# Patient Record
Sex: FEMALE | Race: BLACK OR AFRICAN AMERICAN | NOT HISPANIC OR LATINO | Employment: PART TIME | URBAN - METROPOLITAN AREA
[De-identification: names, ages, dates, MRNs, and addresses within clinical notes are randomized per-mention and may not be internally consistent; named-entity substitution may affect disease eponyms.]

---
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
## 2024-02-29 ENCOUNTER — OFFICE VISIT (OUTPATIENT)
Dept: OBSTETRICS AND GYNECOLOGY | Facility: CLINIC | Age: 19
End: 2024-02-29

## 2024-02-29 VITALS
DIASTOLIC BLOOD PRESSURE: 79 MMHG | SYSTOLIC BLOOD PRESSURE: 120 MMHG | WEIGHT: 137 LBS | BODY MASS INDEX: 22.82 KG/M2 | HEIGHT: 65 IN

## 2024-02-29 DIAGNOSIS — Z30.09 BIRTH CONTROL COUNSELING: ICD-10-CM

## 2024-02-29 DIAGNOSIS — R10.30 LOWER ABDOMINAL PAIN: ICD-10-CM

## 2024-02-29 DIAGNOSIS — R10.2 PELVIC PAIN: Primary | ICD-10-CM

## 2024-02-29 RX ORDER — NORELGESTROMIN AND ETHINYL ESTRADIOL 35; 150 UG/MG; UG/MG
1 PATCH TRANSDERMAL WEEKLY
Qty: 12 PATCH | Refills: 3 | Status: SHIPPED | OUTPATIENT
Start: 2024-02-29 | End: 2024-05-29

## 2024-02-29 NOTE — PROGRESS NOTES
"Chief Complaint   Patient presents with    Gynecologic Exam     Patient states she wants to change birth control, states she is having abdominal pain        SUBJECTIVE:     Dariel Schilling is a 18 y.o.  female who presents to follow up pelvic pain and discuss birth control change. She was last seen on 23 for management of pelvic pain and lower abdominal pain. At her last visit, she was started on Orilissa samples and she was taking for 2 months and did note some improvement in her pelvic pain. She is no longer taking the medication because she ran out of samples and she is currently changing insurances. She describes the pain in her pelvis and lower abdomen most frequently occurring on her left side but will sometimes occur on her right side. She denies aggravating factors. She states it usually comes out of no where and a lot of the time wakes her up in the morning. She states it is mostly pressure sensation and burning sensation. She would like to consider resuming birth control patches as she did not experience the pain while taking it, just struggled with it adhering to her skin in the summer. She states that with use of the Nuva ring and pills, she continued to have the pain. She has been using condoms currently while she has been sexually active. Her last period was on 24.     Past Medical History:   Diagnosis Date    Asthma     Chlamydia       History reviewed. No pertinent surgical history.   Social History     Tobacco Use    Smoking status: Never   Vaping Use    Vaping Use: Never used   Substance Use Topics    Alcohol use: Never    Drug use: Not Currently     Types: Marijuana     OB History   No obstetric history on file.        Review of Systems   Gastrointestinal:  Positive for abdominal pain.   Genitourinary:  Positive for pelvic pain.       OBJECTIVE:   Vitals:    24 0956   BP: 120/79   Weight: 62.1 kg (137 lb)   Height: 165.1 cm (65\")        Physical Exam  Vitals reviewed. "   Constitutional:       General: She is not in acute distress.  HENT:      Head: Normocephalic and atraumatic.      Right Ear: External ear normal.      Left Ear: External ear normal.   Eyes:      Extraocular Movements: Extraocular movements intact.      Pupils: Pupils are equal, round, and reactive to light.   Pulmonary:      Effort: Pulmonary effort is normal. No respiratory distress.   Abdominal:      General: There is no distension.      Palpations: Abdomen is soft.      Tenderness: There is no abdominal tenderness. There is no guarding or rebound.   Musculoskeletal:         General: No deformity. Normal range of motion.      Cervical back: Normal range of motion and neck supple.   Skin:     General: Skin is warm and dry.   Neurological:      General: No focal deficit present.      Mental Status: She is alert and oriented to person, place, and time.   Psychiatric:         Mood and Affect: Mood normal.         Behavior: Behavior normal.         ASSESSMENT:     ICD-10-CM ICD-9-CM   1. Pelvic pain  R10.2 [id number]   2. Lower abdominal pain  R10.30 789.09   3. Birth control counseling  Z30.09 V25.09       PLAN:   We discussed resumption of birth control patch for both management of contraception and pelvic/abdominal pain. The etiology of her pelvic and lower abdominal pain is still difficulty to pinpoint and may be due to suspected endometriosis. Both the patient and I agree that surgical evaluation with diagnostic laparoscopy would be more of a last resort if her pain were to worsen despite medications or she struggled with fertility. In the meantime, we would like to continue more conservative management and have prescribed Ortho-Evra patches. She understands that she needs to use back up birth control with first 7 days of use of patch and should start with her next period. She is to call if she has worsening pain or concerning side effects with medication. All questions answered.   Lastly, prescribed Silver  Sulfadiazine 1% cream for burns on the back of the patient's legs.   Return in about 1 year (around 2/28/2025) for Annual physical.    Angela Rush MD

## 2024-02-29 NOTE — PROGRESS NOTES
2 months of taking the medication.     Frequent pain left side. Deeper pain comes out of no where.   Always wake up in pain. Usually every day. Burning sensation. Pressure      The Orilissa helped the most.     Patch - no problems on that- weight gain.   Nuva ring and pills -caused the pain.     Patch- cramps weren't outrageous     Currently sexually active.    LMP- 2/18/24    Condoms

## 2024-04-27 ENCOUNTER — APPOINTMENT (OUTPATIENT)
Dept: GENERAL RADIOLOGY | Facility: HOSPITAL | Age: 19
End: 2024-04-27
Payer: MEDICAID

## 2024-04-27 ENCOUNTER — APPOINTMENT (OUTPATIENT)
Dept: CT IMAGING | Facility: HOSPITAL | Age: 19
End: 2024-04-27
Payer: MEDICAID

## 2024-04-27 ENCOUNTER — HOSPITAL ENCOUNTER (EMERGENCY)
Facility: HOSPITAL | Age: 19
Discharge: HOME | End: 2024-04-27
Attending: EMERGENCY MEDICINE
Payer: MEDICAID

## 2024-04-27 ENCOUNTER — HOSPITAL ENCOUNTER (EMERGENCY)
Facility: HOSPITAL | Age: 19
Discharge: HOME | End: 2024-04-28
Attending: EMERGENCY MEDICINE | Admitting: EMERGENCY MEDICINE
Payer: MEDICAID

## 2024-04-27 VITALS
RESPIRATION RATE: 20 BRPM | OXYGEN SATURATION: 100 % | SYSTOLIC BLOOD PRESSURE: 119 MMHG | TEMPERATURE: 97 F | DIASTOLIC BLOOD PRESSURE: 75 MMHG | HEART RATE: 85 BPM

## 2024-04-27 DIAGNOSIS — B34.8 PARAINFLUENZA: Primary | ICD-10-CM

## 2024-04-27 DIAGNOSIS — R11.2 NAUSEA AND VOMITING, UNSPECIFIED VOMITING TYPE: ICD-10-CM

## 2024-04-27 DIAGNOSIS — B34.8 PARAINFLUENZA INFECTION: Primary | ICD-10-CM

## 2024-04-27 DIAGNOSIS — R10.30 LOWER ABDOMINAL PAIN: ICD-10-CM

## 2024-04-27 LAB
ALBUMIN SERPL-MCNC: 4.2 G/DL (ref 3.5–5.2)
ALBUMIN/GLOB SERPL: 1.4 G/DL
ALP SERPL-CCNC: 65 U/L (ref 39–117)
ALT SERPL W P-5'-P-CCNC: 5 U/L (ref 1–33)
ANION GAP SERPL CALCULATED.3IONS-SCNC: 12.4 MMOL/L (ref 5–15)
AST SERPL-CCNC: 10 U/L (ref 1–32)
B PARAPERT DNA SPEC QL NAA+PROBE: NOT DETECTED
B PERT DNA SPEC QL NAA+PROBE: NOT DETECTED
BACTERIA UR QL AUTO: ABNORMAL /HPF
BASOPHILS # BLD AUTO: 0.03 10*3/MM3 (ref 0–0.2)
BASOPHILS NFR BLD AUTO: 0.4 % (ref 0–1.5)
BILIRUB SERPL-MCNC: 0.4 MG/DL (ref 0–1.2)
BILIRUB UR QL STRIP: NEGATIVE
BUN SERPL-MCNC: 4 MG/DL (ref 6–20)
BUN/CREAT SERPL: 5.5 (ref 7–25)
C PNEUM DNA NPH QL NAA+NON-PROBE: NOT DETECTED
CALCIUM SPEC-SCNC: 9.5 MG/DL (ref 8.6–10.5)
CHLORIDE SERPL-SCNC: 104 MMOL/L (ref 98–107)
CLARITY UR: ABNORMAL
CO2 SERPL-SCNC: 21.6 MMOL/L (ref 22–29)
COLOR UR: YELLOW
CREAT SERPL-MCNC: 0.73 MG/DL (ref 0.57–1)
D DIMER PPP FEU-MCNC: 0.29 MCGFEU/ML (ref 0–0.5)
DEPRECATED RDW RBC AUTO: 40.4 FL (ref 37–54)
EGFRCR SERPLBLD CKD-EPI 2021: 121.7 ML/MIN/1.73
EOSINOPHIL # BLD AUTO: 0.27 10*3/MM3 (ref 0–0.4)
EOSINOPHIL NFR BLD AUTO: 3.2 % (ref 0.3–6.2)
ERYTHROCYTE [DISTWIDTH] IN BLOOD BY AUTOMATED COUNT: 13.4 % (ref 12.3–15.4)
FLUAV SUBTYP SPEC NAA+PROBE: NOT DETECTED
FLUBV RNA ISLT QL NAA+PROBE: NOT DETECTED
GLOBULIN UR ELPH-MCNC: 2.9 GM/DL
GLUCOSE SERPL-MCNC: 90 MG/DL (ref 65–99)
GLUCOSE UR STRIP-MCNC: NEGATIVE MG/DL
HADV DNA SPEC NAA+PROBE: NOT DETECTED
HCG SERPL QL: NEGATIVE
HCOV 229E RNA SPEC QL NAA+PROBE: NOT DETECTED
HCOV HKU1 RNA SPEC QL NAA+PROBE: NOT DETECTED
HCOV NL63 RNA SPEC QL NAA+PROBE: NOT DETECTED
HCOV OC43 RNA SPEC QL NAA+PROBE: NOT DETECTED
HCT VFR BLD AUTO: 40.8 % (ref 34–46.6)
HGB BLD-MCNC: 13.1 G/DL (ref 12–15.9)
HGB UR QL STRIP.AUTO: NEGATIVE
HMPV RNA NPH QL NAA+NON-PROBE: NOT DETECTED
HPIV1 RNA ISLT QL NAA+PROBE: NOT DETECTED
HPIV2 RNA SPEC QL NAA+PROBE: NOT DETECTED
HPIV3 RNA NPH QL NAA+PROBE: DETECTED
HPIV4 P GENE NPH QL NAA+PROBE: NOT DETECTED
HYALINE CASTS UR QL AUTO: ABNORMAL /LPF
KETONES UR QL STRIP: ABNORMAL
LEUKOCYTE ESTERASE UR QL STRIP.AUTO: ABNORMAL
LYMPHOCYTES # BLD AUTO: 1.54 10*3/MM3 (ref 0.7–3.1)
LYMPHOCYTES NFR BLD AUTO: 18.2 % (ref 19.6–45.3)
M PNEUMO IGG SER IA-ACNC: NOT DETECTED
MCH RBC QN AUTO: 26.7 PG (ref 26.6–33)
MCHC RBC AUTO-ENTMCNC: 32.1 G/DL (ref 31.5–35.7)
MCV RBC AUTO: 83.3 FL (ref 79–97)
MONOCYTES # BLD AUTO: 0.62 10*3/MM3 (ref 0.1–0.9)
MONOCYTES NFR BLD AUTO: 7.3 % (ref 5–12)
NEUTROPHILS NFR BLD AUTO: 5.95 10*3/MM3 (ref 1.7–7)
NEUTROPHILS NFR BLD AUTO: 70.5 % (ref 42.7–76)
NITRITE UR QL STRIP: NEGATIVE
PH UR STRIP.AUTO: 8 [PH] (ref 5–8)
PLATELET # BLD AUTO: 359 10*3/MM3 (ref 140–450)
PMV BLD AUTO: 10.1 FL (ref 6–12)
POTASSIUM SERPL-SCNC: 3.4 MMOL/L (ref 3.5–5.2)
PROT SERPL-MCNC: 7.1 G/DL (ref 6–8.5)
PROT UR QL STRIP: NEGATIVE
QT INTERVAL: 340 MS
QTC INTERVAL: 414 MS
RBC # BLD AUTO: 4.9 10*6/MM3 (ref 3.77–5.28)
RBC # UR STRIP: ABNORMAL /HPF
REF LAB TEST METHOD: ABNORMAL
RHINOVIRUS RNA SPEC NAA+PROBE: NOT DETECTED
RSV RNA NPH QL NAA+NON-PROBE: NOT DETECTED
SARS-COV-2 RNA NPH QL NAA+NON-PROBE: NOT DETECTED
SODIUM SERPL-SCNC: 138 MMOL/L (ref 136–145)
SP GR UR STRIP: 1.01 (ref 1–1.03)
SQUAMOUS #/AREA URNS HPF: ABNORMAL /HPF
UROBILINOGEN UR QL STRIP: ABNORMAL
WBC # UR STRIP: ABNORMAL /HPF
WBC NRBC COR # BLD AUTO: 8.44 10*3/MM3 (ref 3.4–10.8)

## 2024-04-27 PROCEDURE — 80053 COMPREHEN METABOLIC PANEL: CPT | Performed by: EMERGENCY MEDICINE

## 2024-04-27 PROCEDURE — 83690 ASSAY OF LIPASE: CPT | Performed by: PHYSICIAN ASSISTANT

## 2024-04-27 PROCEDURE — 85025 COMPLETE CBC W/AUTO DIFF WBC: CPT | Performed by: PHYSICIAN ASSISTANT

## 2024-04-27 PROCEDURE — 25010000002 ONDANSETRON PER 1 MG: Performed by: PHYSICIAN ASSISTANT

## 2024-04-27 PROCEDURE — 96374 THER/PROPH/DIAG INJ IV PUSH: CPT

## 2024-04-27 PROCEDURE — 36415 COLL VENOUS BLD VENIPUNCTURE: CPT

## 2024-04-27 PROCEDURE — 93005 ELECTROCARDIOGRAM TRACING: CPT | Performed by: PHYSICIAN ASSISTANT

## 2024-04-27 PROCEDURE — 25810000003 SODIUM CHLORIDE 0.9 % SOLUTION: Performed by: PHYSICIAN ASSISTANT

## 2024-04-27 PROCEDURE — 96361 HYDRATE IV INFUSION ADD-ON: CPT

## 2024-04-27 PROCEDURE — 93010 ELECTROCARDIOGRAM REPORT: CPT | Performed by: INTERNAL MEDICINE

## 2024-04-27 PROCEDURE — 85379 FIBRIN DEGRADATION QUANT: CPT | Performed by: PHYSICIAN ASSISTANT

## 2024-04-27 PROCEDURE — 25010000002 KETOROLAC TROMETHAMINE PER 15 MG: Performed by: PHYSICIAN ASSISTANT

## 2024-04-27 PROCEDURE — 99285 EMERGENCY DEPT VISIT HI MDM: CPT

## 2024-04-27 PROCEDURE — 96375 TX/PRO/DX INJ NEW DRUG ADDON: CPT

## 2024-04-27 PROCEDURE — 71045 X-RAY EXAM CHEST 1 VIEW: CPT

## 2024-04-27 PROCEDURE — 25010000002 DROPERIDOL PER 5 MG: Performed by: PHYSICIAN ASSISTANT

## 2024-04-27 PROCEDURE — 81001 URINALYSIS AUTO W/SCOPE: CPT | Performed by: PHYSICIAN ASSISTANT

## 2024-04-27 PROCEDURE — 99284 EMERGENCY DEPT VISIT MOD MDM: CPT

## 2024-04-27 PROCEDURE — 84703 CHORIONIC GONADOTROPIN ASSAY: CPT | Performed by: PHYSICIAN ASSISTANT

## 2024-04-27 PROCEDURE — 80053 COMPREHEN METABOLIC PANEL: CPT | Performed by: PHYSICIAN ASSISTANT

## 2024-04-27 PROCEDURE — 25810000003 LACTATED RINGERS SOLUTION: Performed by: EMERGENCY MEDICINE

## 2024-04-27 PROCEDURE — 85025 COMPLETE CBC W/AUTO DIFF WBC: CPT | Performed by: EMERGENCY MEDICINE

## 2024-04-27 PROCEDURE — 0202U NFCT DS 22 TRGT SARS-COV-2: CPT | Performed by: PHYSICIAN ASSISTANT

## 2024-04-27 RX ORDER — KETOROLAC TROMETHAMINE 30 MG/ML
30 INJECTION, SOLUTION INTRAMUSCULAR; INTRAVENOUS ONCE
Status: COMPLETED | OUTPATIENT
Start: 2024-04-27 | End: 2024-04-27

## 2024-04-27 RX ORDER — ONDANSETRON 4 MG/1
4 TABLET, ORALLY DISINTEGRATING ORAL EVERY 8 HOURS PRN
Qty: 12 TABLET | Refills: 0 | Status: SHIPPED | OUTPATIENT
Start: 2024-04-27

## 2024-04-27 RX ORDER — ONDANSETRON 2 MG/ML
4 INJECTION INTRAMUSCULAR; INTRAVENOUS ONCE
Status: COMPLETED | OUTPATIENT
Start: 2024-04-27 | End: 2024-04-27

## 2024-04-27 RX ORDER — DROPERIDOL 2.5 MG/ML
1.25 INJECTION, SOLUTION INTRAMUSCULAR; INTRAVENOUS ONCE
Status: COMPLETED | OUTPATIENT
Start: 2024-04-27 | End: 2024-04-27

## 2024-04-27 RX ORDER — ONDANSETRON 2 MG/ML
4 INJECTION INTRAMUSCULAR; INTRAVENOUS ONCE
Status: DISCONTINUED | OUTPATIENT
Start: 2024-04-27 | End: 2024-04-27

## 2024-04-27 RX ADMIN — ONDANSETRON 4 MG: 2 INJECTION INTRAMUSCULAR; INTRAVENOUS at 18:44

## 2024-04-27 RX ADMIN — DROPERIDOL 1.25 MG: 2.5 INJECTION, SOLUTION INTRAMUSCULAR; INTRAVENOUS at 23:50

## 2024-04-27 RX ADMIN — SODIUM CHLORIDE 1000 ML: 9 INJECTION, SOLUTION INTRAVENOUS at 18:44

## 2024-04-27 RX ADMIN — KETOROLAC TROMETHAMINE 30 MG: 30 INJECTION, SOLUTION INTRAMUSCULAR at 20:22

## 2024-04-27 RX ADMIN — SODIUM CHLORIDE, POTASSIUM CHLORIDE, SODIUM LACTATE AND CALCIUM CHLORIDE 1000 ML: 600; 310; 30; 20 INJECTION, SOLUTION INTRAVENOUS at 23:49

## 2024-04-27 NOTE — ED PROVIDER NOTES
EMERGENCY DEPARTMENT ENCOUNTER      PCP: Provider, No Known  Patient Care Team:  Provider, No Known as PCP - General   Independent Historians: Patient    HPI:  Chief Complaint: Headache, nausea, vomiting, cough, fever  A complete HPI/ROS/PMH/PSH/SH/FH are unobtainable due to: None    Chronic or social conditions impacting patient care (social determinants of health): None    Context: Dariel Schilling is a 18 y.o. female who presents to the ED c/o acute headache, shortness of breath, body aches, cough.  Symptoms have been going on for the past week but worsened over the past few days.  She reports fever this morning at home.  No leg swelling.  No recent surgeries or recent travel.  Not taking oral contraceptives.  No history of DVT or PE.  Denies other significant medical history.    Review of prior external notes and/or external test results outside of this encounter: ***      PAST MEDICAL HISTORY  Active Ambulatory Problems     Diagnosis Date Noted    Left-sided weakness 10/04/2023     Resolved Ambulatory Problems     Diagnosis Date Noted    No Resolved Ambulatory Problems     Past Medical History:   Diagnosis Date    Asthma     Chlamydia        The patient has started, but not completed, their COVID-19 vaccination series.    PAST SURGICAL HISTORY  No past surgical history on file.      FAMILY HISTORY  Family History   Problem Relation Age of Onset    Breast cancer Maternal Great-Grandmother          SOCIAL HISTORY  Social History     Socioeconomic History    Marital status: Single   Tobacco Use    Smoking status: Never   Vaping Use    Vaping status: Never Used   Substance and Sexual Activity    Alcohol use: Never    Drug use: Not Currently     Types: Marijuana    Sexual activity: Yes     Birth control/protection: None         ALLERGIES  Pomegranate [punica]        REVIEW OF SYSTEMS  Review of Systems   ***  All systems reviewed and negative except for those discussed in HPI.       PHYSICAL EXAM    I have  reviewed the triage vital signs and nursing notes.    ED Triage Vitals   Temp Heart Rate Resp BP SpO2   04/27/24 1743 04/27/24 1743 04/27/24 1743 04/27/24 1822 04/27/24 1743   97 °F (36.1 °C) 114 20 106/71 99 %      Temp src Heart Rate Source Patient Position BP Location FiO2 (%)   -- -- -- -- --              Physical Exam  GENERAL: ***alert, no acute distress  SKIN: Warm, dry  HENT: Normocephalic, atraumatic  EYES: no scleral icterus  CV: regular rhythm, regular rate  RESPIRATORY: normal effort, lungs clear  ABDOMEN: soft, nontender, nondistended  MUSCULOSKELETAL: no deformity  NEURO: alert, moves all extremities, follows commands          LAB RESULTS  Recent Results (from the past 24 hour(s))   Comprehensive Metabolic Panel    Collection Time: 04/27/24  6:22 PM    Specimen: Blood   Result Value Ref Range    Glucose 90 65 - 99 mg/dL    BUN 4 (L) 6 - 20 mg/dL    Creatinine 0.73 0.57 - 1.00 mg/dL    Sodium 138 136 - 145 mmol/L    Potassium 3.4 (L) 3.5 - 5.2 mmol/L    Chloride 104 98 - 107 mmol/L    CO2 21.6 (L) 22.0 - 29.0 mmol/L    Calcium 9.5 8.6 - 10.5 mg/dL    Total Protein 7.1 6.0 - 8.5 g/dL    Albumin 4.2 3.5 - 5.2 g/dL    ALT (SGPT) 5 1 - 33 U/L    AST (SGOT) 10 1 - 32 U/L    Alkaline Phosphatase 65 39 - 117 U/L    Total Bilirubin 0.4 0.0 - 1.2 mg/dL    Globulin 2.9 gm/dL    A/G Ratio 1.4 g/dL    BUN/Creatinine Ratio 5.5 (L) 7.0 - 25.0    Anion Gap 12.4 5.0 - 15.0 mmol/L    eGFR 121.7 >60.0 mL/min/1.73   hCG, Serum, Qualitative    Collection Time: 04/27/24  6:22 PM    Specimen: Blood   Result Value Ref Range    HCG Qualitative Negative Negative   D-dimer, Quantitative    Collection Time: 04/27/24  6:22 PM    Specimen: Blood   Result Value Ref Range    D-Dimer, Quantitative 0.29 0.00 - 0.50 MCGFEU/mL   CBC Auto Differential    Collection Time: 04/27/24  6:22 PM    Specimen: Blood   Result Value Ref Range    WBC 8.44 3.40 - 10.80 10*3/mm3    RBC 4.90 3.77 - 5.28 10*6/mm3    Hemoglobin 13.1 12.0 - 15.9 g/dL     Hematocrit 40.8 34.0 - 46.6 %    MCV 83.3 79.0 - 97.0 fL    MCH 26.7 26.6 - 33.0 pg    MCHC 32.1 31.5 - 35.7 g/dL    RDW 13.4 12.3 - 15.4 %    RDW-SD 40.4 37.0 - 54.0 fl    MPV 10.1 6.0 - 12.0 fL    Platelets 359 140 - 450 10*3/mm3    Neutrophil % 70.5 42.7 - 76.0 %    Lymphocyte % 18.2 (L) 19.6 - 45.3 %    Monocyte % 7.3 5.0 - 12.0 %    Eosinophil % 3.2 0.3 - 6.2 %    Basophil % 0.4 0.0 - 1.5 %    Neutrophils, Absolute 5.95 1.70 - 7.00 10*3/mm3    Lymphocytes, Absolute 1.54 0.70 - 3.10 10*3/mm3    Monocytes, Absolute 0.62 0.10 - 0.90 10*3/mm3    Eosinophils, Absolute 0.27 0.00 - 0.40 10*3/mm3    Basophils, Absolute 0.03 0.00 - 0.20 10*3/mm3   Respiratory Panel PCR w/COVID-19(SARS-CoV-2) [facility] In-House, NP Swab in [facility], 2 HR TAT - Swab, Nasopharynx    Collection Time: 04/27/24  6:23 PM    Specimen: Nasopharynx; Swab   Result Value Ref Range    ADENOVIRUS, PCR Not Detected Not Detected    Coronavirus 229E Not Detected Not Detected    Coronavirus HKU1 Not Detected Not Detected    Coronavirus NL63 Not Detected Not Detected    Coronavirus OC43 Not Detected Not Detected    COVID19 Not Detected Not Detected - Ref. Range    Human Metapneumovirus Not Detected Not Detected    Human Rhinovirus/Enterovirus Not Detected Not Detected    Influenza A PCR Not Detected Not Detected    Influenza B PCR Not Detected Not Detected    Parainfluenza Virus 1 Not Detected Not Detected    Parainfluenza Virus 2 Not Detected Not Detected    Parainfluenza Virus 3 Detected (A) Not Detected    Parainfluenza Virus 4 Not Detected Not Detected    RSV, PCR Not Detected Not Detected    Bordetella pertussis pcr Not Detected Not Detected    Bordetella parapertussis PCR Not Detected Not Detected    Chlamydophila pneumoniae PCR Not Detected Not Detected    Mycoplasma pneumo by PCR Not Detected Not Detected   ECG 12 Lead Dyspnea    Collection Time: 04/27/24  6:31 PM   Result Value Ref Range    QT Interval 340 ms    QTC Interval 414 ms    Urinalysis With Microscopic If Indicated (No Culture) - Urine, Clean Catch    Collection Time: 04/27/24  7:20 PM    Specimen: Urine, Clean Catch   Result Value Ref Range    Color, UA Yellow Yellow, Straw    Appearance, UA Cloudy (A) Clear    pH, UA 8.0 5.0 - 8.0    Specific Gravity, UA 1.013 1.005 - 1.030    Glucose, UA Negative Negative    Ketones, UA 40 mg/dL (2+) (A) Negative    Bilirubin, UA Negative Negative    Blood, UA Negative Negative    Protein, UA Negative Negative    Leuk Esterase, UA Trace (A) Negative    Nitrite, UA Negative Negative    Urobilinogen, UA 1.0 E.U./dL 0.2 - 1.0 E.U./dL   Urinalysis, Microscopic Only - Urine, Clean Catch    Collection Time: 04/27/24  7:20 PM    Specimen: Urine, Clean Catch   Result Value Ref Range    RBC, UA 0-2 None Seen, 0-2 /HPF    WBC, UA 11-20 (A) None Seen, 0-2 /HPF    Bacteria, UA Trace (A) None Seen /HPF    Squamous Epithelial Cells, UA 7-12 (A) None Seen, 0-2 /HPF    Hyaline Casts, UA None Seen None Seen /LPF    Methodology Automated Microscopy        Ordered the above labs and independently reviewed and interpreted the results.        RADIOLOGY  XR Chest 1 View    Result Date: 4/27/2024  XR CHEST 1 VW-  Clinical: Shortness of breath and chest pain, fever  COMPARISON examination 12/1/2023  FINDINGS: The cardiomediastinal silhouette is normal and the lungs are clear.  CONCLUSION: Normal chest  This report was finalized on 4/27/2024 6:33 PM by Dr. Yonas Raphael M.D on Workstation: [id number]       I ordered the above noted radiological studies. Independently reviewed and interpreted by me.  See dictation for official radiology interpretation.      PROCEDURES    Procedures      MEDICATIONS GIVEN IN ER    Medications   sodium chloride 0.9 % bolus 1,000 mL (1,000 mL Intravenous New Bag 4/27/24 1844)   ondansetron (ZOFRAN) injection 4 mg (4 mg Intravenous Given 4/27/24 1844)         PROGRESS, DATA ANALYSIS, CONSULTS, AND MEDICAL DECISION MAKING    All labs have been  independently reviewed and interpreted by me.  All radiology studies have been independently reviewed and interpreted by me and discussed with radiologist dictating the report.   EKG's independently reviewed and interpreted by me.  Discussion below represents my analysis of pertinent findings related to patient's condition, differential diagnosis, treatment plan and final disposition.    Differential diagnosis***    ED Course as of 04/27/24 1957   Sat Apr 27, 2024 1904 WBC: 8.44 [DC]   1904 Hemoglobin: 13.1 [DC]   1904 Creatinine: 0.73 [DC]   1904 Potassium(!): 3.4 [DC]   1904 CO2(!): 21.6 [DC]   1904 D-Dimer, Quant: 0.29 [DC]   1904 HCG Qualitative: Negative [DC]   1904 XR Chest 1 View  Radiology study independently interpreted by me and my findings are no pneumothorax.   [DC]   1949 Parainfluenza Virus 3(!): Detected [DC]      ED Course User Index  [DC] Marcia Ho PA             AS OF 19:57 EDT VITALS:    BP - 119/75  HR - 86  TEMP - 97 °F (36.1 °C)  O2 SATS - 100%        DIAGNOSIS  Final diagnoses:   None         DISPOSITION  ED Disposition       None               Note Disclaimer: At [facility], we believe that sharing information builds trust and better relationships. You are receiving this note because you recently visited [facility]. It is possible you will see health information before a provider has talked with you about it. This kind of information can be easy to misunderstand. To help you fully understand what it means for your health, we urge you to discuss this note with your provider.

## 2024-04-28 ENCOUNTER — APPOINTMENT (OUTPATIENT)
Dept: CT IMAGING | Facility: HOSPITAL | Age: 19
End: 2024-04-28
Payer: MEDICAID

## 2024-04-28 VITALS
SYSTOLIC BLOOD PRESSURE: 116 MMHG | RESPIRATION RATE: 16 BRPM | OXYGEN SATURATION: 99 % | DIASTOLIC BLOOD PRESSURE: 84 MMHG | HEART RATE: 86 BPM | HEIGHT: 65 IN | BODY MASS INDEX: 23.32 KG/M2 | WEIGHT: 140 LBS | TEMPERATURE: 97 F

## 2024-04-28 LAB
ALBUMIN SERPL-MCNC: 4.2 G/DL (ref 3.5–5.2)
ALBUMIN/GLOB SERPL: 1.5 G/DL
ALP SERPL-CCNC: 58 U/L (ref 39–117)
ALT SERPL W P-5'-P-CCNC: 10 U/L (ref 1–33)
ANION GAP SERPL CALCULATED.3IONS-SCNC: 13 MMOL/L (ref 5–15)
AST SERPL-CCNC: 9 U/L (ref 1–32)
BASOPHILS # BLD AUTO: 0.03 10*3/MM3 (ref 0–0.2)
BASOPHILS NFR BLD AUTO: 0.4 % (ref 0–1.5)
BILIRUB SERPL-MCNC: 0.3 MG/DL (ref 0–1.2)
BUN SERPL-MCNC: 4 MG/DL (ref 6–20)
BUN/CREAT SERPL: 6.2 (ref 7–25)
CALCIUM SPEC-SCNC: 9 MG/DL (ref 8.6–10.5)
CHLORIDE SERPL-SCNC: 105 MMOL/L (ref 98–107)
CO2 SERPL-SCNC: 20 MMOL/L (ref 22–29)
CREAT SERPL-MCNC: 0.65 MG/DL (ref 0.57–1)
DEPRECATED RDW RBC AUTO: 42.1 FL (ref 37–54)
EGFRCR SERPLBLD CKD-EPI 2021: 130.3 ML/MIN/1.73
EOSINOPHIL # BLD AUTO: 0.01 10*3/MM3 (ref 0–0.4)
EOSINOPHIL NFR BLD AUTO: 0.1 % (ref 0.3–6.2)
ERYTHROCYTE [DISTWIDTH] IN BLOOD BY AUTOMATED COUNT: 13.6 % (ref 12.3–15.4)
GLOBULIN UR ELPH-MCNC: 2.8 GM/DL
GLUCOSE SERPL-MCNC: 110 MG/DL (ref 65–99)
HCT VFR BLD AUTO: 39 % (ref 34–46.6)
HGB BLD-MCNC: 12.5 G/DL (ref 12–15.9)
IMM GRANULOCYTES # BLD AUTO: 0.01 10*3/MM3 (ref 0–0.05)
IMM GRANULOCYTES NFR BLD AUTO: 0.1 % (ref 0–0.5)
LIPASE SERPL-CCNC: 12 U/L (ref 13–60)
LYMPHOCYTES # BLD AUTO: 0.5 10*3/MM3 (ref 0.7–3.1)
LYMPHOCYTES NFR BLD AUTO: 6.5 % (ref 19.6–45.3)
MCH RBC QN AUTO: 27 PG (ref 26.6–33)
MCHC RBC AUTO-ENTMCNC: 32.1 G/DL (ref 31.5–35.7)
MCV RBC AUTO: 84.2 FL (ref 79–97)
MONOCYTES # BLD AUTO: 0.28 10*3/MM3 (ref 0.1–0.9)
MONOCYTES NFR BLD AUTO: 3.6 % (ref 5–12)
NEUTROPHILS NFR BLD AUTO: 6.92 10*3/MM3 (ref 1.7–7)
NEUTROPHILS NFR BLD AUTO: 89.3 % (ref 42.7–76)
NRBC BLD AUTO-RTO: 0 /100 WBC (ref 0–0.2)
PLATELET # BLD AUTO: 310 10*3/MM3 (ref 140–450)
PMV BLD AUTO: 10 FL (ref 6–12)
POTASSIUM SERPL-SCNC: 3.3 MMOL/L (ref 3.5–5.2)
PROT SERPL-MCNC: 7 G/DL (ref 6–8.5)
RBC # BLD AUTO: 4.63 10*6/MM3 (ref 3.77–5.28)
SODIUM SERPL-SCNC: 138 MMOL/L (ref 136–145)
WBC NRBC COR # BLD AUTO: 7.75 10*3/MM3 (ref 3.4–10.8)

## 2024-04-28 PROCEDURE — 25510000001 IOPAMIDOL 61 % SOLUTION: Performed by: EMERGENCY MEDICINE

## 2024-04-28 PROCEDURE — 96376 TX/PRO/DX INJ SAME DRUG ADON: CPT

## 2024-04-28 PROCEDURE — 25010000002 ONDANSETRON PER 1 MG: Performed by: EMERGENCY MEDICINE

## 2024-04-28 PROCEDURE — 25010000002 ONDANSETRON PER 1 MG: Performed by: PHYSICIAN ASSISTANT

## 2024-04-28 PROCEDURE — 74177 CT ABD & PELVIS W/CONTRAST: CPT

## 2024-04-28 PROCEDURE — 96375 TX/PRO/DX INJ NEW DRUG ADDON: CPT

## 2024-04-28 RX ORDER — ONDANSETRON 2 MG/ML
4 INJECTION INTRAMUSCULAR; INTRAVENOUS ONCE
Status: COMPLETED | OUTPATIENT
Start: 2024-04-28 | End: 2024-04-28

## 2024-04-28 RX ORDER — ONDANSETRON 4 MG/1
4 TABLET, ORALLY DISINTEGRATING ORAL EVERY 8 HOURS PRN
Qty: 20 TABLET | Refills: 0 | Status: SHIPPED | OUTPATIENT
Start: 2024-04-28

## 2024-04-28 RX ADMIN — ONDANSETRON 4 MG: 2 INJECTION INTRAMUSCULAR; INTRAVENOUS at 00:46

## 2024-04-28 RX ADMIN — IOPAMIDOL 85 ML: 612 INJECTION, SOLUTION INTRAVENOUS at 00:19

## 2024-04-28 RX ADMIN — ONDANSETRON 4 MG: 2 INJECTION INTRAMUSCULAR; INTRAVENOUS at 02:03

## 2024-04-28 NOTE — DISCHARGE INSTRUCTIONS
Please follow-up with your PCP.    Rest.  Increase fluid intake.      Although you are being discharged in the ED today, I encouraged her to return for worsening symptoms.  Things can, and do, change such a treatment at home with medication may not be adequate.  Specifically I recommend returning for chest pain or discomfort, difficulty breathing, persistent vomiting or difficulty holding down liquids or medications, fever > 102.0 F,  or any other worsening or alarming symptoms.     Take meds as prescribed.     You have been evaluated in the emergency department for your presenting symptoms and deemed appropriate for discharge home.  Understand that your health care does not end here today.  It is important that your primary care physician be made aware of your visit.  I recommend calling your primary care provider in the next 48 hours to arrange follow-up care.  Your primary care provider needs to review your treatment and recovery to ensure that no further treatment is necessary.  Additional testing or procedures may be necessary as an outpatient at the discretion of your primary care provider.    I also recommend following up with your PCP for recheck of your blood pressure and treatment as needed.

## 2024-04-28 NOTE — ED PROVIDER NOTES
EMERGENCY DEPARTMENT ENCOUNTER  Room Number:  03/03  PCP: Provider, No Known  Independent Historians: Patient      HPI:  Chief Complaint: had concerns including Vomiting and Chills.     A complete HPI/ROS/PMH/PSH/SH/FH are unobtainable due to: None    Chronic or social conditions impacting patient care (Social Determinants of Health): None      Context: Dariel Schilling is a 18 y.o. female who presents emergency department with generalized abdominal pain, chills, nausea and vomiting that began after leaving the emergency department earlier today.  Patient was seen here earlier for similar complaints and was found to have parainfluenza infection.  She had nausea when she was here but was not vomiting or having significant abdominal pain.  She was having more chest pain and cough.  She was evaluated and ultimately discharged home but as she got home she started vomiting every 10 minutes and developed lower abdominal pain which is what prompted him to return to the ED.  Patient has chills but denies vomiting.  She denies vaginal bleeding or discharge.  She denies urinary symptoms.  She has a history of intra-abdominal surgeries.      Review of prior external notes (non-ED) -and- Review of prior external test results outside of this encounter:   Reviewed labs from patient's earlier ED visit, patient's D-dimer and hCG were both negative white blood cell count 8.4, positive for parainfluenza on respiratory panel    Patient was seen in the OB/GYN office on 2/29/2024 for pelvic and lower abdominal pain which she reported mostly occurs on the left side but sometimes on the right.  She was started on birth control for management of pelvic and abdominal pain as well as contraception.  It is suspected she may have endometriosis.    PAST MEDICAL HISTORY  Active Ambulatory Problems     Diagnosis Date Noted    Left-sided weakness 10/04/2023     Resolved Ambulatory Problems     Diagnosis Date Noted    No Resolved Ambulatory  Problems     Past Medical History:   Diagnosis Date    Asthma     Chlamydia          PAST SURGICAL HISTORY  No past surgical history on file.      FAMILY HISTORY  Family History   Problem Relation Age of Onset    Breast cancer Maternal Great-Grandmother          SOCIAL HISTORY  Social History     Socioeconomic History    Marital status: Single   Tobacco Use    Smoking status: Never   Vaping Use    Vaping status: Never Used   Substance and Sexual Activity    Alcohol use: Never    Drug use: Not Currently     Types: Marijuana    Sexual activity: Yes     Birth control/protection: None         ALLERGIES  Pomegranate [punica]      REVIEW OF SYSTEMS  Included in HPI  All systems reviewed and negative except for those discussed in HPI.      PHYSICAL EXAM    I have reviewed the triage vital signs and nursing notes.    ED Triage Vitals   Temp Heart Rate Resp BP SpO2   04/27/24 2325 04/27/24 2325 04/27/24 2325 04/27/24 2327 04/27/24 2326   97 °F (36.1 °C) 98 20 106/68 100 %      Temp src Heart Rate Source Patient Position BP Location FiO2 (%)   -- -- -- -- --              Physical Exam  Constitutional:       Appearance: She is ill-appearing. She is not toxic-appearing.   HENT:      Head: Normocephalic and atraumatic.      Nose: Nose normal.      Mouth/Throat:      Mouth: Mucous membranes are moist.   Eyes:      Extraocular Movements: Extraocular movements intact.      Pupils: Pupils are equal, round, and reactive to light.   Cardiovascular:      Rate and Rhythm: Normal rate and regular rhythm.      Pulses: Normal pulses.      Heart sounds: Normal heart sounds.   Pulmonary:      Effort: Pulmonary effort is normal. No respiratory distress.      Breath sounds: Normal breath sounds.   Abdominal:      General: Abdomen is flat. There is no distension.      Palpations: Abdomen is soft.      Tenderness: There is abdominal tenderness (diffuse tenderness across lower abdominal). There is no guarding or rebound.   Musculoskeletal:          General: Normal range of motion.      Cervical back: Normal range of motion and neck supple.   Skin:     General: Skin is warm and dry.      Capillary Refill: Capillary refill takes less than 2 seconds.   Neurological:      General: No focal deficit present.      Mental Status: She is alert and oriented to person, place, and time.   Psychiatric:         Mood and Affect: Mood normal.         Behavior: Behavior normal.           LAB RESULTS  Recent Results (from the past 24 hour(s))   Comprehensive Metabolic Panel    Collection Time: 04/27/24  6:22 PM    Specimen: Blood   Result Value Ref Range    Glucose 90 65 - 99 mg/dL    BUN 4 (L) 6 - 20 mg/dL    Creatinine 0.73 0.57 - 1.00 mg/dL    Sodium 138 136 - 145 mmol/L    Potassium 3.4 (L) 3.5 - 5.2 mmol/L    Chloride 104 98 - 107 mmol/L    CO2 21.6 (L) 22.0 - 29.0 mmol/L    Calcium 9.5 8.6 - 10.5 mg/dL    Total Protein 7.1 6.0 - 8.5 g/dL    Albumin 4.2 3.5 - 5.2 g/dL    ALT (SGPT) 5 1 - 33 U/L    AST (SGOT) 10 1 - 32 U/L    Alkaline Phosphatase 65 39 - 117 U/L    Total Bilirubin 0.4 0.0 - 1.2 mg/dL    Globulin 2.9 gm/dL    A/G Ratio 1.4 g/dL    BUN/Creatinine Ratio 5.5 (L) 7.0 - 25.0    Anion Gap 12.4 5.0 - 15.0 mmol/L    eGFR 121.7 >60.0 mL/min/1.73   hCG, Serum, Qualitative    Collection Time: 04/27/24  6:22 PM    Specimen: Blood   Result Value Ref Range    HCG Qualitative Negative Negative   D-dimer, Quantitative    Collection Time: 04/27/24  6:22 PM    Specimen: Blood   Result Value Ref Range    D-Dimer, Quantitative 0.29 0.00 - 0.50 MCGFEU/mL   CBC Auto Differential    Collection Time: 04/27/24  6:22 PM    Specimen: Blood   Result Value Ref Range    WBC 8.44 3.40 - 10.80 10*3/mm3    RBC 4.90 3.77 - 5.28 10*6/mm3    Hemoglobin 13.1 12.0 - 15.9 g/dL    Hematocrit 40.8 34.0 - 46.6 %    MCV 83.3 79.0 - 97.0 fL    MCH 26.7 26.6 - 33.0 pg    MCHC 32.1 31.5 - 35.7 g/dL    RDW 13.4 12.3 - 15.4 %    RDW-SD 40.4 37.0 - 54.0 fl    MPV 10.1 6.0 - 12.0 fL    Platelets 359 140 -  450 10*3/mm3    Neutrophil % 70.5 42.7 - 76.0 %    Lymphocyte % 18.2 (L) 19.6 - 45.3 %    Monocyte % 7.3 5.0 - 12.0 %    Eosinophil % 3.2 0.3 - 6.2 %    Basophil % 0.4 0.0 - 1.5 %    Neutrophils, Absolute 5.95 1.70 - 7.00 10*3/mm3    Lymphocytes, Absolute 1.54 0.70 - 3.10 10*3/mm3    Monocytes, Absolute 0.62 0.10 - 0.90 10*3/mm3    Eosinophils, Absolute 0.27 0.00 - 0.40 10*3/mm3    Basophils, Absolute 0.03 0.00 - 0.20 10*3/mm3   Respiratory Panel PCR w/COVID-19(SARS-CoV-2) [facility] In-House, NP Swab in UTM/VTM, 2 HR TAT - Swab, Nasopharynx    Collection Time: 04/27/24  6:23 PM    Specimen: Nasopharynx; Swab   Result Value Ref Range    ADENOVIRUS, PCR Not Detected Not Detected    Coronavirus 229E Not Detected Not Detected    Coronavirus HKU1 Not Detected Not Detected    Coronavirus NL63 Not Detected Not Detected    Coronavirus OC43 Not Detected Not Detected    COVID19 Not Detected Not Detected - Ref. Range    Human Metapneumovirus Not Detected Not Detected    Human Rhinovirus/Enterovirus Not Detected Not Detected    Influenza A PCR Not Detected Not Detected    Influenza B PCR Not Detected Not Detected    Parainfluenza Virus 1 Not Detected Not Detected    Parainfluenza Virus 2 Not Detected Not Detected    Parainfluenza Virus 3 Detected (A) Not Detected    Parainfluenza Virus 4 Not Detected Not Detected    RSV, PCR Not Detected Not Detected    Bordetella pertussis pcr Not Detected Not Detected    Bordetella parapertussis PCR Not Detected Not Detected    Chlamydophila pneumoniae PCR Not Detected Not Detected    Mycoplasma pneumo by PCR Not Detected Not Detected   ECG 12 Lead Dyspnea    Collection Time: 04/27/24  6:31 PM   Result Value Ref Range    QT Interval 340 ms    QTC Interval 414 ms   Urinalysis With Microscopic If Indicated (No Culture) - Urine, Clean Catch    Collection Time: 04/27/24  7:20 PM    Specimen: Urine, Clean Catch   Result Value Ref Range    Color, UA Yellow Yellow, Straw    Appearance,  UA Cloudy (A) Clear    pH, UA 8.0 5.0 - 8.0    Specific Gravity, UA 1.013 1.005 - 1.030    Glucose, UA Negative Negative    Ketones, UA 40 mg/dL (2+) (A) Negative    Bilirubin, UA Negative Negative    Blood, UA Negative Negative    Protein, UA Negative Negative    Leuk Esterase, UA Trace (A) Negative    Nitrite, UA Negative Negative    Urobilinogen, UA 1.0 E.U./dL 0.2 - 1.0 E.U./dL   Urinalysis, Microscopic Only - Urine, Clean Catch    Collection Time: 04/27/24  7:20 PM    Specimen: Urine, Clean Catch   Result Value Ref Range    RBC, UA 0-2 None Seen, 0-2 /HPF    WBC, UA 11-20 (A) None Seen, 0-2 /HPF    Bacteria, UA Trace (A) None Seen /HPF    Squamous Epithelial Cells, UA 7-12 (A) None Seen, 0-2 /HPF    Hyaline Casts, UA None Seen None Seen /LPF    Methodology Automated Microscopy    Comprehensive Metabolic Panel    Collection Time: 04/27/24 11:48 PM    Specimen: Blood   Result Value Ref Range    Glucose 110 (H) 65 - 99 mg/dL    BUN 4 (L) 6 - 20 mg/dL    Creatinine 0.65 0.57 - 1.00 mg/dL    Sodium 138 136 - 145 mmol/L    Potassium 3.3 (L) 3.5 - 5.2 mmol/L    Chloride 105 98 - 107 mmol/L    CO2 20.0 (L) 22.0 - 29.0 mmol/L    Calcium 9.0 8.6 - 10.5 mg/dL    Total Protein 7.0 6.0 - 8.5 g/dL    Albumin 4.2 3.5 - 5.2 g/dL    ALT (SGPT) 10 1 - 33 U/L    AST (SGOT) 9 1 - 32 U/L    Alkaline Phosphatase 58 39 - 117 U/L    Total Bilirubin 0.3 0.0 - 1.2 mg/dL    Globulin 2.8 gm/dL    A/G Ratio 1.5 g/dL    BUN/Creatinine Ratio 6.2 (L) 7.0 - 25.0    Anion Gap 13.0 5.0 - 15.0 mmol/L    eGFR 130.3 >60.0 mL/min/1.73   CBC Auto Differential    Collection Time: 04/27/24 11:48 PM    Specimen: Blood   Result Value Ref Range    WBC 7.75 3.40 - 10.80 10*3/mm3    RBC 4.63 3.77 - 5.28 10*6/mm3    Hemoglobin 12.5 12.0 - 15.9 g/dL    Hematocrit 39.0 34.0 - 46.6 %    MCV 84.2 79.0 - 97.0 fL    MCH 27.0 26.6 - 33.0 pg    MCHC 32.1 31.5 - 35.7 g/dL    RDW 13.6 12.3 - 15.4 %    RDW-SD 42.1 37.0 - 54.0 fl    MPV 10.0 6.0 - 12.0 fL    Platelets  310 140 - 450 10*3/mm3    Neutrophil % 89.3 (H) 42.7 - 76.0 %    Lymphocyte % 6.5 (L) 19.6 - 45.3 %    Monocyte % 3.6 (L) 5.0 - 12.0 %    Eosinophil % 0.1 (L) 0.3 - 6.2 %    Basophil % 0.4 0.0 - 1.5 %    Immature Grans % 0.1 0.0 - 0.5 %    Neutrophils, Absolute 6.92 1.70 - 7.00 10*3/mm3    Lymphocytes, Absolute 0.50 (L) 0.70 - 3.10 10*3/mm3    Monocytes, Absolute 0.28 0.10 - 0.90 10*3/mm3    Eosinophils, Absolute 0.01 0.00 - 0.40 10*3/mm3    Basophils, Absolute 0.03 0.00 - 0.20 10*3/mm3    Immature Grans, Absolute 0.01 0.00 - 0.05 10*3/mm3    nRBC 0.0 0.0 - 0.2 /100 WBC   Lipase    Collection Time: 04/27/24 11:48 PM    Specimen: Blood   Result Value Ref Range    Lipase 12 (L) 13 - 60 U/L         RADIOLOGY  CT Abdomen Pelvis With Contrast    Result Date: 4/28/2024  CT OF THE ABDOMEN AND PELVIS WITH CONTRAST  HISTORY: Abdominal pain  COMPARISON: August 21, 2023  TECHNIQUE: Axial CT imaging was obtained through the abdomen and pelvis. IV contrast was administered.  FINDINGS: Images through the lung bases are clear. There is a tiny cyst within the right lobe of the liver. Gallbladder is normal. The stomach, duodenum, adrenal glands, spleen, and pancreas all appear normal. The kidneys enhance symmetrically. There is no hydronephrosis. No distal ureteral or bladder stones are seen. Uterus is retroflexed. No suspicious adnexal masses are seen. There is a small amount of free fluid within the pelvis, likely physiologic. No bowel obstruction is seen. There is no evidence of appendicitis. No acute osseous abnormalities are seen.      No acute intra-abdominal or intrapelvic process is seen.  Radiation dose reduction techniques were utilized, including automated exposure control and exposure modulation based on body size.   This report was finalized on 4/28/2024 12:39 AM by Dr. Leidy Yañez M.D on Workstation: [id number]      XR Chest 1 View    Result Date: 4/27/2024  XR CHEST 1 VW-  Clinical: Shortness of breath and  chest pain, fever  COMPARISON examination 12/1/2023  FINDINGS: The cardiomediastinal silhouette is normal and the lungs are clear.  CONCLUSION: Normal chest  This report was finalized on 4/27/2024 6:33 PM by Dr. Yonas Raphael M.D on Workstation: [id number]         MEDICATIONS GIVEN IN ER  Medications   ondansetron (ZOFRAN) injection 4 mg (has no administration in time range)   lactated ringers bolus 1,000 mL (0 mL Intravenous Stopped 4/28/24 0049)   droperidol (INAPSINE) injection 1.25 mg (1.25 mg Intravenous Given 4/27/24 2350)   iopamidol (ISOVUE-300) 61 % injection 85 mL (85 mL Intravenous Given 4/28/24 0019)   ondansetron (ZOFRAN) injection 4 mg (4 mg Intravenous Given 4/28/24 0046)           OUTPATIENT MEDICATION MANAGEMENT:  Current Facility-Administered Medications Ordered in Epic   Medication Dose Route Frequency Provider Last Rate Last Admin    ondansetron (ZOFRAN) injection 4 mg  4 mg Intravenous Once Adrian Sanchez MD         Current Outpatient Medications Ordered in Epic   Medication Sig Dispense Refill    albuterol sulfate  (90 Base) MCG/ACT inhaler 1 to 2 puffs every 4 to 6 hours for cough, shortness of breath, and wheezing      Banophen 25 MG capsule Take 1 capsule by mouth 2 (Two) Times a Day.      cetirizine (zyrTEC) 5 MG tablet Take 1 tablet by mouth Every Morning.      dicyclomine (BENTYL) 10 MG capsule Take 1 capsule by mouth 2 (Two) Times a Day.      fluconazole (DIFLUCAN) 150 MG tablet Take 1 tablet by mouth 1 (One) Time Per Week. 2 tablet 0    methocarbamol (ROBAXIN) 750 MG tablet Take 1 tablet by mouth 4 (Four) Times a Day As Needed for Muscle Spasms. 30 tablet 0    methylPREDNISolone (MEDROL) 4 MG dose pack Take as directed on package instructions. 21 tablet 0    norelgestromin-ethinyl estradiol (ORTHO EVRA) 150-35 MCG/24HR Place 1 patch on the skin as directed by provider 1 (One) Time Per Week for 90 days. 12 patch 3    ondansetron (ZOFRAN) 4 MG tablet Take one every 8 hours prn  nausea and vomiting      ondansetron ODT (ZOFRAN-ODT) 4 MG disintegrating tablet Place 1 tablet on the tongue Every 8 (Eight) Hours As Needed for Nausea or Vomiting. 12 tablet 0    ondansetron ODT (ZOFRAN-ODT) 4 MG disintegrating tablet Place 1 tablet on the tongue Every 8 (Eight) Hours As Needed for Nausea or Vomiting. 20 tablet 0    pantoprazole (PROTONIX) 40 MG EC tablet Take 1 tablet by mouth Every Morning. (Patient not taking: Reported on 2/29/2024)               PROGRESS, DATA ANALYSIS, CONSULTS, AND MEDICAL DECISION MAKING  ORDERS PLACED DURING THIS VISIT:  Orders Placed This Encounter   Procedures    CT Abdomen Pelvis With Contrast    Comprehensive Metabolic Panel    CBC Auto Differential    Lipase    CBC & Differential       All labs have been independently interpreted by me.  All radiology studies have been reviewed by me. All EKG's have been independently viewed and interpreted by me.  Discussion below represents my analysis of pertinent findings related to patient's condition, differential diagnosis, treatment plan and final disposition.    Differential diagnosis includes but is not limited to:   My differential diagnosis for abdominal pain includes but is not limited to:  Gastritis, gastroenteritis, peptic ulcer disease, GERD, esophageal perforation, acute appendicitis, mesenteric adenitis, Meckel’s diverticulum, epiploic appendagitis, diverticulitis, colon cancer, ulcerative colitis, Crohn’s disease, intussusception, small bowel obstruction, adhesions, ischemic bowel, perforated viscus, ileus, obstipation, biliary colic, cholecystitis, cholelithiasis, Spencer-Aaron Alonso, hepatitis, pancreatitis, common bile duct obstruction, cholangitis, bile leak, splenic trauma, splenic rupture, splenic infarction, splenic abscess, abdominal abscess, ascites, spontaneous bacterial peritonitis, hernia, UTI, cystitis, prostatitis, ureterolithiasis, urinary obstruction, ovarian cyst, torsion, pregnancy, ectopic pregnancy,  PID, pelvic abscess, mittelschmerz, endometriosis, AAA, myocardial infarction, pneumonia, cancer, porphyria, DKA, medications, sickle cell, viral syndrome, zoster      ED Course:  ED Course as of 04/28/24 0201   Sat Apr 27, 2024   2341 I discussed the case with Dr. Sanchez and they agree to evaluate the patient at the bedside.    [CC]   Sun Apr 28, 2024   0024 WBC: 7.75 [CC]   0024 Lipase(!): 12 [CC]   0025 CT Abdomen Pelvis With Contrast  My independent interpretation of the CT is no intracranial hemorrhage [CC]   0139 Rechecked on patient: She is resting comfortably bed.  She says her abdominal pain has lessened.  Will p.o. challenge. [CC]   0158 I rechecked the patient.  Patient tolerated p.o.  I discussed the patient's labs, radiology findings (including all incidental findings), diagnosis, and plan for discharge.  A repeat exam reveals no new worrisome changes from my initial exam findings.  The patient understands that the fact that they are being discharged does not denote that nothing is abnormal, it indicates that no clinical emergency is present and that they must follow-up as directed in order to properly maintain their health.  Follow-up instructions (specifically listed below) and return to ER precautions were given at this time.  I specifically instructed the patient to follow-up with their PCP.  The patient understands and agrees with the plan, and is ready for discharge.  All questions answered.   [CC]      ED Course User Index  [CC] Ketty Cheema PA-C           AS OF 02:01 EDT VITALS:    BP - 116/84  HR - 86  TEMP - 97 °F (36.1 °C)  O2 SATS - 99%      MDM:  Patient is a 18-year-old female with parainfluenza virus who presents back to the emergency department for the second time today with nausea and vomiting that started upon being discharged from the hospital earlier today.  On reevaluation vitals are reassuring, she is afebrile.  On my exam she appears to feel unwell but nontoxic.  Her  abdomen is diffusely tender to palpation and she is actively dry heaving.  Patient was evaluated with labs which are overall reassuring.  Lipase is negative.  She was subsequent evaluated with a CT of the abdomen pelvis which shows no intra-abdominal findings.  On reevaluation patient is feeling much better and is tolerating p.o.  Will treat with Zofran on an outpatient basis.  Patient is appropriate for discharge with outpatient follow-up.      COMPLEXITY OF CARE  Admission was considered but after careful review of the patient's presentation, physical examination, diagnostic results, and response to treatment the patient may be safely discharged with outpatient follow-up.        DIAGNOSIS  Final diagnoses:   Parainfluenza   Nausea and vomiting, unspecified vomiting type   Lower abdominal pain         DISPOSITION  ED Disposition       ED Disposition   Discharge    Condition   Stable    Comment   --                      Please note that portions of this document were completed with a voice recognition program.    Note Disclaimer: At [facility], we believe that sharing information builds trust and better relationships. You are receiving this note because you recently visited [facility]. It is possible you will see health information before a provider has talked with you about it. This kind of information can be easy to misunderstand. To help you fully understand what it means for your health, we urge you to discuss this note with your provider.     Ketty Cheema PA-C  04/28/24 0201

## 2024-04-28 NOTE — ED PROVIDER NOTES
MD ATTESTATION NOTE    The VIN[facility] and I have discussed this patient's history, physical exam, and treatment plan.  I have reviewed the documentation and personally had a face to face interaction with the patient. I affirm the documentation and agree with the treatment and plan.  The attached note describes my personal findings.      I provided a substantive portion of the care of the patient.  I personally performed the physical exam in its entirety, and below are my findings.      Brief HPI: This patient is a 18-year-old female presenting to the emergency room with multiple complaints.  She describes chills, generalized bodyaches, cough, shortness of breath, headache, as well as nausea that has been present now for the last several days.  She denies overt abdominal pain, dysuria, or known fever.      PHYSICAL EXAM  ED Triage Vitals   Temp Heart Rate Resp BP SpO2   04/27/24 1743 04/27/24 1743 04/27/24 1743 04/27/24 1822 04/27/24 1743   97 °F (36.1 °C) 114 20 106/71 99 %      Temp src Heart Rate Source Patient Position BP Location FiO2 (%)   -- -- -- -- --                GENERAL: Resting comfortably and in no acute distress, nontoxic in appearance  HENT: nares patent  EYES: no scleral icterus  CV: regular rhythm, normal rate, no M/R/G  RESPIRATORY: normal effort, lungs clear bilaterally  ABDOMEN: soft, nontender, no rebound or guarding  MUSCULOSKELETAL: no deformity, no edema  NEURO: alert, moves all extremities, follows commands  PSYCH:  calm, cooperative  SKIN: warm, dry    Vital signs and nursing notes reviewed.      Differential diagnosis includes but is not limited to urinary tract infection, pneumonia, acute bronchitis, COVID-19, viral upper respiratory infection, or severe electrolyte disturbance.      Plan: We will obtain labs, urinalysis, chest x-ray, as well as RVP with COVID-19 testing in the ED today.  Will monitor and reassess following.      Chest x-ray was independently interpreted by myself with my  interpretation showing no cardiomegaly nor area of edema, infiltrate, or pneumothorax.       Adrian Olvera MD  04/27/24 2056

## 2024-04-28 NOTE — ED PROVIDER NOTES
MD ATTESTATION NOTE    The VINCE and I have discussed this patient's history, physical exam, and treatment plan.  I have reviewed the documentation and personally had a face to face interaction with the patient. I affirm the documentation and agree with the treatment and plan.  The attached note describes my personal findings.        SHARED VISIT: This visit was performed by BOTH a physician and an APC. The substantive portion of the medical decision making was performed by this attesting physician who made or approved the management plan and takes responsibility for patient management. All studies in the APC note (if performed) were independently interpreted by me.      Brief HPI: Patient presents for evaluation of abdominal pain and vomiting.  Patient was here earlier for cough congestion and was positive for parainfluenza.  Now has vomiting.  No diarrhea.  No fevers or chills.  Lower abdominal pain.    PHYSICAL EXAM  ED Triage Vitals   Temp Heart Rate Resp BP SpO2   04/27/24 2325 04/27/24 2325 04/27/24 2325 04/27/24 2327 04/27/24 2326   97 °F (36.1 °C) 98 20 106/68 100 %      Temp src Heart Rate Source Patient Position BP Location FiO2 (%)   -- -- -- -- --                GENERAL: no acute distress  HENT: nares patent  EYES: no scleral icterus  CV: regular rhythm, normal rate  RESPIRATORY: normal effort  ABDOMEN: soft.  Mild diffuse tenderness  MUSCULOSKELETAL: no deformity  NEURO: alert, moves all extremities, follows commands  PSYCH:  calm, cooperative  SKIN: warm, dry    Vital signs and nursing notes reviewed.    CT abdomen independently interpreted by me and shows no obstruction    ED Course as of 04/28/24 0851   Sat Apr 27, 2024   2341 I discussed the case with Dr. Sanchez and they agree to evaluate the patient at the bedside.    [CC]   Sun Apr 28, 2024   0024 WBC: 7.75 [CC]   0024 Lipase(!): 12 [CC]   0025 CT Abdomen Pelvis With Contrast  My independent interpretation of the CT is no intracranial hemorrhage  [CC]   0139 Rechecked on patient: She is resting comfortably bed.  She says her abdominal pain has lessened.  Will p.o. challenge. [CC]   0158 I rechecked the patient.  Patient tolerated p.o.  I discussed the patient's labs, radiology findings (including all incidental findings), diagnosis, and plan for discharge.  A repeat exam reveals no new worrisome changes from my initial exam findings.  The patient understands that the fact that they are being discharged does not denote that nothing is abnormal, it indicates that no clinical emergency is present and that they must follow-up as directed in order to properly maintain their health.  Follow-up instructions (specifically listed below) and return to ER precautions were given at this time.  I specifically instructed the patient to follow-up with their PCP.  The patient understands and agrees with the plan, and is ready for discharge.  All questions answered.   [CC]      ED Course User Index  [CC] Ketty Cheema, PA-C           Plan: discharge       Adrian Sanchez MD  04/28/24 0851

## 2024-04-28 NOTE — ED TRIAGE NOTES
Pt to ED via PV w/ family. Pt and family member states pt was seen here earlier today and was diagnosed with parainfluenza. Pt states she has been vomiting q 20 minutes, has new onset of chills, and now has a cough and abdominal pain.

## 2024-05-08 ENCOUNTER — PATIENT ROUNDING (BHMG ONLY) (OUTPATIENT)
Dept: FAMILY MEDICINE CLINIC | Facility: CLINIC | Age: 19
End: 2024-05-08
Payer: MEDICAID

## 2024-05-08 ENCOUNTER — OFFICE VISIT (OUTPATIENT)
Dept: FAMILY MEDICINE CLINIC | Facility: CLINIC | Age: 19
End: 2024-05-08
Payer: MEDICAID

## 2024-05-08 VITALS
DIASTOLIC BLOOD PRESSURE: 76 MMHG | HEART RATE: 91 BPM | HEIGHT: 65 IN | WEIGHT: 127.8 LBS | BODY MASS INDEX: 21.29 KG/M2 | OXYGEN SATURATION: 97 % | SYSTOLIC BLOOD PRESSURE: 112 MMHG

## 2024-05-08 DIAGNOSIS — G89.29 CHRONIC CHEST PAIN: ICD-10-CM

## 2024-05-08 DIAGNOSIS — R07.9 CHRONIC CHEST PAIN: ICD-10-CM

## 2024-05-08 DIAGNOSIS — Z11.3 SCREENING EXAMINATION FOR STI: ICD-10-CM

## 2024-05-08 DIAGNOSIS — Z11.59 ENCOUNTER FOR HEPATITIS C SCREENING TEST FOR LOW RISK PATIENT: ICD-10-CM

## 2024-05-08 DIAGNOSIS — N80.9 ENDOMETRIOSIS: ICD-10-CM

## 2024-05-08 DIAGNOSIS — E87.6 HYPOKALEMIA: ICD-10-CM

## 2024-05-08 DIAGNOSIS — Z01.84 IMMUNITY STATUS TESTING: ICD-10-CM

## 2024-05-08 DIAGNOSIS — J06.9 UPPER RESPIRATORY TRACT INFECTION, UNSPECIFIED TYPE: ICD-10-CM

## 2024-05-08 DIAGNOSIS — Z13.29 SCREENING FOR THYROID DISORDER: ICD-10-CM

## 2024-05-08 DIAGNOSIS — R10.9 CHRONIC ABDOMINAL PAIN: Primary | ICD-10-CM

## 2024-05-08 DIAGNOSIS — Z11.4 SCREENING FOR HIV (HUMAN IMMUNODEFICIENCY VIRUS): ICD-10-CM

## 2024-05-08 DIAGNOSIS — Z00.00 ROUTINE HEALTH MAINTENANCE: ICD-10-CM

## 2024-05-08 DIAGNOSIS — J45.909 UNCOMPLICATED ASTHMA, UNSPECIFIED ASTHMA SEVERITY, UNSPECIFIED WHETHER PERSISTENT: ICD-10-CM

## 2024-05-08 DIAGNOSIS — R05.1 ACUTE COUGH: ICD-10-CM

## 2024-05-08 DIAGNOSIS — G89.29 CHRONIC ABDOMINAL PAIN: Primary | ICD-10-CM

## 2024-05-08 PROCEDURE — 1160F RVW MEDS BY RX/DR IN RCRD: CPT | Performed by: INTERNAL MEDICINE

## 2024-05-08 PROCEDURE — 99205 OFFICE O/P NEW HI 60 MIN: CPT | Performed by: INTERNAL MEDICINE

## 2024-05-08 PROCEDURE — 1159F MED LIST DOCD IN RCRD: CPT | Performed by: INTERNAL MEDICINE

## 2024-05-08 RX ORDER — METHYLPREDNISOLONE 4 MG/1
TABLET ORAL
Qty: 21 TABLET | Refills: 0 | Status: SHIPPED | OUTPATIENT
Start: 2024-05-08

## 2024-05-08 RX ORDER — SODIUM FLUORIDE 1.1 G/100G
CREAM ORAL
COMMUNITY
Start: 2024-04-25

## 2024-05-08 RX ORDER — BENZONATATE 100 MG/1
100 CAPSULE ORAL 3 TIMES DAILY PRN
Qty: 21 CAPSULE | Refills: 0 | Status: SHIPPED | OUTPATIENT
Start: 2024-05-08

## 2024-05-09 LAB
ALBUMIN SERPL-MCNC: 4.5 G/DL (ref 3.5–5.2)
ALBUMIN/GLOB SERPL: 1.6 G/DL
ALP SERPL-CCNC: 57 U/L (ref 39–117)
ALT SERPL-CCNC: 9 U/L (ref 1–33)
AST SERPL-CCNC: 10 U/L (ref 1–32)
BILIRUB SERPL-MCNC: <0.2 MG/DL (ref 0–1.2)
BUN SERPL-MCNC: 7 MG/DL (ref 6–20)
BUN/CREAT SERPL: 9 (ref 7–25)
CALCIUM SERPL-MCNC: 10.1 MG/DL (ref 8.6–10.5)
CHLORIDE SERPL-SCNC: 103 MMOL/L (ref 98–107)
CO2 SERPL-SCNC: 24.8 MMOL/L (ref 22–29)
CREAT SERPL-MCNC: 0.78 MG/DL (ref 0.57–1)
EGFRCR SERPLBLD CKD-EPI 2021: 112.4 ML/MIN/1.73
GLOBULIN SER CALC-MCNC: 2.8 GM/DL
GLUCOSE SERPL-MCNC: 85 MG/DL (ref 65–99)
HBV SURFACE AB SER QL: REACTIVE
HCV IGG SERPL QL IA: NON REACTIVE
HIV 1+2 AB+HIV1 P24 AG SERPL QL IA: NON REACTIVE
POTASSIUM SERPL-SCNC: 4.5 MMOL/L (ref 3.5–5.2)
PROT SERPL-MCNC: 7.3 G/DL (ref 6–8.5)
RPR SER QL: NON REACTIVE
SODIUM SERPL-SCNC: 141 MMOL/L (ref 136–145)
TSH SERPL DL<=0.005 MIU/L-ACNC: 1.31 UIU/ML (ref 0.27–4.2)

## 2024-05-10 DIAGNOSIS — G89.29 CHRONIC CHEST PAIN: ICD-10-CM

## 2024-05-10 DIAGNOSIS — G89.29 CHRONIC PELVIC PAIN IN FEMALE: ICD-10-CM

## 2024-05-10 DIAGNOSIS — G89.29 CHRONIC ABDOMINAL PAIN: Primary | ICD-10-CM

## 2024-05-10 DIAGNOSIS — R07.9 CHRONIC CHEST PAIN: ICD-10-CM

## 2024-05-10 DIAGNOSIS — R10.9 CHRONIC ABDOMINAL PAIN: Primary | ICD-10-CM

## 2024-05-10 DIAGNOSIS — R11.2 NAUSEA AND VOMITING, UNSPECIFIED VOMITING TYPE: ICD-10-CM

## 2024-05-10 DIAGNOSIS — R10.2 CHRONIC PELVIC PAIN IN FEMALE: ICD-10-CM

## 2024-05-29 ENCOUNTER — TELEMEDICINE (OUTPATIENT)
Dept: FAMILY MEDICINE CLINIC | Facility: CLINIC | Age: 19
End: 2024-05-29
Payer: MEDICAID

## 2024-05-29 DIAGNOSIS — G89.29 CHRONIC ABDOMINAL PAIN: ICD-10-CM

## 2024-05-29 DIAGNOSIS — R05.3 CHRONIC COUGH: Primary | ICD-10-CM

## 2024-05-29 DIAGNOSIS — R11.2 NAUSEA AND VOMITING, UNSPECIFIED VOMITING TYPE: ICD-10-CM

## 2024-05-29 DIAGNOSIS — R10.9 CHRONIC ABDOMINAL PAIN: ICD-10-CM

## 2024-05-29 DIAGNOSIS — R09.81 SINUS CONGESTION: ICD-10-CM

## 2024-05-29 DIAGNOSIS — J30.2 SEASONAL ALLERGIES: ICD-10-CM

## 2024-05-29 DIAGNOSIS — R51.9 FRONTAL HEADACHE: ICD-10-CM

## 2024-05-29 PROBLEM — N80.9 ENDOMETRIOSIS: Status: ACTIVE | Noted: 2024-05-29

## 2024-05-29 PROBLEM — R07.9 CHRONIC CHEST PAIN: Status: ACTIVE | Noted: 2024-05-29

## 2024-05-29 PROCEDURE — 1159F MED LIST DOCD IN RCRD: CPT | Performed by: INTERNAL MEDICINE

## 2024-05-29 PROCEDURE — 1160F RVW MEDS BY RX/DR IN RCRD: CPT | Performed by: INTERNAL MEDICINE

## 2024-05-29 PROCEDURE — 99215 OFFICE O/P EST HI 40 MIN: CPT | Performed by: INTERNAL MEDICINE

## 2024-05-29 RX ORDER — MONTELUKAST SODIUM 10 MG/1
10 TABLET ORAL NIGHTLY
Qty: 30 TABLET | Refills: 3 | Status: SHIPPED | OUTPATIENT
Start: 2024-05-29

## 2024-05-29 RX ORDER — AMOXICILLIN AND CLAVULANATE POTASSIUM 875; 125 MG/1; MG/1
1 TABLET, FILM COATED ORAL EVERY 12 HOURS SCHEDULED
Qty: 14 TABLET | Refills: 0 | Status: SHIPPED | OUTPATIENT
Start: 2024-05-29 | End: 2024-06-05

## 2024-05-29 RX ORDER — AZELASTINE 1 MG/ML
2 SPRAY, METERED NASAL 2 TIMES DAILY
Qty: 30 ML | Refills: 6 | Status: SHIPPED | OUTPATIENT
Start: 2024-05-29
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
## 2024-06-19 ENCOUNTER — HOSPITAL ENCOUNTER (OUTPATIENT)
Dept: RESPIRATORY THERAPY | Facility: HOSPITAL | Age: 19
Discharge: HOME | End: 2024-06-19
Admitting: INTERNAL MEDICINE
Payer: MEDICAID

## 2024-06-19 DIAGNOSIS — R05.3 CHRONIC COUGH: ICD-10-CM

## 2024-06-19 PROCEDURE — 94726 PLETHYSMOGRAPHY LUNG VOLUMES: CPT

## 2024-06-19 PROCEDURE — 94640 AIRWAY INHALATION TREATMENT: CPT

## 2024-06-19 PROCEDURE — 94729 DIFFUSING CAPACITY: CPT

## 2024-06-19 PROCEDURE — 94060 EVALUATION OF WHEEZING: CPT

## 2024-06-19 RX ORDER — ALBUTEROL SULFATE 2.5 MG/3ML
2.5 SOLUTION RESPIRATORY (INHALATION) ONCE
Status: COMPLETED | OUTPATIENT
Start: 2024-06-19 | End: 2024-06-19

## 2024-06-19 RX ADMIN — ALBUTEROL SULFATE 2.5 MG: 2.5 SOLUTION RESPIRATORY (INHALATION) at 15:16

## 2024-06-21 VITALS
RESPIRATION RATE: 16 BRPM | HEART RATE: 91 BPM | BODY MASS INDEX: 21.66 KG/M2 | HEIGHT: 65 IN | OXYGEN SATURATION: 100 % | DIASTOLIC BLOOD PRESSURE: 66 MMHG | SYSTOLIC BLOOD PRESSURE: 100 MMHG | WEIGHT: 130 LBS | TEMPERATURE: 97.4 F

## 2024-06-21 PROCEDURE — 0202U NFCT DS 22 TRGT SARS-COV-2: CPT | Performed by: STUDENT IN AN ORGANIZED HEALTH CARE EDUCATION/TRAINING PROGRAM

## 2024-06-21 PROCEDURE — 99283 EMERGENCY DEPT VISIT LOW MDM: CPT

## 2024-06-22 ENCOUNTER — HOSPITAL ENCOUNTER (EMERGENCY)
Facility: HOSPITAL | Age: 19
Discharge: HOME | End: 2024-06-22
Attending: STUDENT IN AN ORGANIZED HEALTH CARE EDUCATION/TRAINING PROGRAM
Payer: MEDICAID

## 2024-06-22 DIAGNOSIS — R11.2 NAUSEA AND VOMITING, UNSPECIFIED VOMITING TYPE: Primary | ICD-10-CM

## 2024-06-22 LAB
ALBUMIN SERPL-MCNC: 4.2 G/DL (ref 3.5–5.2)
ALBUMIN/GLOB SERPL: 1.3 G/DL
ALP SERPL-CCNC: 57 U/L (ref 39–117)
ALT SERPL W P-5'-P-CCNC: 7 U/L (ref 1–33)
ANION GAP SERPL CALCULATED.3IONS-SCNC: 14 MMOL/L (ref 5–15)
AST SERPL-CCNC: 12 U/L (ref 1–32)
B PARAPERT DNA SPEC QL NAA+PROBE: NOT DETECTED
B PERT DNA SPEC QL NAA+PROBE: NOT DETECTED
BASOPHILS # BLD AUTO: 0.03 10*3/MM3 (ref 0–0.2)
BASOPHILS NFR BLD AUTO: 0.3 % (ref 0–1.5)
BILIRUB SERPL-MCNC: 0.2 MG/DL (ref 0–1.2)
BUN SERPL-MCNC: 5 MG/DL (ref 6–20)
BUN/CREAT SERPL: 6.2 (ref 7–25)
C PNEUM DNA NPH QL NAA+NON-PROBE: NOT DETECTED
CALCIUM SPEC-SCNC: 9.3 MG/DL (ref 8.6–10.5)
CHLORIDE SERPL-SCNC: 104 MMOL/L (ref 98–107)
CO2 SERPL-SCNC: 20 MMOL/L (ref 22–29)
CREAT SERPL-MCNC: 0.81 MG/DL (ref 0.57–1)
DEPRECATED RDW RBC AUTO: 40.3 FL (ref 37–54)
EGFRCR SERPLBLD CKD-EPI 2021: 106.7 ML/MIN/1.73
EOSINOPHIL # BLD AUTO: 0.25 10*3/MM3 (ref 0–0.4)
EOSINOPHIL NFR BLD AUTO: 2.6 % (ref 0.3–6.2)
ERYTHROCYTE [DISTWIDTH] IN BLOOD BY AUTOMATED COUNT: 13.4 % (ref 12.3–15.4)
FLUAV SUBTYP SPEC NAA+PROBE: NOT DETECTED
FLUBV RNA ISLT QL NAA+PROBE: NOT DETECTED
GLOBULIN UR ELPH-MCNC: 3.2 GM/DL
GLUCOSE SERPL-MCNC: 112 MG/DL (ref 65–99)
HADV DNA SPEC NAA+PROBE: NOT DETECTED
HCG SERPL QL: NEGATIVE
HCOV 229E RNA SPEC QL NAA+PROBE: NOT DETECTED
HCOV HKU1 RNA SPEC QL NAA+PROBE: NOT DETECTED
HCOV NL63 RNA SPEC QL NAA+PROBE: NOT DETECTED
HCOV OC43 RNA SPEC QL NAA+PROBE: NOT DETECTED
HCT VFR BLD AUTO: 39.7 % (ref 34–46.6)
HGB BLD-MCNC: 12.8 G/DL (ref 12–15.9)
HMPV RNA NPH QL NAA+NON-PROBE: NOT DETECTED
HOLD SPECIMEN: NORMAL
HPIV1 RNA ISLT QL NAA+PROBE: NOT DETECTED
HPIV2 RNA SPEC QL NAA+PROBE: NOT DETECTED
HPIV3 RNA NPH QL NAA+PROBE: NOT DETECTED
HPIV4 P GENE NPH QL NAA+PROBE: NOT DETECTED
IMM GRANULOCYTES # BLD AUTO: 0.02 10*3/MM3 (ref 0–0.05)
IMM GRANULOCYTES NFR BLD AUTO: 0.2 % (ref 0–0.5)
LIPASE SERPL-CCNC: 17 U/L (ref 13–60)
LYMPHOCYTES # BLD AUTO: 2.22 10*3/MM3 (ref 0.7–3.1)
LYMPHOCYTES NFR BLD AUTO: 23.5 % (ref 19.6–45.3)
M PNEUMO IGG SER IA-ACNC: NOT DETECTED
MCH RBC QN AUTO: 26.8 PG (ref 26.6–33)
MCHC RBC AUTO-ENTMCNC: 32.2 G/DL (ref 31.5–35.7)
MCV RBC AUTO: 83.2 FL (ref 79–97)
MONOCYTES # BLD AUTO: 0.66 10*3/MM3 (ref 0.1–0.9)
MONOCYTES NFR BLD AUTO: 7 % (ref 5–12)
NEUTROPHILS NFR BLD AUTO: 6.27 10*3/MM3 (ref 1.7–7)
NEUTROPHILS NFR BLD AUTO: 66.4 % (ref 42.7–76)
NRBC BLD AUTO-RTO: 0 /100 WBC (ref 0–0.2)
PLATELET # BLD AUTO: 312 10*3/MM3 (ref 140–450)
PMV BLD AUTO: 10.2 FL (ref 6–12)
POTASSIUM SERPL-SCNC: 3.3 MMOL/L (ref 3.5–5.2)
PROT SERPL-MCNC: 7.4 G/DL (ref 6–8.5)
RBC # BLD AUTO: 4.77 10*6/MM3 (ref 3.77–5.28)
RHINOVIRUS RNA SPEC NAA+PROBE: NOT DETECTED
RSV RNA NPH QL NAA+NON-PROBE: NOT DETECTED
SARS-COV-2 RNA NPH QL NAA+NON-PROBE: NOT DETECTED
SODIUM SERPL-SCNC: 138 MMOL/L (ref 136–145)
WBC NRBC COR # BLD AUTO: 9.45 10*3/MM3 (ref 3.4–10.8)
WHOLE BLOOD HOLD COAG: NORMAL
WHOLE BLOOD HOLD SPECIMEN: NORMAL

## 2024-06-22 PROCEDURE — 36415 COLL VENOUS BLD VENIPUNCTURE: CPT

## 2024-06-22 PROCEDURE — 96366 THER/PROPH/DIAG IV INF ADDON: CPT

## 2024-06-22 PROCEDURE — 96375 TX/PRO/DX INJ NEW DRUG ADDON: CPT

## 2024-06-22 PROCEDURE — 25810000003 LACTATED RINGERS SOLUTION: Performed by: STUDENT IN AN ORGANIZED HEALTH CARE EDUCATION/TRAINING PROGRAM

## 2024-06-22 PROCEDURE — 85025 COMPLETE CBC W/AUTO DIFF WBC: CPT

## 2024-06-22 PROCEDURE — 96365 THER/PROPH/DIAG IV INF INIT: CPT

## 2024-06-22 PROCEDURE — 83690 ASSAY OF LIPASE: CPT

## 2024-06-22 PROCEDURE — 25010000002 HALOPERIDOL LACTATE PER 5 MG: Performed by: STUDENT IN AN ORGANIZED HEALTH CARE EDUCATION/TRAINING PROGRAM

## 2024-06-22 PROCEDURE — 84703 CHORIONIC GONADOTROPIN ASSAY: CPT

## 2024-06-22 PROCEDURE — 80053 COMPREHEN METABOLIC PANEL: CPT

## 2024-06-22 PROCEDURE — 25010000002 MAGNESIUM SULFATE 2 GM/50ML SOLUTION: Performed by: STUDENT IN AN ORGANIZED HEALTH CARE EDUCATION/TRAINING PROGRAM

## 2024-06-22 RX ORDER — SODIUM CHLORIDE 0.9 % (FLUSH) 0.9 %
10 SYRINGE (ML) INJECTION AS NEEDED
Status: DISCONTINUED | OUTPATIENT
Start: 2024-06-22 | End: 2024-06-22 | Stop reason: HOSPADM

## 2024-06-22 RX ORDER — SCOLOPAMINE TRANSDERMAL SYSTEM 1 MG/1
1 PATCH, EXTENDED RELEASE TRANSDERMAL
Qty: 30 EACH | Refills: 0 | Status: SHIPPED | OUTPATIENT
Start: 2024-06-22

## 2024-06-22 RX ORDER — MAGNESIUM SULFATE HEPTAHYDRATE 40 MG/ML
2 INJECTION, SOLUTION INTRAVENOUS ONCE
Status: COMPLETED | OUTPATIENT
Start: 2024-06-22 | End: 2024-06-22

## 2024-06-22 RX ORDER — HALOPERIDOL 5 MG/ML
5 INJECTION INTRAMUSCULAR ONCE
Status: COMPLETED | OUTPATIENT
Start: 2024-06-22 | End: 2024-06-22

## 2024-06-22 RX ORDER — PROMETHAZINE HYDROCHLORIDE 25 MG/1
25 SUPPOSITORY RECTAL EVERY 6 HOURS PRN
Qty: 30 SUPPOSITORY | Refills: 0 | Status: SHIPPED | OUTPATIENT
Start: 2024-06-22

## 2024-06-22 RX ADMIN — MAGNESIUM SULFATE HEPTAHYDRATE 2 G: 2 INJECTION, SOLUTION INTRAVENOUS at 04:51

## 2024-06-22 RX ADMIN — HALOPERIDOL LACTATE 5 MG: 5 INJECTION, SOLUTION INTRAMUSCULAR at 02:03

## 2024-06-22 RX ADMIN — SODIUM CHLORIDE, POTASSIUM CHLORIDE, SODIUM LACTATE AND CALCIUM CHLORIDE 1000 ML: 600; 310; 30; 20 INJECTION, SOLUTION INTRAVENOUS at 02:08

## 2024-06-22 NOTE — ED NOTES
"Pt up to triage multiple times asking for medication to \"feel better\", pt states she feels hot and requested an emesis bag, pt actively vomitting. This RN explained that there will be rooms opening soon and that I cannot medicate her without a MD order. Pt directed back out to waiting room.  "

## 2024-06-22 NOTE — ED PROVIDER NOTES
EMERGENCY DEPARTMENT ENCOUNTER    Room Number:  12/12  PCP: Beba Gaytan MD  History obtained from: Patient, mother      HPI:  Chief Complaint: N/v  A complete HPI/ROS/PMH/PSH/SH/FH are unobtainable due to: N/a  Context: Dariel Schilling is a 19 y.o. female who presents to the ED c/o N/v. Ongoing for the last several days, unable to tolerate PO. No fever/chills, cough, chest pain, SOA.             PAST MEDICAL HISTORY  Active Ambulatory Problems     Diagnosis Date Noted    Left-sided weakness 10/04/2023    Routine health maintenance 05/08/2024    Chronic abdominal pain 05/29/2024    Chronic cough 05/29/2024    Seasonal allergies 05/29/2024    Nausea and vomiting 05/29/2024    Chronic chest pain 05/29/2024    Endometriosis 05/29/2024     Resolved Ambulatory Problems     Diagnosis Date Noted    No Resolved Ambulatory Problems     Past Medical History:   Diagnosis Date    Allergic     Anemia     Asthma     Chlamydia          PAST SURGICAL HISTORY  No past surgical history on file.      FAMILY HISTORY  Family History   Problem Relation Age of Onset    Breast cancer Maternal Great-Grandmother          SOCIAL HISTORY  Social History     Socioeconomic History    Marital status: Single   Tobacco Use    Smoking status: Never    Smokeless tobacco: Never   Vaping Use    Vaping status: Never Used   Substance and Sexual Activity    Alcohol use: Never    Drug use: Never     Types: Marijuana    Sexual activity: Yes     Partners: Female     Birth control/protection: Condom, Patch         ALLERGIES  Pomegranate [punica]        REVIEW OF SYSTEMS    As per HPI      PHYSICAL EXAM  ED Triage Vitals   Temp Heart Rate Resp BP SpO2   06/21/24 2323 06/21/24 2323 06/21/24 2323 06/21/24 2327 06/21/24 2323   97.4 °F (36.3 °C) 91 16 100/66 100 %      Temp src Heart Rate Source Patient Position BP Location FiO2 (%)   06/21/24 2323 06/21/24 2323 -- 06/21/24 2327 --   Tympanic Monitor  Left arm        Physical Exam  Constitutional:        General: She is not in acute distress.     Comments: Actively vomiting   HENT:      Head: Normocephalic and atraumatic.   Cardiovascular:      Rate and Rhythm: Normal rate and regular rhythm.   Pulmonary:      Effort: Pulmonary effort is normal. No respiratory distress.   Abdominal:      General: There is no distension.      Palpations: Abdomen is soft.      Tenderness: There is abdominal tenderness.      Comments: Mild epigastric TTP w/o rebound or guarding   Musculoskeletal:         General: No swelling or deformity.   Skin:     General: Skin is warm and dry.   Neurological:      Mental Status: She is alert. Mental status is at baseline.           Vital signs and nursing notes reviewed.          LAB RESULTS  Recent Results (from the past 24 hour(s))   Respiratory Panel PCR w/COVID-19(SARS-CoV-2) [facility] In-House, NP Swab in UTM/VTM, 2 HR TAT - Swab, Nasopharynx    Collection Time: 06/21/24 11:40 PM    Specimen: Nasopharynx; Swab   Result Value Ref Range    ADENOVIRUS, PCR Not Detected Not Detected    Coronavirus 229E Not Detected Not Detected    Coronavirus HKU1 Not Detected Not Detected    Coronavirus NL63 Not Detected Not Detected    Coronavirus OC43 Not Detected Not Detected    COVID19 Not Detected Not Detected - Ref. Range    Human Metapneumovirus Not Detected Not Detected    Human Rhinovirus/Enterovirus Not Detected Not Detected    Influenza A PCR Not Detected Not Detected    Influenza B PCR Not Detected Not Detected    Parainfluenza Virus 1 Not Detected Not Detected    Parainfluenza Virus 2 Not Detected Not Detected    Parainfluenza Virus 3 Not Detected Not Detected    Parainfluenza Virus 4 Not Detected Not Detected    RSV, PCR Not Detected Not Detected    Bordetella pertussis pcr Not Detected Not Detected    Bordetella parapertussis PCR Not Detected Not Detected    Chlamydophila pneumoniae PCR Not Detected Not Detected    Mycoplasma pneumo by PCR Not Detected Not Detected   Comprehensive  Metabolic Panel    Collection Time: 06/22/24 12:43 AM    Specimen: Blood   Result Value Ref Range    Glucose 112 (H) 65 - 99 mg/dL    BUN 5 (L) 6 - 20 mg/dL    Creatinine 0.81 0.57 - 1.00 mg/dL    Sodium 138 136 - 145 mmol/L    Potassium 3.3 (L) 3.5 - 5.2 mmol/L    Chloride 104 98 - 107 mmol/L    CO2 20.0 (L) 22.0 - 29.0 mmol/L    Calcium 9.3 8.6 - 10.5 mg/dL    Total Protein 7.4 6.0 - 8.5 g/dL    Albumin 4.2 3.5 - 5.2 g/dL    ALT (SGPT) 7 1 - 33 U/L    AST (SGOT) 12 1 - 32 U/L    Alkaline Phosphatase 57 39 - 117 U/L    Total Bilirubin 0.2 0.0 - 1.2 mg/dL    Globulin 3.2 gm/dL    A/G Ratio 1.3 g/dL    BUN/Creatinine Ratio 6.2 (L) 7.0 - 25.0    Anion Gap 14.0 5.0 - 15.0 mmol/L    eGFR 106.7 >60.0 mL/min/1.73   Lipase    Collection Time: 06/22/24 12:43 AM    Specimen: Blood   Result Value Ref Range    Lipase 17 13 - 60 U/L   hCG, Serum, Qualitative    Collection Time: 06/22/24 12:43 AM    Specimen: Blood   Result Value Ref Range    HCG Qualitative Negative Negative   Green Top (Gel)    Collection Time: 06/22/24 12:43 AM   Result Value Ref Range    Extra Tube Hold for add-ons.    Lavender Top    Collection Time: 06/22/24 12:43 AM   Result Value Ref Range    Extra Tube hold for add-on    Light Blue Top    Collection Time: 06/22/24 12:43 AM   Result Value Ref Range    Extra Tube Hold for add-ons.    CBC Auto Differential    Collection Time: 06/22/24 12:43 AM    Specimen: Blood   Result Value Ref Range    WBC 9.45 3.40 - 10.80 10*3/mm3    RBC 4.77 3.77 - 5.28 10*6/mm3    Hemoglobin 12.8 12.0 - 15.9 g/dL    Hematocrit 39.7 34.0 - 46.6 %    MCV 83.2 79.0 - 97.0 fL    MCH 26.8 26.6 - 33.0 pg    MCHC 32.2 31.5 - 35.7 g/dL    RDW 13.4 12.3 - 15.4 %    RDW-SD 40.3 37.0 - 54.0 fl    MPV 10.2 6.0 - 12.0 fL    Platelets 312 140 - 450 10*3/mm3    Neutrophil % 66.4 42.7 - 76.0 %    Lymphocyte % 23.5 19.6 - 45.3 %    Monocyte % 7.0 5.0 - 12.0 %    Eosinophil % 2.6 0.3 - 6.2 %    Basophil % 0.3 0.0 - 1.5 %    Immature Grans % 0.2 0.0  - 0.5 %    Neutrophils, Absolute 6.27 1.70 - 7.00 10*3/mm3    Lymphocytes, Absolute 2.22 0.70 - 3.10 10*3/mm3    Monocytes, Absolute 0.66 0.10 - 0.90 10*3/mm3    Eosinophils, Absolute 0.25 0.00 - 0.40 10*3/mm3    Basophils, Absolute 0.03 0.00 - 0.20 10*3/mm3    Immature Grans, Absolute 0.02 0.00 - 0.05 10*3/mm3    nRBC 0.0 0.0 - 0.2 /100 WBC       Ordered the above labs and reviewed the results.        RADIOLOGY  No Radiology Exams Resulted Within Past 24 Hours    Ordered the above noted radiological studies. Reviewed by me in PACS.            MEDICATIONS GIVEN IN ER  Medications   sodium chloride 0.9 % flush 10 mL (has no administration in time range)   haloperidol lactate (HALDOL) injection 5 mg (5 mg Intravenous Given 6/22/24 0203)   lactated ringers bolus 1,000 mL (0 mL Intravenous Stopped 6/22/24 0416)   magnesium sulfate 2g/50 mL (PREMIX) infusion (0 g Intravenous Stopped 6/22/24 0623)               MEDICAL DECISION MAKING, PROGRESS, and CONSULTS    MDM: Patient presenting w/ n/v, recurrent. Otherwise well appearing, VSS. Labs reassuring. No indication for imaging at this visit. Improved after Haldol, tolerating PO. Will dc with close OP follow-up and supportive care.     All labs have been independently reviewed by me.  All radiology studies have been reviewed by me and I have also reviewed the radiology report.   EKG's independently viewed and interpreted by me.  Discussion below represents my analysis of pertinent findings related to patient's condition, differential diagnosis, treatment plan and final disposition.      Additional sources:  - Discussed/ obtained information from independent historians:  Mother    - External (non-ED) record review:     - Chronic or social conditions impacting care: N/v    - Shared decision making:  discussed plan for dc with OP follow-up, patient and family agree      Orders placed during this visit:  Orders Placed This Encounter   Procedures    Respiratory Panel PCR  w/COVID-19(SARS-CoV-2) [facility] In-House, NP Swab in UTM/VTM, 2 HR TAT - Swab, Nasopharynx    [place] Draw    Comprehensive Metabolic Panel    Lipase    Urinalysis With Microscopic If Indicated (No Culture) - Urine, Clean Catch    hCG, Serum, Qualitative    CBC Auto Differential    NPO Diet NPO Type: Strict NPO    Undress & Gown    Insert Peripheral IV    CBC & Differential    Green Top (Gel)    Lavender Top    Light Blue Top         Additional orders considered but not ordered:  Considered CT abdomen pelvis however patient had no TTP on my exam.         Differential diagnosis includes but is not limited to:    Sepsis, gastris, gastroenteritis,       Independent interpretation of labs, radiology studies, and discussions with consultants:           DIAGNOSIS  Final diagnoses:   Nausea and vomiting, unspecified vomiting type         DISPOSITION  Discharged home.         Latest Documented Vital Signs:  As of 06:39 EDT  BP- 100/66 HR- 91 Temp- 97.4 °F (36.3 °C) (Tympanic) O2 sat- 100%              --    Please note that portions of this were completed with a voice recognition program.       Note Disclaimer: At [facility], we believe that sharing information builds trust and better relationships. You are receiving this note because you are receiving care at [facility] or recently visited. It is possible you will see health information before a provider has talked with you about it. This kind of information can be easy to misunderstand. To help you fully understand what it means for your health, we urge you to discuss this note with your provider.             Tyrese Del Real MD  06/22/24 5981

## 2024-06-23 ENCOUNTER — APPOINTMENT (OUTPATIENT)
Dept: CT IMAGING | Facility: HOSPITAL | Age: 19
End: 2024-06-23

## 2024-06-23 ENCOUNTER — HOSPITAL ENCOUNTER (EMERGENCY)
Facility: HOSPITAL | Age: 19
Discharge: HOME | End: 2024-06-23
Attending: EMERGENCY MEDICINE | Admitting: EMERGENCY MEDICINE

## 2024-06-23 VITALS
HEIGHT: 65 IN | SYSTOLIC BLOOD PRESSURE: 122 MMHG | WEIGHT: 130 LBS | HEART RATE: 71 BPM | DIASTOLIC BLOOD PRESSURE: 74 MMHG | OXYGEN SATURATION: 96 % | RESPIRATION RATE: 17 BRPM | TEMPERATURE: 98 F | BODY MASS INDEX: 21.66 KG/M2

## 2024-06-23 DIAGNOSIS — R59.0 CERVICAL LYMPHADENOPATHY: Primary | ICD-10-CM

## 2024-06-23 DIAGNOSIS — J02.9 SORE THROAT: ICD-10-CM

## 2024-06-23 DIAGNOSIS — R11.2 NAUSEA AND VOMITING, UNSPECIFIED VOMITING TYPE: ICD-10-CM

## 2024-06-23 LAB
ALBUMIN SERPL-MCNC: 4.4 G/DL (ref 3.5–5.2)
ALBUMIN/GLOB SERPL: 1.4 G/DL
ALP SERPL-CCNC: 62 U/L (ref 39–117)
ALT SERPL W P-5'-P-CCNC: 7 U/L (ref 1–33)
ANION GAP SERPL CALCULATED.3IONS-SCNC: 13 MMOL/L (ref 5–15)
AST SERPL-CCNC: 9 U/L (ref 1–32)
BASOPHILS # BLD AUTO: 0.03 10*3/MM3 (ref 0–0.2)
BASOPHILS NFR BLD AUTO: 0.2 % (ref 0–1.5)
BILIRUB SERPL-MCNC: 0.2 MG/DL (ref 0–1.2)
BUN SERPL-MCNC: 8 MG/DL (ref 6–20)
BUN/CREAT SERPL: 8.8 (ref 7–25)
CALCIUM SPEC-SCNC: 9.1 MG/DL (ref 8.6–10.5)
CHLORIDE SERPL-SCNC: 102 MMOL/L (ref 98–107)
CO2 SERPL-SCNC: 24 MMOL/L (ref 22–29)
CREAT SERPL-MCNC: 0.91 MG/DL (ref 0.57–1)
DEPRECATED RDW RBC AUTO: 40.4 FL (ref 37–54)
EGFRCR SERPLBLD CKD-EPI 2021: 92.8 ML/MIN/1.73
EOSINOPHIL # BLD AUTO: 0.15 10*3/MM3 (ref 0–0.4)
EOSINOPHIL NFR BLD AUTO: 1.2 % (ref 0.3–6.2)
ERYTHROCYTE [DISTWIDTH] IN BLOOD BY AUTOMATED COUNT: 13.1 % (ref 12.3–15.4)
GLOBULIN UR ELPH-MCNC: 3.2 GM/DL
GLUCOSE SERPL-MCNC: 85 MG/DL (ref 65–99)
HCT VFR BLD AUTO: 41.4 % (ref 34–46.6)
HGB BLD-MCNC: 13.2 G/DL (ref 12–15.9)
IMM GRANULOCYTES # BLD AUTO: 0.03 10*3/MM3 (ref 0–0.05)
IMM GRANULOCYTES NFR BLD AUTO: 0.2 % (ref 0–0.5)
LYMPHOCYTES # BLD AUTO: 1.44 10*3/MM3 (ref 0.7–3.1)
LYMPHOCYTES NFR BLD AUTO: 11.9 % (ref 19.6–45.3)
MCH RBC QN AUTO: 26.8 PG (ref 26.6–33)
MCHC RBC AUTO-ENTMCNC: 31.9 G/DL (ref 31.5–35.7)
MCV RBC AUTO: 84 FL (ref 79–97)
MONOCYTES # BLD AUTO: 0.5 10*3/MM3 (ref 0.1–0.9)
MONOCYTES NFR BLD AUTO: 4.1 % (ref 5–12)
NEUTROPHILS NFR BLD AUTO: 82.4 % (ref 42.7–76)
NEUTROPHILS NFR BLD AUTO: 9.99 10*3/MM3 (ref 1.7–7)
NRBC BLD AUTO-RTO: 0 /100 WBC (ref 0–0.2)
PLATELET # BLD AUTO: 332 10*3/MM3 (ref 140–450)
PMV BLD AUTO: 9.8 FL (ref 6–12)
POTASSIUM SERPL-SCNC: 4.1 MMOL/L (ref 3.5–5.2)
PROT SERPL-MCNC: 7.6 G/DL (ref 6–8.5)
RBC # BLD AUTO: 4.93 10*6/MM3 (ref 3.77–5.28)
S PYO AG THROAT QL: NEGATIVE
SODIUM SERPL-SCNC: 139 MMOL/L (ref 136–145)
WBC NRBC COR # BLD AUTO: 12.14 10*3/MM3 (ref 3.4–10.8)

## 2024-06-23 PROCEDURE — 80053 COMPREHEN METABOLIC PANEL: CPT | Performed by: EMERGENCY MEDICINE

## 2024-06-23 PROCEDURE — 87081 CULTURE SCREEN ONLY: CPT | Performed by: EMERGENCY MEDICINE

## 2024-06-23 PROCEDURE — 96374 THER/PROPH/DIAG INJ IV PUSH: CPT

## 2024-06-23 PROCEDURE — 25010000002 ONDANSETRON PER 1 MG: Performed by: EMERGENCY MEDICINE

## 2024-06-23 PROCEDURE — 25810000003 LACTATED RINGERS SOLUTION: Performed by: EMERGENCY MEDICINE

## 2024-06-23 PROCEDURE — 85025 COMPLETE CBC W/AUTO DIFF WBC: CPT | Performed by: EMERGENCY MEDICINE

## 2024-06-23 PROCEDURE — 25510000001 IOPAMIDOL 61 % SOLUTION: Performed by: EMERGENCY MEDICINE

## 2024-06-23 PROCEDURE — 70491 CT SOFT TISSUE NECK W/DYE: CPT

## 2024-06-23 PROCEDURE — 87147 CULTURE TYPE IMMUNOLOGIC: CPT | Performed by: EMERGENCY MEDICINE

## 2024-06-23 PROCEDURE — 99285 EMERGENCY DEPT VISIT HI MDM: CPT

## 2024-06-23 PROCEDURE — 87880 STREP A ASSAY W/OPTIC: CPT | Performed by: EMERGENCY MEDICINE

## 2024-06-23 RX ORDER — ONDANSETRON 2 MG/ML
4 INJECTION INTRAMUSCULAR; INTRAVENOUS ONCE
Status: COMPLETED | OUTPATIENT
Start: 2024-06-23 | End: 2024-06-23

## 2024-06-23 RX ORDER — SODIUM CHLORIDE 0.9 % (FLUSH) 0.9 %
10 SYRINGE (ML) INJECTION AS NEEDED
Status: DISCONTINUED | OUTPATIENT
Start: 2024-06-23 | End: 2024-06-23 | Stop reason: HOSPADM

## 2024-06-23 RX ADMIN — SODIUM CHLORIDE, POTASSIUM CHLORIDE, SODIUM LACTATE AND CALCIUM CHLORIDE 1000 ML: 600; 310; 30; 20 INJECTION, SOLUTION INTRAVENOUS at 14:47

## 2024-06-23 RX ADMIN — ONDANSETRON 4 MG: 2 INJECTION INTRAMUSCULAR; INTRAVENOUS at 14:47

## 2024-06-23 RX ADMIN — IOPAMIDOL 75 ML: 612 INJECTION, SOLUTION INTRAVENOUS at 16:02

## 2024-06-23 NOTE — Clinical Note
[facility] EMERGENCY DEPARTMENT  [address] [place] [address]  Phone: [phone]    Dariel Schilling was seen and treated in our emergency department on 6/23/2024.  She may return to work on 06/26/2024.         Thank you for choosing [facility].    Nuha Davila MD

## 2024-06-23 NOTE — DISCHARGE INSTRUCTIONS
You are advised to follow closely with Dr Gaytan or primary care provider of your choice in 2-3 days for recheck, final results of lab work and imaging testing, and further testing/treatment as needed.    Avoid contact sports or any activities that could put you at risk for trauma to your torso.    Please return to the emergency department immediately with chest pain  shortness of air, abdominal pain, persistent vomiting/fever, blood in emesis or stool, lightheadedness/fainting, problems with speech, one sided weakness/numbness, new incontinence, problems with vision, difficulty swallowing or breathing, or for worsening of symptoms or other concerns.

## 2024-06-23 NOTE — ED TRIAGE NOTES
Pt c/o throat pain, N/V, generalized aches since Friday. Was seen here Friday for same; throat pain has gotten worse. Pt arrives aaox4, abc's intact, ambulatory with steady gait.

## 2024-06-23 NOTE — ED PROVIDER NOTES
EMERGENCY DEPARTMENT ENCOUNTER    CHIEF COMPLAINT  Chief Complaint: Sore throat  History given by: Patient  History limited by: Nothing  Room Number: 12/12  PMD: Beba Gaytan MD      HPI:  Pt is a 19 y.o. female presents complaining of sore throat escalating over the past 3 days.  Patient reports cough, reports Tmax of 101 during the past 24 hours, reports nasal drainage, denies shortness of air, reports pain with swallowing but denies inability to swallow.  Patient reports some mild epigastric discomfort, denies vomiting or diarrhea.  She does admit to poor p.o. intake over the past several days.    Patient reports LMP 5/27/2024, there is no chance she be pregnant.    Aggravating Factors: Swallowing  Alleviating Factors: Nothing  Treatment before arrival: Seen in ED yesterday with nausea, vomiting  Chronic or social conditions impacting care: None      Additional sources:  Discussed/ obtained information from independent historians: Patient gave entire history    External (non-ED) record review:   With virtual visit 5/29 complaining of headache, vomiting, cough, given prescription for Augmentin, Singulair, patient noted to have history of chronic abdominal pain, chest pain        PAST MEDICAL HISTORY  Active Ambulatory Problems     Diagnosis Date Noted    Left-sided weakness 10/04/2023    Routine health maintenance 05/08/2024    Chronic abdominal pain 05/29/2024    Chronic cough 05/29/2024    Seasonal allergies 05/29/2024    Nausea and vomiting 05/29/2024    Chronic chest pain 05/29/2024    Endometriosis 05/29/2024     Resolved Ambulatory Problems     Diagnosis Date Noted    No Resolved Ambulatory Problems     Past Medical History:   Diagnosis Date    Allergic     Anemia     Asthma     Chlamydia        PAST SURGICAL HISTORY  No past surgical history on file.    FAMILY HISTORY  Family History   Problem Relation Age of Onset    Breast cancer Maternal Great-Grandmother        SOCIAL HISTORY  Social History      Socioeconomic History    Marital status: Single   Tobacco Use    Smoking status: Never    Smokeless tobacco: Never   Vaping Use    Vaping status: Never Used   Substance and Sexual Activity    Alcohol use: Never    Drug use: Never     Types: Marijuana    Sexual activity: Yes     Partners: Female     Birth control/protection: Condom, Patch       ALLERGIES  Pomegranate [punica]    REVIEW OF SYSTEMS  Review of Systems    PHYSICAL EXAM  ED Triage Vitals   Temp Heart Rate Resp BP SpO2   06/23/24 1245 06/23/24 1245 06/23/24 1245 06/23/24 1253 06/23/24 1245   98 °F (36.7 °C) 84 17 118/71 100 %      Temp src Heart Rate Source Patient Position BP Location FiO2 (%)   06/23/24 1245 -- -- -- --   Tympanic           Physical Exam  Vitals and nursing note reviewed.   Constitutional:       General: She is in acute distress (mild).      Appearance: She is not toxic-appearing.   HENT:      Head: Normocephalic and atraumatic.      Mouth/Throat:      Mouth: Mucous membranes are moist.      Pharynx: Uvula midline. Pharyngeal swelling and posterior oropharyngeal erythema present. No oropharyngeal exudate.      Comments: Symmetric swelling bilateral peritonsillar pillars, no asymmetry of swelling, uvula midline,  posterior oropharynx easily visualized  Cardiovascular:      Rate and Rhythm: Normal rate and regular rhythm.      Pulses: Normal pulses.           Posterior tibial pulses are 2+ on the right side and 2+ on the left side.      Heart sounds: Normal heart sounds. No murmur heard.  Pulmonary:      Effort: Pulmonary effort is normal. No respiratory distress.      Breath sounds: Normal breath sounds.   Abdominal:      General: Bowel sounds are normal.      Palpations: Abdomen is soft.      Tenderness: There is abdominal tenderness (Mild, epigastric). There is no guarding or rebound.   Musculoskeletal:         General: Normal range of motion.      Cervical back: Normal range of motion and neck supple.   Skin:     General: Skin is  warm and dry.   Neurological:      Mental Status: She is alert and oriented to person, place, and time.   Psychiatric:         Mood and Affect: Affect normal.         LAB RESULTS  Recent Results (from the past 24 hour(s))   Rapid Strep A Screen - Swab, Throat    Collection Time: 06/23/24  2:01 PM    Specimen: Throat; Swab   Result Value Ref Range    Strep A Ag Negative Negative   Comprehensive Metabolic Panel    Collection Time: 06/23/24  2:04 PM    Specimen: Blood   Result Value Ref Range    Glucose 85 65 - 99 mg/dL    BUN 8 6 - 20 mg/dL    Creatinine 0.91 0.57 - 1.00 mg/dL    Sodium 139 136 - 145 mmol/L    Potassium 4.1 3.5 - 5.2 mmol/L    Chloride 102 98 - 107 mmol/L    CO2 24.0 22.0 - 29.0 mmol/L    Calcium 9.1 8.6 - 10.5 mg/dL    Total Protein 7.6 6.0 - 8.5 g/dL    Albumin 4.4 3.5 - 5.2 g/dL    ALT (SGPT) 7 1 - 33 U/L    AST (SGOT) 9 1 - 32 U/L    Alkaline Phosphatase 62 39 - 117 U/L    Total Bilirubin 0.2 0.0 - 1.2 mg/dL    Globulin 3.2 gm/dL    A/G Ratio 1.4 g/dL    BUN/Creatinine Ratio 8.8 7.0 - 25.0    Anion Gap 13.0 5.0 - 15.0 mmol/L    eGFR 92.8 >60.0 mL/min/1.73   CBC Auto Differential    Collection Time: 06/23/24  2:04 PM    Specimen: Blood   Result Value Ref Range    WBC 12.14 (H) 3.40 - 10.80 10*3/mm3    RBC 4.93 3.77 - 5.28 10*6/mm3    Hemoglobin 13.2 12.0 - 15.9 g/dL    Hematocrit 41.4 34.0 - 46.6 %    MCV 84.0 79.0 - 97.0 fL    MCH 26.8 26.6 - 33.0 pg    MCHC 31.9 31.5 - 35.7 g/dL    RDW 13.1 12.3 - 15.4 %    RDW-SD 40.4 37.0 - 54.0 fl    MPV 9.8 6.0 - 12.0 fL    Platelets 332 140 - 450 10*3/mm3    Neutrophil % 82.4 (H) 42.7 - 76.0 %    Lymphocyte % 11.9 (L) 19.6 - 45.3 %    Monocyte % 4.1 (L) 5.0 - 12.0 %    Eosinophil % 1.2 0.3 - 6.2 %    Basophil % 0.2 0.0 - 1.5 %    Immature Grans % 0.2 0.0 - 0.5 %    Neutrophils, Absolute 9.99 (H) 1.70 - 7.00 10*3/mm3    Lymphocytes, Absolute 1.44 0.70 - 3.10 10*3/mm3    Monocytes, Absolute 0.50 0.10 - 0.90 10*3/mm3    Eosinophils, Absolute 0.15 0.00 - 0.40  10*3/mm3    Basophils, Absolute 0.03 0.00 - 0.20 10*3/mm3    Immature Grans, Absolute 0.03 0.00 - 0.05 10*3/mm3    nRBC 0.0 0.0 - 0.2 /100 WBC       I ordered the above labs and reviewed the results    RADIOLOGY  Prominent adenoids, tonsils bilaterally inflamed but without abscess, lymphadenopathy and suprahyoid and jugulodigastric chain likely reactive, should be followed clinically.  Mucosal thickening in ethmoid, sphenoid and maxillary sinuses    I ordered the above noted radiological studies. Interpreted by radiologist. Viewed and interpreted by me in PACS -no peritonsillar abscess.       PROCEDURES  Procedures      MEDICATIONS GIVEN IN THE EMERGENCY DEPARTMENT  Medications   sodium chloride 0.9 % flush 10 mL (has no administration in time range)   lactated ringers bolus 1,000 mL (1,000 mL Intravenous New Bag 6/23/24 1447)   ondansetron (ZOFRAN) injection 4 mg (4 mg Intravenous Given 6/23/24 1447)           MEDICAL DECISION MAKING  Results were reviewed/discussed with the patient and they were also made aware of online access. Pt also made aware that some labs, such as cultures, will not be resulted during ER visit and followup with PMD is necessary.   EKGs and labs independently viewed and interpreted by me.  Discussion below represents my analysis of pertinent findings related to patient's condition, differential diagnosis, treatment plan and final disposition.    Differential diagnosis includes but is not limited to:  Postnasal drip, seasonal allergies, strep pharyngitis, viral syndrome, infectious mononucleosis, tonsillar stone, pharyngitis, epiglottitis, dehydration, thrush, trauma,    Independent interpretation of labs, radiology studies, and discussions with consultants:  ED Course as of 06/25/24 2157   [place] Jun 23, 2024   1632 Discussed imaging with Dr. Noah Arroyo, who reports patient's adenoids are large,, swollen tonsils right more prominent than left, however no signs of obvious abscess/phlegmon,  lymphadenopathy prominent diffuse findings consistent with reactive process [TO]   1640 Patient voices understanding of CT findings without abscess, need to follow-up with PCP for recheck and follow-up of enlarged lymph nodes due to risk of abnormal growth of cells such as cancer which could be life-threatening or disabling, however unlikely.  Hydrate well, follow-up with PCP for outpatient testing for infectious mononucleosis if symptoms persist.  Patient is aware her rapid strep test is negative, however there will be strep culture that will result within the next few days that needs follow-up and treatment with antibiotics if positive.  Patient agrees to follow-up closely with PCP for recheck, further testing, treatment as needed. [TO]      ED Course User Index  [TO] Nuha Davila MD         Shared decision making: Patient voices understanding of concern for infectious mononucleosis, as possibility and of her sore throat and swollen lymph nodes.  She agrees to follow closely with PCP, for recheck, further testing including testing for infectious mononucleosis as needed and further treatment.  Voices understanding of need to hydrate well, avoid contact sports or any activities that could put her at risk for trauma to her torso due to risk of solid organ rupture in setting of hepatosplenomegaly and viral syndrome of mono which could be life-threatening or disabling.      MDM     Amount and/or Complexity of Data Reviewed  Clinical lab tests: reviewed           DIAGNOSIS  Final diagnoses:   Cervical lymphadenopathy   Sore throat   Nausea and vomiting, unspecified vomiting type       DISPOSITION  DISCHARGE    Patient discharged in stable condition.    Reviewed implications of results, diagnosis, meds, responsibility to follow up, warning signs and symptoms of possible worsening, potential complications and reasons to return to ER    Patient/Family voiced understanding of above instructions.    Discussed plan for  discharge, as there is no emergent indication for admission. Patient referred to primary care provider for BP management due to today's BP. Pt/family is agreeable and understands need for follow up and repeat testing.  Pt is aware that discharge does not mean that nothing is wrong but it indicates no emergency is present that requires admission and they must continue care with follow-up as given below or physician of their choice.     FOLLOW-UP  Beba Gaytan MD  [address] [address]  [phone]    Schedule an appointment as soon as possible for a visit in 3 days  EVEN IF WELL         Medication List      No changes were made to your prescriptions during this visit.           Latest Documented Vital Signs:  As of 15:47 EDT  BP- 122/74 HR- 82 Temp- 98 °F (36.7 °C) (Tympanic) O2 sat- 96%    --      Please note that portions of this note were completed with a voice recognition program.       Note Disclaimer: At [facility], we believe that sharing information builds trust and better relationships. You are receiving this note because you are receiving care at [facility] or recently visited. It is possible you will see health information before a provider has talked with you about it. This kind of information can be easy to misunderstand. To help you fully understand what it means for your health, we urge you to discuss this note with your provider.     Nuha Davila MD  06/25/24 3139

## 2024-06-25 LAB — BACTERIA SPEC AEROBE CULT: ABNORMAL

## 2024-06-26 ENCOUNTER — OFFICE VISIT (OUTPATIENT)
Dept: FAMILY MEDICINE CLINIC | Facility: CLINIC | Age: 19
End: 2024-06-26
Payer: MEDICAID

## 2024-06-26 VITALS
HEIGHT: 65 IN | HEART RATE: 102 BPM | SYSTOLIC BLOOD PRESSURE: 102 MMHG | OXYGEN SATURATION: 99 % | DIASTOLIC BLOOD PRESSURE: 58 MMHG | BODY MASS INDEX: 21.09 KG/M2 | WEIGHT: 126.6 LBS

## 2024-06-26 DIAGNOSIS — R10.9 CHRONIC ABDOMINAL PAIN: ICD-10-CM

## 2024-06-26 DIAGNOSIS — Z76.89 FREQUENT PATIENT IN EMERGENCY DEPARTMENT: ICD-10-CM

## 2024-06-26 DIAGNOSIS — J35.2 ADENOID HYPERTROPHY: ICD-10-CM

## 2024-06-26 DIAGNOSIS — J02.0 RECURRENT STREPTOCOCCAL PHARYNGITIS: ICD-10-CM

## 2024-06-26 DIAGNOSIS — R11.2 NAUSEA AND VOMITING, UNSPECIFIED VOMITING TYPE: ICD-10-CM

## 2024-06-26 DIAGNOSIS — N80.9 ENDOMETRIOSIS: ICD-10-CM

## 2024-06-26 DIAGNOSIS — J03.90 TONSILLITIS: Primary | ICD-10-CM

## 2024-06-26 DIAGNOSIS — R07.9 CHRONIC CHEST PAIN: ICD-10-CM

## 2024-06-26 DIAGNOSIS — G89.29 CHRONIC CHEST PAIN: ICD-10-CM

## 2024-06-26 DIAGNOSIS — J32.9 CHRONIC SINUSITIS, UNSPECIFIED LOCATION: ICD-10-CM

## 2024-06-26 DIAGNOSIS — G89.29 CHRONIC ABDOMINAL PAIN: ICD-10-CM

## 2024-06-26 PROCEDURE — 1159F MED LIST DOCD IN RCRD: CPT | Performed by: INTERNAL MEDICINE

## 2024-06-26 PROCEDURE — 99214 OFFICE O/P EST MOD 30 MIN: CPT | Performed by: INTERNAL MEDICINE

## 2024-06-26 PROCEDURE — 1160F RVW MEDS BY RX/DR IN RCRD: CPT | Performed by: INTERNAL MEDICINE

## 2024-06-26 RX ORDER — METHYLPREDNISOLONE 4 MG/1
TABLET ORAL SEE ADMIN INSTRUCTIONS
COMMUNITY
Start: 2024-06-25

## 2024-06-26 RX ORDER — AMOXICILLIN AND CLAVULANATE POTASSIUM 875; 125 MG/1; MG/1
1 TABLET, FILM COATED ORAL
COMMUNITY
Start: 2024-06-25 | End: 2024-07-05

## 2024-06-26 RX ORDER — DICLOFENAC SODIUM 75 MG/1
75 TABLET, DELAYED RELEASE ORAL
COMMUNITY
Start: 2024-06-25

## 2024-06-26 NOTE — PROGRESS NOTES
"Chief Complaint  Hospital Follow Up Visit    Subjective        HPI   Dariel presents to [facility] for ED follow up. Recent ED visits for severe sore throat, cervical LAD, nausea and vomiting, and headache. She was been given a medrol dose saloni and Augmentin by ED physician yesterday and she now is starting to feel better. She was having a lot of neck pain which has really improved. She has had mono in the past. She was supposed to have her tonsils out when she was a child but never did. Hx recurrent strep infection. Today she looks pretty good. She is able to eat and drink. Pain is better. Neck pain and headaches are better. LN swelling still present but better.         Objective   Vital Signs:  Vitals:    06/26/24 1136   BP: 102/58   BP Location: Left arm   Patient Position: Sitting   Cuff Size: Adult   Pulse: 102   SpO2: 99%   Weight: 57.4 kg (126 lb 9.6 oz)   Height: 165.1 cm (65\")          Physical Exam  Vitals reviewed.   Constitutional:       General: She is not in acute distress.     Appearance: Normal appearance. She is not ill-appearing or toxic-appearing.   HENT:      Head: Normocephalic and atraumatic.      Right Ear: Tympanic membrane normal.      Left Ear: Tympanic membrane normal.      Mouth/Throat:      Mouth: Mucous membranes are moist. No oral lesions.      Pharynx: Uvula midline. Posterior oropharyngeal erythema present. No oropharyngeal exudate.      Tonsils: 3+ on the right. 3+ on the left.   Eyes:      General:         Right eye: No discharge.         Left eye: No discharge.      Conjunctiva/sclera: Conjunctivae normal.   Cardiovascular:      Rate and Rhythm: Normal rate.   Pulmonary:      Effort: Pulmonary effort is normal.   Musculoskeletal:         General: No swelling or deformity.      Cervical back: Normal range of motion and neck supple. No rigidity or tenderness.   Lymphadenopathy:      Cervical: Cervical adenopathy present.   Skin:     Coloration: Skin is " not jaundiced.      Findings: No rash.   Neurological:      General: No focal deficit present.      Mental Status: She is alert and oriented to person, place, and time.   Psychiatric:         Mood and Affect: Mood normal.         Behavior: Behavior normal.         Judgment: Judgment normal.          Result Review :     The following data was reviewed by: Beba Gaytan MD on 06/26/2024:  Lipase (06/22/2024 00:43)  Comprehensive Metabolic Panel (06/22/2024 00:43)  hCG, Serum, Qualitative (06/22/2024 00:43)  CBC & Differential (06/22/2024 00:43)  Rapid Strep A Screen - Swab, Throat (06/23/2024 14:01)  Beta Strep Culture, Throat - Swab, Throat (06/23/2024 14:01)  CBC & Differential (06/23/2024 14:04)  Comprehensive Metabolic Panel (06/23/2024 14:04)  Hemoglobin (06/24/2024 12:23)  CT Soft Tissue Neck With Contrast (06/23/2024 16:03)          Assessment and Plan    Diagnoses and all orders for this visit:    1. Tonsillitis (Primary)  -     Ambulatory Referral to ENT (Otolaryngology)  -     EBV Antibody Profile    2. Adenoid hypertrophy  -     Ambulatory Referral to ENT (Otolaryngology)  -     EBV Antibody Profile    3. Chronic sinusitis, unspecified location    4. Frequent patient in emergency department  -     Ambulatory Referral to Case Management Care Coordination, High Risk for ED/Readmission, Disease Education    5. Chronic abdominal pain  -     Ambulatory Referral to Case Management Care Coordination, High Risk for ED/Readmission, Disease Education    6. Nausea and vomiting, unspecified vomiting type  -     Ambulatory Referral to Case Management Care Coordination, High Risk for ED/Readmission, Disease Education    7. Chronic chest pain  -     Ambulatory Referral to Case Management Care Coordination, High Risk for ED/Readmission, Disease Education    8. Endometriosis  -     Ambulatory Referral to Case Management Care Coordination, High Risk for ED/Readmission, Disease Education    9. Recurrent streptococcal  pharyngitis  -     Ambulatory Referral to ENT (Otolaryngology)    She is doing better than I expected based on what I was reading in her ED notes. I think steroids and abx (Augmentin and Medrol) are kicking in. She is eating and drinking now. No nuchal rigidity to make me think she has meningitis. Tonsils are rather enlarged and she has tender cervical LAD. Throat cx with GBS. Has had mono but will send off EBV testing today. Hx recurrent strep/tonsillitis with impressive CT neck, ordered ASAP ENT referral. Will also consult case management due to frequent ED visits, case complexity to ensure she gets the care she needs. Porphyria work up is pending, confirmed with our phlebotomists that it was sent off and in progress.      Follow Up   No follow-ups on file. She has an appt in July  Patient was given instructions and counseling regarding her condition or for health maintenance advice. Please see specific information pulled into the AVS if appropriate.

## 2024-06-27 ENCOUNTER — REFERRAL TRIAGE (OUTPATIENT)
Dept: CASE MANAGEMENT | Facility: OTHER | Age: 19
End: 2024-06-27
Payer: MEDICAID

## 2024-06-27 LAB
COPRO1 UR-MCNC: 26 UG/L (ref 0–15)
COPRO3 UR-MCNC: 92 UG/L (ref 0–49)
CREAT UR-MCNC: 244.3 MG/DL
EBV NA IGG SER IA-ACNC: >600 U/ML (ref 0–17.9)
EBV VCA IGG SER IA-ACNC: 176 U/ML (ref 0–17.9)
EBV VCA IGM SER IA-ACNC: <36 U/ML (ref 0–35.9)
HEPTA-CP UR-MCNC: <1 UG/L (ref 0–2)
HEXA-CP UR-MCNC: <1 UG/L (ref 0–1)
PBG UR-MCNC: NORMAL MG/L
PENTA-CP UR-MCNC: <1 UG/L (ref 0–2)
SERVICE CMNT-IMP: ABNORMAL
SPECIMEN STATUS: NORMAL
UROPOR UR-MCNC: 2 UG/L (ref 0–20)

## 2024-07-02 ENCOUNTER — OFFICE VISIT (OUTPATIENT)
Dept: OBSTETRICS AND GYNECOLOGY | Facility: CLINIC | Age: 19
End: 2024-07-02

## 2024-07-02 VITALS
WEIGHT: 130 LBS | SYSTOLIC BLOOD PRESSURE: 117 MMHG | HEIGHT: 65 IN | BODY MASS INDEX: 21.66 KG/M2 | DIASTOLIC BLOOD PRESSURE: 79 MMHG

## 2024-07-02 DIAGNOSIS — Z11.3 SCREENING FOR STD (SEXUALLY TRANSMITTED DISEASE): Primary | ICD-10-CM

## 2024-07-02 NOTE — PROGRESS NOTES
"Chief Complaint   Patient presents with    Follow-up     Pt here today for STD testing         SUBJECTIVE:     Dariel Schilling is a 19 y.o.  who presents requesting STD testing. Denies current symptoms. Declines serum testing today.  LMP 6/28/24.     Past Medical History:   Diagnosis Date    Allergic     Anemia     Asthma     Chlamydia     Visual impairment       Past Surgical History:   Procedure Laterality Date    COLONOSCOPY          Review of Systems   Constitutional:  Negative for chills, fatigue and fever.   Gastrointestinal:  Negative for abdominal distention and abdominal pain.   Genitourinary:  Negative for dyspareunia, dysuria, menstrual problem, pelvic pain, vaginal bleeding, vaginal discharge and vaginal pain.       OBJECTIVE:   Vitals:    07/02/24 1143   BP: 117/79   Weight: 59 kg (130 lb)   Height: 165.1 cm (65\")        Physical Exam  Constitutional:       General: She is not in acute distress.     Appearance: Normal appearance. She is not ill-appearing, toxic-appearing or diaphoretic.   Genitourinary:      Bladder and urethral meatus normal.      No lesions in the vagina.      Right Labia: No rash, tenderness, lesions, skin changes or Bartholin's cyst.     Left Labia: No tenderness, lesions, skin changes, Bartholin's cyst or rash.     No labial fusion noted.      Vulva exam comments: 2 linear scars each on both R and L buttocks.      No inguinal adenopathy present in the right or left side.     No vaginal discharge, erythema, tenderness, bleeding, ulceration or granulation tissue.      No vaginal prolapse present.     No vaginal atrophy present.       Right Adnexa: not tender, not full, not palpable, no mass present and not absent.     Left Adnexa: not tender, not full, not palpable, no mass present and not absent.     No cervical motion tenderness, discharge, friability, lesion, polyp, nabothian cyst or eversion.      Uterus is not enlarged, fixed, tender, irregular or prolapsed.      No uterine " mass detected.  Abdominal:      General: There is no distension.      Palpations: Abdomen is soft. There is no mass.      Tenderness: There is no abdominal tenderness. There is no guarding.      Hernia: No hernia is present. There is no hernia in the left inguinal area or right inguinal area.   Lymphadenopathy:      Lower Body: No right inguinal adenopathy. No left inguinal adenopathy.   Neurological:      General: No focal deficit present.      Mental Status: She is alert and oriented to person, place, and time.      Cranial Nerves: No cranial nerve deficit.   Skin:     General: Skin is warm and dry.   Psychiatric:         Mood and Affect: Mood normal.         Behavior: Behavior normal.         Thought Content: Thought content normal.         Judgment: Judgment normal.   Vitals and nursing note reviewed.         Assessment/Plan    Diagnoses and all orders for this visit:    1. Screening for STD (sexually transmitted disease) (Primary)  -     Chlamydia trachomatis, Neisseria gonorrhoeae, Trichomonas vaginalis, PCR - Swab, Cervix    Cultures obtained  Declines serum testing  2 linear scars noted bilateral buttocks. She reports she accidentally sat on a flat iron, that she did not know was hot, in early March. Skin has healed well.     Return if symptoms worsen or fail to improve.    I spent 20 minutes caring for Dariel on this date of service. This time includes time spent by me in the following activities: preparing for the visit, reviewing tests, performing a medically appropriate examination and/or evaluation, counseling and educating the patient/family/caregiver, referring and communicating with other health care professionals, documenting information in the medical record, independently interpreting results and communicating that information with the patient/family/caregiver, ordering test(s), obtaining a separately obtained history, and reviewing a separately obtained history    Tricia Merritt,  APRN  7/2/2024  12:10 EDT

## 2024-07-04 LAB
C TRACH RRNA SPEC QL NAA+PROBE: NEGATIVE
N GONORRHOEA RRNA SPEC QL NAA+PROBE: NEGATIVE
T VAGINALIS RRNA SPEC QL NAA+PROBE: NEGATIVE

## 2024-07-08 DIAGNOSIS — R10.9 CHRONIC ABDOMINAL PAIN: Primary | ICD-10-CM

## 2024-07-08 DIAGNOSIS — G89.29 CHRONIC ABDOMINAL PAIN: Primary | ICD-10-CM

## 2024-07-08 DIAGNOSIS — G89.29 CHRONIC CHEST PAIN: ICD-10-CM

## 2024-07-08 DIAGNOSIS — R07.9 CHRONIC CHEST PAIN: ICD-10-CM

## 2024-07-09 ENCOUNTER — OFFICE VISIT (OUTPATIENT)
Dept: FAMILY MEDICINE CLINIC | Facility: CLINIC | Age: 19
End: 2024-07-09
Payer: MEDICAID

## 2024-07-09 VITALS
HEART RATE: 104 BPM | BODY MASS INDEX: 22.39 KG/M2 | WEIGHT: 134.4 LBS | HEIGHT: 65 IN | SYSTOLIC BLOOD PRESSURE: 104 MMHG | DIASTOLIC BLOOD PRESSURE: 66 MMHG | OXYGEN SATURATION: 98 %

## 2024-07-09 DIAGNOSIS — Z00.00 ANNUAL PHYSICAL EXAM: Primary | ICD-10-CM

## 2024-07-09 DIAGNOSIS — B99.9 RECURRENT INFECTIONS: ICD-10-CM

## 2024-07-09 DIAGNOSIS — Z00.00 ROUTINE HEALTH MAINTENANCE: ICD-10-CM

## 2024-07-09 DIAGNOSIS — F12.90 MARIJUANA USE: ICD-10-CM

## 2024-07-09 DIAGNOSIS — R10.9 CHRONIC ABDOMINAL PAIN: ICD-10-CM

## 2024-07-09 DIAGNOSIS — Z23 NEED FOR VACCINATION: ICD-10-CM

## 2024-07-09 DIAGNOSIS — R07.9 CHRONIC CHEST PAIN: ICD-10-CM

## 2024-07-09 DIAGNOSIS — R11.2 NAUSEA AND VOMITING, UNSPECIFIED VOMITING TYPE: ICD-10-CM

## 2024-07-09 DIAGNOSIS — G89.29 CHRONIC CHEST PAIN: ICD-10-CM

## 2024-07-09 DIAGNOSIS — G89.29 CHRONIC ABDOMINAL PAIN: ICD-10-CM

## 2024-07-09 PROBLEM — J45.909 ASTHMA: Status: ACTIVE | Noted: 2024-07-09

## 2024-07-09 PROBLEM — R05.3 CHRONIC COUGH: Status: RESOLVED | Noted: 2024-05-29 | Resolved: 2024-07-09

## 2024-07-09 PROCEDURE — 90471 IMMUNIZATION ADMIN: CPT | Performed by: INTERNAL MEDICINE

## 2024-07-09 PROCEDURE — 99395 PREV VISIT EST AGE 18-39: CPT | Performed by: INTERNAL MEDICINE

## 2024-07-09 PROCEDURE — 2014F MENTAL STATUS ASSESS: CPT | Performed by: INTERNAL MEDICINE

## 2024-07-09 PROCEDURE — 90677 PCV20 VACCINE IM: CPT | Performed by: INTERNAL MEDICINE

## 2024-07-09 NOTE — ASSESSMENT & PLAN NOTE
Recommended reduction in use, told her that it potentially can cause worsening nausea (cannabinoid hyperemesis)

## 2024-07-09 NOTE — PROGRESS NOTES
Chief Complaint  Annual Exam    Subjective        HPI   Dariel presents to [facility] for annual physical. She is accompanied by her mother who provides some of the history. 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    Lung cancer screening: Not indicated, no tobacco use  Colon cancer screening: Age 44, average risk  Breast cancer screening: Age 39, has OB/GYN  Recent negative STI screen, HIV and HCV neg 5/2024  Cervical cancer screening: Age 20, has OB/GYN  Contraception: Using contraceptive patch  Discussed limiting or avoidance of alcohol, protecting drinks, etc.  Does not use tobacco  MJ use daily--encouraged cutting down  No caffeine intake  Discussed recommendation for seatbelt and sunscreen use   Discussed recommendation for yearly eye and dental exams  Vaccines: Mom is sure she had HPV vaccine series.  Prevnar 20 today due to history of asthma.  Recommended yearly COVID-vaccine  Diet and exercise habits discussed  ??not eating enough, skipping some meals  Enjoys walking, working on increasing exercise  Discussed ways to increase fruit/vegetable, protein intake in her diet.  Reduce processed and fast foods.  FH:  MGM: breast cancer in 80s  Great grandmother: Cervical and ovarian cancer  PGF and uncle: Lung cancer  No autoimmune issues in family  No early heart disease    Separate from physical/wellness visit, we discussed the following:    Lymph nodes and neck are still little swollen, but much better  Sore throat much better although not fully gone  States she did not hear from ENT office  Some pressure in her nasal cavity.    Relates that she was constantly sick as a kid, would have periods of recurrent illness and then be okay, then get really sick again.  She had recurrent strep pharyngitis, almost had adenoids out due to some apneic episodes when sleeping and recurrent strep but ultimately never did    Patient not currently having issues with chest pain, abdominal pain, seem to be  "intermittent in nature  Does smoke marijuana daily, does not feel as if it makes her nausea worse, but rather feels as if it makes it better.  Has seen a hematologist in the past, used to require iron infusions.     Cough is now gone (had long-standing cough after parainfluenza). Has hx asthma.     Objective   Vital Signs:  Vitals:    07/09/24 1544   BP: 104/66   BP Location: Left arm   Patient Position: Sitting   Cuff Size: Adult   Pulse: 104   SpO2: 98%   Weight: 61 kg (134 lb 6.4 oz)   Height: 165.1 cm (65\")          Physical Exam  Constitutional:       General: She is not in acute distress.     Appearance: Normal appearance. She is not ill-appearing or toxic-appearing.   HENT:      Head: Normocephalic and atraumatic.      Right Ear: Tympanic membrane and ear canal normal.      Left Ear: Tympanic membrane and ear canal normal.      Mouth/Throat:      Mouth: Mucous membranes are moist.      Pharynx: Oropharynx is clear. No oropharyngeal exudate or posterior oropharyngeal erythema.      Comments: Tonsillar hypertrophy is improved from prior  Eyes:      General: No scleral icterus.        Right eye: No discharge.         Left eye: No discharge.   Neck:      Comments: She still has some cervical adenopathy, especially on the right, but much improved from prior  Cardiovascular:      Rate and Rhythm: Normal rate.      Heart sounds: Normal heart sounds. No murmur heard.     No gallop.   Pulmonary:      Effort: Pulmonary effort is normal. No respiratory distress.      Breath sounds: Normal breath sounds. No wheezing or rhonchi.   Abdominal:      General: Bowel sounds are normal. There is no distension.      Palpations: Abdomen is soft.      Tenderness: There is no abdominal tenderness. There is no guarding or rebound.   Musculoskeletal:         General: No swelling, tenderness or deformity. Normal range of motion.      Cervical back: Normal range of motion and neck supple. No rigidity.   Lymphadenopathy:      Cervical: " Cervical adenopathy present.   Skin:     General: Skin is warm and dry.      Coloration: Skin is not jaundiced or pale.      Findings: No lesion or rash.   Neurological:      General: No focal deficit present.      Mental Status: She is alert and oriented to person, place, and time.   Psychiatric:         Mood and Affect: Mood normal.         Behavior: Behavior normal.         Judgment: Judgment normal.          Result Review :     The following data was reviewed by: Beba Gaytan MD on 07/09/2024:  Office Visit with Tricia Merritt APRN (07/02/2024)   Chlamydia trachomatis, Neisseria gonorrhoeae, Trichomonas vaginalis, PCR - Swab, Cervix (07/02/2024 15:33)          Assessment and Plan    Diagnoses and all orders for this visit:    1. Annual physical exam (Primary)    2. Chronic abdominal pain    3. Chronic chest pain    4. Nausea and vomiting, unspecified vomiting type    5. Need for vaccination  -     Pneumococcal Conjugate Vaccine 20-Valent (PCV20)    6. Marijuana use  Assessment & Plan:  Recommended reduction in use, told her that it potentially can cause worsening nausea (cannabinoid hyperemesis)      7. Recurrent infections    8. Routine health maintenance  Assessment & Plan:  Lung cancer screening: Not indicated, no tobacco use  Colon cancer screening: Age 44, average risk  Breast cancer screening: Age 39, has OB/GYN  Recent negative STI screen, HIV and HCV neg 5/2024  Cervical cancer screening: Age 20, has OB/GYN  Contraception: Using contraceptive patch  Discussed limiting or avoidance of alcohol, protecting drinks, etc.  Does not use tobacco  MJ use daily--encouraged cutting down  No caffeine intake  Discussed recommendation for seatbelt and sunscreen use   Discussed recommendation for yearly eye and dental exams  Vaccines: Mom is sure she had HPV vaccine series.  Prevnar 20 today due to history of asthma.  Recommended yearly COVID-vaccine  Diet and exercise habits discussed  FH updated per  HPI      This is a complex case.  Patient's mother came to the visit today and relates that she had issues with recurrent infections as a child.  She never saw an immunologist, did almost need tonsils/adenoids out due to recurrent strep and apneic episodes at night.  I do wonder if she could have a mild immunodeficiency coming into play and recommended consideration of an allergy/immunology consult.  Given everything going on, patient prefers to hold off on that.  She does need to see ENT due to recent severe tonsillitis cellulitis/adenoiditis.  Clinically she is improving from that but still needs evaluation.  I sent a note to our referrals coordinator about status of that referral.    Please see my prior notes for further context but Dariel presents with a complex clinical picture of longstanding and mostly unexplained abdominal pain, nausea, vomiting, chest pain, recurrent infections, anemia, and other musculoskeletal concerns.  These issues appear to be intermittent but recurrent. She has innumerable emergency department visits and specialist consultations for these issues without obvious etiology/unifying diagnosis.  One of the unifying diagnoses I was concerned about is porphyria. Porphyrins, Qn, Random Urine - Urine, Clean Catch (06/24/2024 13:37) Interestingly, Coproporphyrin I and II tests were elevated. Porphobilinogen pending, had to be recollected.  Once I get that back, I plan to refer her to hematology to get their thoughts on this as a potential diagnosis.  I certainly could be wrong but it also could explain many of her symptoms.  I still do want her to see gastroenterology, sent a note to referrals coordinator to reopen that referral.      Follow Up   Return in about 3 months (around 10/9/2024), or if symptoms worsen or fail to improve.  Patient was given instructions and counseling regarding her condition or for health maintenance advice. Please see specific information pulled into the AVS if  appropriate.     I notified the patient that I will be transitioning out of my role as a primary care physician at [facility] effective mid-September. Patient was given a letter with a list of [facility] PCPs who are taking new patients. I recommended that they establish care with one of them to ensure continuity of care.

## 2024-07-09 NOTE — ASSESSMENT & PLAN NOTE
Lung cancer screening: Not indicated, no tobacco use  Colon cancer screening: Age 44, average risk  Breast cancer screening: Age 39, has OB/GYN  Recent negative STI screen, HIV and HCV neg 5/2024  Cervical cancer screening: Age 20, has OB/GYN  Contraception: Using contraceptive patch  Discussed limiting or avoidance of alcohol, protecting drinks, etc.  Does not use tobacco  MJ use daily--encouraged cutting down  No caffeine intake  Discussed recommendation for seatbelt and sunscreen use   Discussed recommendation for yearly eye and dental exams  Vaccines: Mom is sure she had HPV vaccine series.  Prevnar 20 today due to history of asthma.  Recommended yearly COVID-vaccine  Diet and exercise habits discussed  FH updated per HPI

## 2024-07-10 DIAGNOSIS — R11.2 NAUSEA AND VOMITING, UNSPECIFIED VOMITING TYPE: ICD-10-CM

## 2024-07-10 DIAGNOSIS — R07.9 CHRONIC CHEST PAIN: ICD-10-CM

## 2024-07-10 DIAGNOSIS — G89.29 CHRONIC CHEST PAIN: ICD-10-CM

## 2024-07-10 DIAGNOSIS — G89.29 CHRONIC ABDOMINAL PAIN: Primary | ICD-10-CM

## 2024-07-10 DIAGNOSIS — R10.9 CHRONIC ABDOMINAL PAIN: Primary | ICD-10-CM

## 2024-07-10 LAB — PBG UR-MCNC: 1 MG/L (ref 0–2)

## 2024-07-15 ENCOUNTER — TELEPHONE (OUTPATIENT)
Dept: FAMILY MEDICINE CLINIC | Facility: CLINIC | Age: 19
End: 2024-07-15
Payer: MEDICAID

## 2024-07-15 NOTE — TELEPHONE ENCOUNTER
Per hematology regarding referral : ROUTING BACK TO AMBULATORY - WE DO NOT SEE FOR THESE DIAGNOSES - WE NEED A SPECIFIC HEMATOLOGY PRIOR TO SCHEDULING - PLEASE UPDATE REFERRAL AND ROUTE ACCORDINGLY.

## 2024-07-16 ENCOUNTER — TELEPHONE (OUTPATIENT)
Dept: FAMILY MEDICINE CLINIC | Facility: CLINIC | Age: 19
End: 2024-07-16
Payer: MEDICAID

## 2024-07-16 NOTE — TELEPHONE ENCOUNTER
"See msg below from scheduling regarding hematology referral  \" WE DO NOT SEE FOR THESE DIAGNOSES - WE NEED A SPECIFIC HEMATOLOGY PRIOR TO SCHEDULING - PLEASE UPDATE REFERRAL AND ROUTE ACCORDINGLY.      "

## 2024-08-02 ENCOUNTER — PATIENT OUTREACH (OUTPATIENT)
Dept: CASE MANAGEMENT | Facility: OTHER | Age: 19
End: 2024-08-02
Payer: MEDICAID

## 2024-08-02 NOTE — OUTREACH NOTE
AMBULATORY CASE MANAGEMENT NOTE    Names and Relationships of Patient/Support Persons:  -     Patient Outreach    Attempted outreach to patient for High Risk Case Management regarding recent physician referral for medication coordination and education. No successful contact x 3. Voicemail left with [facility] contact info. PCP notified of program closure. No further outreach scheduled at this time.       Education Documentation  No documentation found.        Sandra AVILA  Ambulatory Case Management    8/2/2024, 18:03 EDT

## 2024-09-02 ENCOUNTER — HOSPITAL ENCOUNTER (EMERGENCY)
Facility: HOSPITAL | Age: 19
Discharge: HOME | End: 2024-09-02
Attending: EMERGENCY MEDICINE

## 2024-09-02 VITALS
OXYGEN SATURATION: 95 % | HEIGHT: 65 IN | TEMPERATURE: 97.1 F | SYSTOLIC BLOOD PRESSURE: 115 MMHG | DIASTOLIC BLOOD PRESSURE: 84 MMHG | WEIGHT: 135 LBS | BODY MASS INDEX: 22.49 KG/M2 | HEART RATE: 79 BPM | RESPIRATION RATE: 18 BRPM

## 2024-09-02 DIAGNOSIS — B34.9 VIRAL ILLNESS: ICD-10-CM

## 2024-09-02 DIAGNOSIS — R10.84 GENERALIZED ABDOMINAL PAIN: Primary | ICD-10-CM

## 2024-09-02 LAB
ALBUMIN SERPL-MCNC: 4.1 G/DL (ref 3.5–5.2)
ALBUMIN/GLOB SERPL: 1.5 G/DL
ALP SERPL-CCNC: 61 U/L (ref 39–117)
ALT SERPL W P-5'-P-CCNC: 8 U/L (ref 1–33)
ANION GAP SERPL CALCULATED.3IONS-SCNC: 13.3 MMOL/L (ref 5–15)
AST SERPL-CCNC: 13 U/L (ref 1–32)
BACTERIA UR QL AUTO: ABNORMAL /HPF
BASOPHILS # BLD AUTO: 0.02 10*3/MM3 (ref 0–0.2)
BASOPHILS NFR BLD AUTO: 0.5 % (ref 0–1.5)
BILIRUB SERPL-MCNC: 0.2 MG/DL (ref 0–1.2)
BILIRUB UR QL STRIP: NEGATIVE
BUN SERPL-MCNC: 7 MG/DL (ref 6–20)
BUN/CREAT SERPL: 11.5 (ref 7–25)
CALCIUM SPEC-SCNC: 8.9 MG/DL (ref 8.6–10.5)
CHLORIDE SERPL-SCNC: 103 MMOL/L (ref 98–107)
CLARITY UR: CLEAR
CO2 SERPL-SCNC: 22.7 MMOL/L (ref 22–29)
COLOR UR: YELLOW
CREAT SERPL-MCNC: 0.61 MG/DL (ref 0.57–1)
DEPRECATED RDW RBC AUTO: 39.4 FL (ref 37–54)
EGFRCR SERPLBLD CKD-EPI 2021: 131.4 ML/MIN/1.73
EOSINOPHIL # BLD AUTO: 0.51 10*3/MM3 (ref 0–0.4)
EOSINOPHIL NFR BLD AUTO: 11.5 % (ref 0.3–6.2)
ERYTHROCYTE [DISTWIDTH] IN BLOOD BY AUTOMATED COUNT: 12.8 % (ref 12.3–15.4)
FLUAV SUBTYP SPEC NAA+PROBE: NOT DETECTED
FLUBV RNA ISLT QL NAA+PROBE: NOT DETECTED
GLOBULIN UR ELPH-MCNC: 2.8 GM/DL
GLUCOSE SERPL-MCNC: 95 MG/DL (ref 65–99)
GLUCOSE UR STRIP-MCNC: NEGATIVE MG/DL
HCG SERPL QL: NEGATIVE
HCT VFR BLD AUTO: 38.7 % (ref 34–46.6)
HGB BLD-MCNC: 12.3 G/DL (ref 12–15.9)
HGB UR QL STRIP.AUTO: NEGATIVE
HYALINE CASTS UR QL AUTO: ABNORMAL /LPF
IMM GRANULOCYTES # BLD AUTO: 0.01 10*3/MM3 (ref 0–0.05)
IMM GRANULOCYTES NFR BLD AUTO: 0.2 % (ref 0–0.5)
KETONES UR QL STRIP: NEGATIVE
LEUKOCYTE ESTERASE UR QL STRIP.AUTO: ABNORMAL
LYMPHOCYTES # BLD AUTO: 1.41 10*3/MM3 (ref 0.7–3.1)
LYMPHOCYTES NFR BLD AUTO: 31.8 % (ref 19.6–45.3)
MCH RBC QN AUTO: 26.9 PG (ref 26.6–33)
MCHC RBC AUTO-ENTMCNC: 31.8 G/DL (ref 31.5–35.7)
MCV RBC AUTO: 84.7 FL (ref 79–97)
MONOCYTES # BLD AUTO: 0.28 10*3/MM3 (ref 0.1–0.9)
MONOCYTES NFR BLD AUTO: 6.3 % (ref 5–12)
NEUTROPHILS NFR BLD AUTO: 2.2 10*3/MM3 (ref 1.7–7)
NEUTROPHILS NFR BLD AUTO: 49.7 % (ref 42.7–76)
NITRITE UR QL STRIP: NEGATIVE
NRBC BLD AUTO-RTO: 0 /100 WBC (ref 0–0.2)
PH UR STRIP.AUTO: 7.5 [PH] (ref 5–8)
PLATELET # BLD AUTO: 323 10*3/MM3 (ref 140–450)
PMV BLD AUTO: 9.9 FL (ref 6–12)
POTASSIUM SERPL-SCNC: 3.7 MMOL/L (ref 3.5–5.2)
PROT SERPL-MCNC: 6.9 G/DL (ref 6–8.5)
PROT UR QL STRIP: NEGATIVE
RBC # BLD AUTO: 4.57 10*6/MM3 (ref 3.77–5.28)
RBC # UR STRIP: ABNORMAL /HPF
REF LAB TEST METHOD: ABNORMAL
SARS-COV-2 RNA RESP QL NAA+PROBE: NOT DETECTED
SODIUM SERPL-SCNC: 139 MMOL/L (ref 136–145)
SP GR UR STRIP: 1.01 (ref 1–1.03)
SQUAMOUS #/AREA URNS HPF: ABNORMAL /HPF
UROBILINOGEN UR QL STRIP: ABNORMAL
WBC # UR STRIP: ABNORMAL /HPF
WBC NRBC COR # BLD AUTO: 4.43 10*3/MM3 (ref 3.4–10.8)

## 2024-09-02 PROCEDURE — 36415 COLL VENOUS BLD VENIPUNCTURE: CPT

## 2024-09-02 PROCEDURE — 87636 SARSCOV2 & INF A&B AMP PRB: CPT | Performed by: PHYSICIAN ASSISTANT

## 2024-09-02 PROCEDURE — 96375 TX/PRO/DX INJ NEW DRUG ADDON: CPT

## 2024-09-02 PROCEDURE — 25010000002 ONDANSETRON PER 1 MG: Performed by: PHYSICIAN ASSISTANT

## 2024-09-02 PROCEDURE — 84703 CHORIONIC GONADOTROPIN ASSAY: CPT | Performed by: PHYSICIAN ASSISTANT

## 2024-09-02 PROCEDURE — 80053 COMPREHEN METABOLIC PANEL: CPT | Performed by: PHYSICIAN ASSISTANT

## 2024-09-02 PROCEDURE — 25010000002 KETOROLAC TROMETHAMINE PER 15 MG: Performed by: PHYSICIAN ASSISTANT

## 2024-09-02 PROCEDURE — 81001 URINALYSIS AUTO W/SCOPE: CPT | Performed by: PHYSICIAN ASSISTANT

## 2024-09-02 PROCEDURE — 85025 COMPLETE CBC W/AUTO DIFF WBC: CPT | Performed by: PHYSICIAN ASSISTANT

## 2024-09-02 PROCEDURE — 96374 THER/PROPH/DIAG INJ IV PUSH: CPT

## 2024-09-02 PROCEDURE — 25810000003 LACTATED RINGERS SOLUTION: Performed by: PHYSICIAN ASSISTANT

## 2024-09-02 PROCEDURE — 99283 EMERGENCY DEPT VISIT LOW MDM: CPT

## 2024-09-02 RX ORDER — KETOROLAC TROMETHAMINE 15 MG/ML
15 INJECTION, SOLUTION INTRAMUSCULAR; INTRAVENOUS ONCE
Status: COMPLETED | OUTPATIENT
Start: 2024-09-02 | End: 2024-09-02

## 2024-09-02 RX ORDER — ONDANSETRON 4 MG/1
4 TABLET, ORALLY DISINTEGRATING ORAL 4 TIMES DAILY PRN
Qty: 15 TABLET | Refills: 0 | Status: SHIPPED | OUTPATIENT
Start: 2024-09-02

## 2024-09-02 RX ORDER — FAMOTIDINE 10 MG/ML
20 INJECTION, SOLUTION INTRAVENOUS ONCE
Status: COMPLETED | OUTPATIENT
Start: 2024-09-02 | End: 2024-09-02

## 2024-09-02 RX ORDER — ONDANSETRON 2 MG/ML
4 INJECTION INTRAMUSCULAR; INTRAVENOUS ONCE
Status: COMPLETED | OUTPATIENT
Start: 2024-09-02 | End: 2024-09-02

## 2024-09-02 RX ADMIN — KETOROLAC TROMETHAMINE 15 MG: 15 INJECTION, SOLUTION INTRAMUSCULAR; INTRAVENOUS at 08:10

## 2024-09-02 RX ADMIN — SODIUM CHLORIDE, POTASSIUM CHLORIDE, SODIUM LACTATE AND CALCIUM CHLORIDE 1000 ML: 600; 310; 30; 20 INJECTION, SOLUTION INTRAVENOUS at 08:08

## 2024-09-02 RX ADMIN — ONDANSETRON 4 MG: 2 INJECTION, SOLUTION INTRAMUSCULAR; INTRAVENOUS at 08:10

## 2024-09-02 RX ADMIN — FAMOTIDINE 20 MG: 10 INJECTION INTRAVENOUS at 08:09

## 2024-09-02 NOTE — ED PROVIDER NOTES
MD ATTESTATION NOTE    The VINCE and I have discussed this patient's history, physical exam, and treatment plan.  I have reviewed the documentation and personally had a face to face interaction with the patient. I affirm the documentation and agree with the treatment and plan.  The attached note describes my personal findings.      I provided a substantive portion of the care of the patient.  I personally performed the physical exam in its entirety, and below are my findings.        Brief HPI: This patient is a 19-year-old female with a reported previous history of endometriosis presenting to the emergency room today with abdominal discomfort, headache, as well as nausea and vomiting.  She reports that she recently had a COVID exposure prior to the onset of symptoms.  She denies cough, shortness of breath, or fever/chills.      PHYSICAL EXAM  ED Triage Vitals   Temp Heart Rate Resp BP SpO2   09/02/24 0733 09/02/24 0733 09/02/24 0733 09/02/24 0745 09/02/24 0733   97.1 °F (36.2 °C) 103 18 125/80 98 %      Temp src Heart Rate Source Patient Position BP Location FiO2 (%)   -- 09/02/24 0745 09/02/24 0745 -- --    Monitor Lying           GENERAL: Resting comfortably and in no acute distress, nontoxic in appearance  HENT: nares patent  EYES: no scleral icterus  CV: regular rhythm, normal rate, no M/R/G  RESPIRATORY: normal effort, lungs clear bilaterally  ABDOMEN: soft, nontender, no rebound or guarding, no edema  MUSCULOSKELETAL: no deformity  NEURO: alert, moves all extremities, follows commands  PSYCH:  calm, cooperative  SKIN: warm, dry    Vital signs and nursing notes reviewed.      Differential diagnosis includes but is not limited to COVID-19, viral GI illness, gastroenteritis, urinary tract infection, or dehydration.      Plan: We will treat the patient's discomfort and nausea as we provide IV hydration.  We will obtain labs in the ED and reassess following results.      Lab values independently interpreted by myself  with my interpretation showing a normal CBC as well as CMP results.       Adrian Olvera MD  09/02/24 3983

## 2024-09-02 NOTE — ED PROVIDER NOTES
EMERGENCY DEPARTMENT ENCOUNTER  Room Number:  01/01  PCP: Beba Gaytan MD  Independent Historians: Patient      HPI:  Chief Complaint: had concerns including Abdominal Pain.     A complete HPI/ROS/PMH/PSH/SH/FH are unobtainable due to: None    Chronic or social conditions impacting patient care (Social Determinants of Health): None      Context: Dariel Schilling is a 19 y.o. female with a medical history of endometriosis, anemia, anxiety, and marijuana use who presents to the ED c/o acute headache and vomiting.  Patient reports symptoms started after waking up today.  She has vomited once.  Reports she does have some nasal congestion.  Reports she was recently informed her children were exposed to COVID although she states that he tested negative.  No reported fever, cough, shortness of breath.  Patient has no other systemic complaints at this time.      Review of prior external notes (non-ED) -and- Review of prior external test results outside of this encounter:  Patient seen in office by PCP on 7/9/2024 for annual exam.  Reviewed assessment and plan.  Patient will follow-up in 3 months.  Reviewed labs collected on 6/25/2024.  CBC with hemoglobin 12.4, CMP with creatinine 0.65.    Prescription drug monitoring program review:     N/A    PAST MEDICAL HISTORY  Active Ambulatory Problems     Diagnosis Date Noted    Left-sided weakness 10/04/2023    Routine health maintenance 05/08/2024    Chronic abdominal pain 05/29/2024    Seasonal allergies 05/29/2024    Nausea and vomiting 05/29/2024    Chronic chest pain 05/29/2024    Endometriosis 05/29/2024    Recurrent infections 07/09/2024    Marijuana use 07/09/2024    Asthma 07/09/2024     Resolved Ambulatory Problems     Diagnosis Date Noted    Chronic cough 05/29/2024     Past Medical History:   Diagnosis Date    Allergic     Anemia     Anxiety     Chlamydia     Eczema     Visual impairment          PAST SURGICAL HISTORY  Past Surgical History:   Procedure  Laterality Date    COLONOSCOPY           FAMILY HISTORY  Family History   Problem Relation Age of Onset    Breast cancer Maternal Great-Grandmother     Asthma Mother     Hyperlipidemia Mother     Hyperlipidemia Paternal Grandfather     Hyperlipidemia Paternal Grandmother     Vision loss Paternal Grandmother          SOCIAL HISTORY  Social History     Socioeconomic History    Marital status: Single   Tobacco Use    Smoking status: Never    Smokeless tobacco: Never   Vaping Use    Vaping status: Never Used   Substance and Sexual Activity    Alcohol use: Never    Drug use: Never     Types: Marijuana    Sexual activity: Yes     Partners: Female     Birth control/protection: Condom, Patch         ALLERGIES  Pomegranate [punica]      REVIEW OF SYSTEMS  Review of Systems   Constitutional:  Negative for chills and fever.   HENT:  Positive for congestion. Negative for ear pain and sore throat.    Respiratory:  Negative for cough and shortness of breath.    Cardiovascular:  Negative for chest pain and palpitations.   Gastrointestinal:  Positive for nausea and vomiting. Negative for abdominal pain.   Genitourinary:  Negative for dysuria and hematuria.   Musculoskeletal:  Negative for arthralgias and joint swelling.   Skin:  Negative for pallor and rash.   Neurological:  Positive for headaches. Negative for numbness.   Psychiatric/Behavioral:  Negative for confusion and hallucinations.      Included in HPI  All systems reviewed and negative except for those discussed in HPI.      PHYSICAL EXAM    I have reviewed the triage vital signs and nursing notes.    ED Triage Vitals [09/02/24 0733]   Temp Heart Rate Resp BP SpO2   97.1 °F (36.2 °C) 103 18 -- 98 %      Temp src Heart Rate Source Patient Position BP Location FiO2 (%)   -- -- -- -- --       Physical Exam  Constitutional:       General: She is not in acute distress.     Appearance: She is well-developed.   HENT:      Head: Normocephalic and atraumatic.   Eyes:       Extraocular Movements: Extraocular movements intact.   Cardiovascular:      Rate and Rhythm: Normal rate and regular rhythm.      Heart sounds: Normal heart sounds.   Pulmonary:      Effort: Pulmonary effort is normal.      Breath sounds: Normal breath sounds.   Abdominal:      General: There is no distension.      Tenderness: There is generalized abdominal tenderness. There is no guarding.   Skin:     General: Skin is warm.   Neurological:      General: No focal deficit present.      Mental Status: She is alert and oriented to person, place, and time.   Psychiatric:         Mood and Affect: Mood normal.           LAB RESULTS  Recent Results (from the past 24 hour(s))   COVID-19 and FLU A/B PCR, 1 HR TAT - Swab, Nasopharynx    Collection Time: 09/02/24  8:11 AM    Specimen: Nasopharynx; Swab   Result Value Ref Range    COVID19 Not Detected Not Detected - Ref. Range    Influenza A PCR Not Detected Not Detected    Influenza B PCR Not Detected Not Detected   Comprehensive Metabolic Panel    Collection Time: 09/02/24  8:11 AM    Specimen: Blood   Result Value Ref Range    Glucose 95 65 - 99 mg/dL    BUN 7 6 - 20 mg/dL    Creatinine 0.61 0.57 - 1.00 mg/dL    Sodium 139 136 - 145 mmol/L    Potassium 3.7 3.5 - 5.2 mmol/L    Chloride 103 98 - 107 mmol/L    CO2 22.7 22.0 - 29.0 mmol/L    Calcium 8.9 8.6 - 10.5 mg/dL    Total Protein 6.9 6.0 - 8.5 g/dL    Albumin 4.1 3.5 - 5.2 g/dL    ALT (SGPT) 8 1 - 33 U/L    AST (SGOT) 13 1 - 32 U/L    Alkaline Phosphatase 61 39 - 117 U/L    Total Bilirubin 0.2 0.0 - 1.2 mg/dL    Globulin 2.8 gm/dL    A/G Ratio 1.5 g/dL    BUN/Creatinine Ratio 11.5 7.0 - 25.0    Anion Gap 13.3 5.0 - 15.0 mmol/L    eGFR 131.4 >60.0 mL/min/1.73   hCG, Serum, Qualitative    Collection Time: 09/02/24  8:11 AM    Specimen: Blood   Result Value Ref Range    HCG Qualitative Negative Negative   CBC Auto Differential    Collection Time: 09/02/24  8:11 AM    Specimen: Blood   Result Value Ref Range    WBC 4.43 3.40  - 10.80 10*3/mm3    RBC 4.57 3.77 - 5.28 10*6/mm3    Hemoglobin 12.3 12.0 - 15.9 g/dL    Hematocrit 38.7 34.0 - 46.6 %    MCV 84.7 79.0 - 97.0 fL    MCH 26.9 26.6 - 33.0 pg    MCHC 31.8 31.5 - 35.7 g/dL    RDW 12.8 12.3 - 15.4 %    RDW-SD 39.4 37.0 - 54.0 fl    MPV 9.9 6.0 - 12.0 fL    Platelets 323 140 - 450 10*3/mm3    Neutrophil % 49.7 42.7 - 76.0 %    Lymphocyte % 31.8 19.6 - 45.3 %    Monocyte % 6.3 5.0 - 12.0 %    Eosinophil % 11.5 (H) 0.3 - 6.2 %    Basophil % 0.5 0.0 - 1.5 %    Immature Grans % 0.2 0.0 - 0.5 %    Neutrophils, Absolute 2.20 1.70 - 7.00 10*3/mm3    Lymphocytes, Absolute 1.41 0.70 - 3.10 10*3/mm3    Monocytes, Absolute 0.28 0.10 - 0.90 10*3/mm3    Eosinophils, Absolute 0.51 (H) 0.00 - 0.40 10*3/mm3    Basophils, Absolute 0.02 0.00 - 0.20 10*3/mm3    Immature Grans, Absolute 0.01 0.00 - 0.05 10*3/mm3    nRBC 0.0 0.0 - 0.2 /100 WBC   Urinalysis With Microscopic If Indicated (No Culture) - Urine, Clean Catch    Collection Time: 09/02/24  9:16 AM    Specimen: Urine, Clean Catch   Result Value Ref Range    Color, UA Yellow Yellow, Straw    Appearance, UA Clear Clear    pH, UA 7.5 5.0 - 8.0    Specific Gravity, UA 1.006 1.005 - 1.030    Glucose, UA Negative Negative    Ketones, UA Negative Negative    Bilirubin, UA Negative Negative    Blood, UA Negative Negative    Protein, UA Negative Negative    Leuk Esterase, UA Trace (A) Negative    Nitrite, UA Negative Negative    Urobilinogen, UA 0.2 E.U./dL 0.2 - 1.0 E.U./dL   Urinalysis, Microscopic Only - Urine, Clean Catch    Collection Time: 09/02/24  9:16 AM    Specimen: Urine, Clean Catch   Result Value Ref Range    RBC, UA 0-2 None Seen, 0-2 /HPF    WBC, UA 3-5 (A) None Seen, 0-2 /HPF    Bacteria, UA None Seen None Seen /HPF    Squamous Epithelial Cells, UA 3-6 (A) None Seen, 0-2 /HPF    Hyaline Casts, UA None Seen None Seen /LPF    Methodology Automated Microscopy          MEDICATIONS GIVEN IN ER  Medications   lactated ringers bolus 1,000 mL (1,000  mL Intravenous New Bag 9/2/24 0808)   famotidine (PEPCID) injection 20 mg (20 mg Intravenous Given 9/2/24 0809)   ondansetron (ZOFRAN) injection 4 mg (4 mg Intravenous Given 9/2/24 0810)   ketorolac (TORADOL) injection 15 mg (15 mg Intravenous Given 9/2/24 0810)         ORDERS PLACED DURING THIS VISIT:  Orders Placed This Encounter   Procedures    COVID-19 and FLU A/B PCR, 1 HR TAT - Swab, Nasopharynx    Comprehensive Metabolic Panel    hCG, Serum, Qualitative    Urinalysis With Microscopic If Indicated (No Culture) - Urine, Clean Catch    CBC Auto Differential    Urinalysis, Microscopic Only - Urine, Clean Catch    CBC & Differential         OUTPATIENT MEDICATION MANAGEMENT:  No current Epic-ordered facility-administered medications on file.     Current Outpatient Medications Ordered in Epic   Medication Sig Dispense Refill    albuterol sulfate  (90 Base) MCG/ACT inhaler 1 to 2 puffs every 4 to 6 hours for cough, shortness of breath, and wheezing      azelastine (ASTELIN) 0.1 % nasal spray 2 sprays into the nostril(s) as directed by provider 2 (Two) Times a Day. 30 mL 6    Banophen 25 MG capsule Take 1 capsule by mouth 2 (Two) Times a Day.      cetirizine (zyrTEC) 5 MG tablet Take 1 tablet by mouth Every Morning.      Denta 5000 Plus 1.1 % cream BRUSH WITH EACH NIGHT. EXPECTORATE (SPIT) DO NOT RINSE      montelukast (Singulair) 10 MG tablet Take 1 tablet by mouth Every Night. 30 tablet 3    norelgestromin-ethinyl estradiol (ORTHO EVRA) 150-35 MCG/24HR Place 1 patch on the skin as directed by provider 1 (One) Time Per Week for 90 days. 12 patch 3    ondansetron ODT (ZOFRAN-ODT) 4 MG disintegrating tablet Take 1 tablet by mouth 4 (Four) Times a Day As Needed for Nausea or Vomiting. 15 tablet 0    Scopolamine 1 MG/3DAYS patch Place 1 patch on the skin as directed by provider Every 72 (Seventy-Two) Hours. 30 each 0         PROGRESS, DATA ANALYSIS, CONSULTS, AND MEDICAL DECISION MAKING  All labs have been  independently interpreted by me.  All radiology studies have been reviewed by me. All EKG's have been independently viewed and interpreted by me.  Discussion below represents my analysis of pertinent findings related to patient's condition, differential diagnosis, treatment plan and final disposition.    Differential diagnosis includes but is not limited to COVID infection, dehydration, cannabinoid hyperemesis.        ED Course as of 09/02/24 1026   Mon Sep 02, 2024   0828 WBC: 4.43 [MP]   0828 Hemoglobin: 12.3 [MP]   1022 I reassessed the patient.  She reports she is feeling better.  Discussed workup findings and negative COVID test.  Will have patient follow-up with primary care provider.  Will discharge with Zofran for symptoms at home.  Discussed ED return precautions.  She is otherwise well-appearing, hemodynamically stable, and therefore appropriate for discharge. [MP]      ED Course User Index  [MP] Jenny Greer PA-C             AS OF 10:26 EDT VITALS:    BP - 116/73  HR - 82  TEMP - 97.1 °F (36.2 °C)  O2 SATS - 90%    COMPLEXITY OF CARE  Admission was considered but after careful review of the patient's presentation, physical examination, diagnostic results, and response to treatment the patient may be safely discharged with outpatient follow-up.      DIAGNOSIS  Final diagnoses:   Generalized abdominal pain   Viral illness         DISPOSITION  ED Disposition       ED Disposition   Discharge    Condition   Stable    Comment   --                Please note that portions of this document were completed with a voice recognition program.    Note Disclaimer: At [facility], we believe that sharing information builds trust and better relationships. You are receiving this note because you recently visited [facility]. It is possible you will see health information before a provider has talked with you about it. This kind of information can be easy to misunderstand. To help you fully understand what it  means for your health, we urge you to discuss this note with your provider.         Jenny Greer PA-C  09/02/24 1024

## 2024-09-02 NOTE — DISCHARGE INSTRUCTIONS
Follow-up with primary care provider.  You Zofran as needed for nausea and vomiting.  Use Tylenol or ibuprofen to help with pain.  Return to emergency department for any worsening symptoms.

## 2024-09-02 NOTE — ED NOTES
Pt to ED from home via pv. Pt c/o abd pain, headache and vomiting starting this am. Pt recently cared for child who had been covid+.

## 2024-09-27 ENCOUNTER — HOSPITAL ENCOUNTER (EMERGENCY)
Facility: HOSPITAL | Age: 19
Discharge: HOME | End: 2024-09-27
Attending: EMERGENCY MEDICINE

## 2024-09-27 ENCOUNTER — APPOINTMENT (OUTPATIENT)
Dept: GENERAL RADIOLOGY | Facility: HOSPITAL | Age: 19
End: 2024-09-27

## 2024-09-27 ENCOUNTER — APPOINTMENT (OUTPATIENT)
Dept: CT IMAGING | Facility: HOSPITAL | Age: 19
End: 2024-09-27

## 2024-09-27 VITALS
HEIGHT: 65 IN | DIASTOLIC BLOOD PRESSURE: 91 MMHG | OXYGEN SATURATION: 100 % | SYSTOLIC BLOOD PRESSURE: 128 MMHG | HEART RATE: 82 BPM | RESPIRATION RATE: 16 BRPM | BODY MASS INDEX: 21.99 KG/M2 | WEIGHT: 132 LBS | TEMPERATURE: 97.1 F

## 2024-09-27 DIAGNOSIS — D64.9 ANEMIA, UNSPECIFIED TYPE: ICD-10-CM

## 2024-09-27 DIAGNOSIS — N83.201 RIGHT OVARIAN CYST: ICD-10-CM

## 2024-09-27 DIAGNOSIS — R10.84 GENERALIZED ABDOMINAL PAIN: Primary | ICD-10-CM

## 2024-09-27 DIAGNOSIS — R07.89 CHEST TIGHTNESS: ICD-10-CM

## 2024-09-27 LAB
ALBUMIN SERPL-MCNC: 4 G/DL (ref 3.5–5.2)
ALBUMIN/GLOB SERPL: 1.6 G/DL
ALP SERPL-CCNC: 57 U/L (ref 39–117)
ALT SERPL W P-5'-P-CCNC: 6 U/L (ref 1–33)
ANION GAP SERPL CALCULATED.3IONS-SCNC: 8.6 MMOL/L (ref 5–15)
AST SERPL-CCNC: 13 U/L (ref 1–32)
BACTERIA UR QL AUTO: ABNORMAL /HPF
BASOPHILS # BLD AUTO: 0.04 10*3/MM3 (ref 0–0.2)
BASOPHILS NFR BLD AUTO: 0.8 % (ref 0–1.5)
BILIRUB SERPL-MCNC: 0.3 MG/DL (ref 0–1.2)
BILIRUB UR QL STRIP: NEGATIVE
BUN SERPL-MCNC: 6 MG/DL (ref 6–20)
BUN/CREAT SERPL: 9.1 (ref 7–25)
CALCIUM SPEC-SCNC: 9.1 MG/DL (ref 8.6–10.5)
CHLORIDE SERPL-SCNC: 105 MMOL/L (ref 98–107)
CLARITY UR: CLEAR
CO2 SERPL-SCNC: 24.4 MMOL/L (ref 22–29)
COLOR UR: YELLOW
CREAT SERPL-MCNC: 0.66 MG/DL (ref 0.57–1)
DEPRECATED RDW RBC AUTO: 41.6 FL (ref 37–54)
EGFRCR SERPLBLD CKD-EPI 2021: 129 ML/MIN/1.73
EOSINOPHIL # BLD AUTO: 0.58 10*3/MM3 (ref 0–0.4)
EOSINOPHIL NFR BLD AUTO: 11.8 % (ref 0.3–6.2)
ERYTHROCYTE [DISTWIDTH] IN BLOOD BY AUTOMATED COUNT: 13.5 % (ref 12.3–15.4)
GLOBULIN UR ELPH-MCNC: 2.5 GM/DL
GLUCOSE SERPL-MCNC: 100 MG/DL (ref 65–99)
GLUCOSE UR STRIP-MCNC: NEGATIVE MG/DL
HCG SERPL QL: NEGATIVE
HCT VFR BLD AUTO: 35.4 % (ref 34–46.6)
HGB BLD-MCNC: 11.3 G/DL (ref 12–15.9)
HGB UR QL STRIP.AUTO: NEGATIVE
HOLD SPECIMEN: NORMAL
HYALINE CASTS UR QL AUTO: ABNORMAL /LPF
IMM GRANULOCYTES # BLD AUTO: 0.01 10*3/MM3 (ref 0–0.05)
IMM GRANULOCYTES NFR BLD AUTO: 0.2 % (ref 0–0.5)
KETONES UR QL STRIP: NEGATIVE
LEUKOCYTE ESTERASE UR QL STRIP.AUTO: ABNORMAL
LIPASE SERPL-CCNC: 21 U/L (ref 13–60)
LYMPHOCYTES # BLD AUTO: 1.87 10*3/MM3 (ref 0.7–3.1)
LYMPHOCYTES NFR BLD AUTO: 38 % (ref 19.6–45.3)
MCH RBC QN AUTO: 27 PG (ref 26.6–33)
MCHC RBC AUTO-ENTMCNC: 31.9 G/DL (ref 31.5–35.7)
MCV RBC AUTO: 84.7 FL (ref 79–97)
MONOCYTES # BLD AUTO: 0.33 10*3/MM3 (ref 0.1–0.9)
MONOCYTES NFR BLD AUTO: 6.7 % (ref 5–12)
NEUTROPHILS NFR BLD AUTO: 2.09 10*3/MM3 (ref 1.7–7)
NEUTROPHILS NFR BLD AUTO: 42.5 % (ref 42.7–76)
NITRITE UR QL STRIP: NEGATIVE
NRBC BLD AUTO-RTO: 0 /100 WBC (ref 0–0.2)
PH UR STRIP.AUTO: 8.5 [PH] (ref 5–8)
PLATELET # BLD AUTO: 305 10*3/MM3 (ref 140–450)
PMV BLD AUTO: 9.8 FL (ref 6–12)
POTASSIUM SERPL-SCNC: 3.9 MMOL/L (ref 3.5–5.2)
PROT SERPL-MCNC: 6.5 G/DL (ref 6–8.5)
PROT UR QL STRIP: NEGATIVE
QT INTERVAL: 390 MS
QTC INTERVAL: 412 MS
RBC # BLD AUTO: 4.18 10*6/MM3 (ref 3.77–5.28)
RBC # UR STRIP: ABNORMAL /HPF
REF LAB TEST METHOD: ABNORMAL
SODIUM SERPL-SCNC: 138 MMOL/L (ref 136–145)
SP GR UR STRIP: 1.01 (ref 1–1.03)
SQUAMOUS #/AREA URNS HPF: ABNORMAL /HPF
TROPONIN T SERPL HS-MCNC: 9 NG/L
UROBILINOGEN UR QL STRIP: ABNORMAL
WBC # UR STRIP: ABNORMAL /HPF
WBC NRBC COR # BLD AUTO: 4.92 10*3/MM3 (ref 3.4–10.8)
WHOLE BLOOD HOLD COAG: NORMAL
WHOLE BLOOD HOLD SPECIMEN: NORMAL

## 2024-09-27 PROCEDURE — 25510000001 IOPAMIDOL 61 % SOLUTION: Performed by: EMERGENCY MEDICINE

## 2024-09-27 PROCEDURE — 81001 URINALYSIS AUTO W/SCOPE: CPT | Performed by: EMERGENCY MEDICINE

## 2024-09-27 PROCEDURE — 74177 CT ABD & PELVIS W/CONTRAST: CPT

## 2024-09-27 PROCEDURE — 93005 ELECTROCARDIOGRAM TRACING: CPT | Performed by: EMERGENCY MEDICINE

## 2024-09-27 PROCEDURE — 71045 X-RAY EXAM CHEST 1 VIEW: CPT

## 2024-09-27 PROCEDURE — 84703 CHORIONIC GONADOTROPIN ASSAY: CPT | Performed by: EMERGENCY MEDICINE

## 2024-09-27 PROCEDURE — 80053 COMPREHEN METABOLIC PANEL: CPT | Performed by: EMERGENCY MEDICINE

## 2024-09-27 PROCEDURE — 84484 ASSAY OF TROPONIN QUANT: CPT | Performed by: EMERGENCY MEDICINE

## 2024-09-27 PROCEDURE — 25810000003 LACTATED RINGERS SOLUTION: Performed by: PHYSICIAN ASSISTANT

## 2024-09-27 PROCEDURE — 96375 TX/PRO/DX INJ NEW DRUG ADDON: CPT

## 2024-09-27 PROCEDURE — 85025 COMPLETE CBC W/AUTO DIFF WBC: CPT | Performed by: EMERGENCY MEDICINE

## 2024-09-27 PROCEDURE — 25010000002 MORPHINE PER 10 MG: Performed by: EMERGENCY MEDICINE

## 2024-09-27 PROCEDURE — 25010000002 ONDANSETRON PER 1 MG: Performed by: PHYSICIAN ASSISTANT

## 2024-09-27 PROCEDURE — 83690 ASSAY OF LIPASE: CPT | Performed by: EMERGENCY MEDICINE

## 2024-09-27 PROCEDURE — 96374 THER/PROPH/DIAG INJ IV PUSH: CPT

## 2024-09-27 PROCEDURE — 93010 ELECTROCARDIOGRAM REPORT: CPT | Performed by: INTERNAL MEDICINE

## 2024-09-27 PROCEDURE — 99285 EMERGENCY DEPT VISIT HI MDM: CPT

## 2024-09-27 RX ORDER — MORPHINE SULFATE 2 MG/ML
2 INJECTION, SOLUTION INTRAMUSCULAR; INTRAVENOUS ONCE
Status: COMPLETED | OUTPATIENT
Start: 2024-09-27 | End: 2024-09-27

## 2024-09-27 RX ORDER — IOPAMIDOL 612 MG/ML
100 INJECTION, SOLUTION INTRAVASCULAR
Status: COMPLETED | OUTPATIENT
Start: 2024-09-27 | End: 2024-09-27

## 2024-09-27 RX ORDER — CAPSAICIN 0.75 MG/G
1 CREAM TOPICAL ONCE
Status: COMPLETED | OUTPATIENT
Start: 2024-09-27 | End: 2024-09-27

## 2024-09-27 RX ORDER — ONDANSETRON 2 MG/ML
4 INJECTION INTRAMUSCULAR; INTRAVENOUS ONCE
Status: COMPLETED | OUTPATIENT
Start: 2024-09-27 | End: 2024-09-27

## 2024-09-27 RX ORDER — HYDROXYZINE HYDROCHLORIDE 25 MG/1
25 TABLET, FILM COATED ORAL ONCE
Status: COMPLETED | OUTPATIENT
Start: 2024-09-27 | End: 2024-09-27

## 2024-09-27 RX ORDER — SODIUM CHLORIDE 0.9 % (FLUSH) 0.9 %
10 SYRINGE (ML) INJECTION AS NEEDED
Status: DISCONTINUED | OUTPATIENT
Start: 2024-09-27 | End: 2024-09-27 | Stop reason: HOSPADM

## 2024-09-27 RX ORDER — FAMOTIDINE 10 MG/ML
20 INJECTION, SOLUTION INTRAVENOUS ONCE
Status: COMPLETED | OUTPATIENT
Start: 2024-09-27 | End: 2024-09-27

## 2024-09-27 RX ADMIN — MORPHINE SULFATE 2 MG: 2 INJECTION, SOLUTION INTRAMUSCULAR; INTRAVENOUS at 06:21

## 2024-09-27 RX ADMIN — FAMOTIDINE 20 MG: 10 INJECTION INTRAVENOUS at 06:22

## 2024-09-27 RX ADMIN — IOPAMIDOL 85 ML: 612 INJECTION, SOLUTION INTRAVENOUS at 07:21

## 2024-09-27 RX ADMIN — ONDANSETRON 4 MG: 2 INJECTION, SOLUTION INTRAMUSCULAR; INTRAVENOUS at 06:22

## 2024-09-27 RX ADMIN — HYDROXYZINE HYDROCHLORIDE 25 MG: 25 TABLET ORAL at 08:28

## 2024-09-27 RX ADMIN — SODIUM CHLORIDE, POTASSIUM CHLORIDE, SODIUM LACTATE AND CALCIUM CHLORIDE 1000 ML: 600; 310; 30; 20 INJECTION, SOLUTION INTRAVENOUS at 06:25

## 2024-09-27 RX ADMIN — CAPSAICIN 1 APPLICATION: 0.75 CREAM TOPICAL at 07:48

## 2024-09-27 NOTE — ED PROVIDER NOTES
EMERGENCY DEPARTMENT ENCOUNTER  Room Number:  06/06  PCP: System, Provider Not In  Independent Historians: Patient      HPI:  Chief Complaint: had concerns including Abdominal Pain.     A complete HPI/ROS/PMH/PSH/SH/FH are unobtainable due to: None    Chronic or social conditions impacting patient care (Social Determinants of Health): None      Context: Dariel Schilling is a 19 y.o. female with a medical history of endometriosis, anxiety, asthma, marijuana use, and recurrent abdominal pain who presents to the ED c/o acute abdominal pain.  Patient reports she developed right-sided abdominal pain approximately 2 hours ago.  Reports pain woke her up from sleep.  The pain radiates across the left side of her abdomen.  Reports the pain is making her heart race.  Has had 2 episodes of vomiting.  No injury or trauma to the abdomen.  No prior abdominal surgeries.  LMP 9/20/2024.  Patient has no other systemic complaints at this time.      Review of prior external notes (non-ED) -and- Review of prior external test results outside of this encounter:  Patient seen in office by PCP on 7/9/2024 for annual physical exam.  Reviewed assessment and plan.  Patient encouraged to reduce marijuana use.  Reviewed labs collected on 9/2/2024.  CBC with hemoglobin 12.3, CMP with creatinine 0.61.    Prescription drug monitoring program review:     N/A    PAST MEDICAL HISTORY  Active Ambulatory Problems     Diagnosis Date Noted    Left-sided weakness 10/04/2023    Routine health maintenance 05/08/2024    Chronic abdominal pain 05/29/2024    Seasonal allergies 05/29/2024    Nausea and vomiting 05/29/2024    Chronic chest pain 05/29/2024    Endometriosis 05/29/2024    Recurrent infections 07/09/2024    Marijuana use 07/09/2024    Asthma 07/09/2024     Resolved Ambulatory Problems     Diagnosis Date Noted    Chronic cough 05/29/2024     Past Medical History:   Diagnosis Date    Allergic     Anemia     Anxiety     Chlamydia     Eczema      Visual impairment          PAST SURGICAL HISTORY  Past Surgical History:   Procedure Laterality Date    COLONOSCOPY           FAMILY HISTORY  Family History   Problem Relation Age of Onset    Breast cancer Maternal Great-Grandmother     Asthma Mother     Hyperlipidemia Mother     Hyperlipidemia Paternal Grandfather     Hyperlipidemia Paternal Grandmother     Vision loss Paternal Grandmother          SOCIAL HISTORY  Social History     Socioeconomic History    Marital status: Single   Tobacco Use    Smoking status: Never    Smokeless tobacco: Never   Vaping Use    Vaping status: Never Used   Substance and Sexual Activity    Alcohol use: Never    Drug use: Never     Types: Marijuana    Sexual activity: Yes     Partners: Female     Birth control/protection: Condom, Patch         ALLERGIES  Pomegranate [punica]      REVIEW OF SYSTEMS  Review of Systems   Constitutional:  Negative for chills and fever.   HENT:  Negative for ear pain and sore throat.    Respiratory:  Negative for cough and shortness of breath.    Cardiovascular:  Negative for chest pain and palpitations.   Gastrointestinal:  Positive for abdominal pain, nausea and vomiting.   Genitourinary:  Negative for dysuria and hematuria.   Musculoskeletal:  Negative for arthralgias and joint swelling.   Skin:  Negative for pallor and rash.   Neurological:  Negative for numbness and headaches.   Psychiatric/Behavioral:  Negative for confusion and hallucinations.      Included in HPI  All systems reviewed and negative except for those discussed in HPI.      PHYSICAL EXAM    I have reviewed the triage vital signs and nursing notes.    ED Triage Vitals   Temp Heart Rate Resp BP SpO2   09/27/24 0532 09/27/24 0532 09/27/24 0532 09/27/24 0537 09/27/24 0532   97.1 °F (36.2 °C) 87 18 108/65 100 %      Temp src Heart Rate Source Patient Position BP Location FiO2 (%)   09/27/24 0532 09/27/24 0532 -- -- --   Tympanic Monitor          Physical Exam  Constitutional:       General:  She is not in acute distress.     Appearance: She is well-developed.   HENT:      Head: Normocephalic and atraumatic.   Eyes:      Extraocular Movements: Extraocular movements intact.   Cardiovascular:      Rate and Rhythm: Normal rate and regular rhythm.      Heart sounds: Normal heart sounds.   Pulmonary:      Effort: Pulmonary effort is normal.      Breath sounds: Normal breath sounds.   Abdominal:      General: There is no distension.      Tenderness: There is generalized abdominal tenderness.   Skin:     General: Skin is warm.   Neurological:      General: No focal deficit present.      Mental Status: She is alert and oriented to person, place, and time.   Psychiatric:         Mood and Affect: Mood normal.           LAB RESULTS  Recent Results (from the past 24 hour(s))   Comprehensive Metabolic Panel    Collection Time: 09/27/24  5:58 AM    Specimen: Blood   Result Value Ref Range    Glucose 100 (H) 65 - 99 mg/dL    BUN 6 6 - 20 mg/dL    Creatinine 0.66 0.57 - 1.00 mg/dL    Sodium 138 136 - 145 mmol/L    Potassium 3.9 3.5 - 5.2 mmol/L    Chloride 105 98 - 107 mmol/L    CO2 24.4 22.0 - 29.0 mmol/L    Calcium 9.1 8.6 - 10.5 mg/dL    Total Protein 6.5 6.0 - 8.5 g/dL    Albumin 4.0 3.5 - 5.2 g/dL    ALT (SGPT) 6 1 - 33 U/L    AST (SGOT) 13 1 - 32 U/L    Alkaline Phosphatase 57 39 - 117 U/L    Total Bilirubin 0.3 0.0 - 1.2 mg/dL    Globulin 2.5 gm/dL    A/G Ratio 1.6 g/dL    BUN/Creatinine Ratio 9.1 7.0 - 25.0    Anion Gap 8.6 5.0 - 15.0 mmol/L    eGFR 129.0 >60.0 mL/min/1.73   Lipase    Collection Time: 09/27/24  5:58 AM    Specimen: Blood   Result Value Ref Range    Lipase 21 13 - 60 U/L   hCG, Serum, Qualitative    Collection Time: 09/27/24  5:58 AM    Specimen: Blood   Result Value Ref Range    HCG Qualitative Negative Negative   Single High Sensitivity Troponin T    Collection Time: 09/27/24  5:58 AM    Specimen: Blood   Result Value Ref Range    HS Troponin T 9 <14 ng/L   Green Top (Gel)    Collection Time:  09/27/24  5:58 AM   Result Value Ref Range    Extra Tube Hold for add-ons.    Lavender Top    Collection Time: 09/27/24  5:58 AM   Result Value Ref Range    Extra Tube hold for add-on    Light Blue Top    Collection Time: 09/27/24  5:58 AM   Result Value Ref Range    Extra Tube Hold for add-ons.    CBC Auto Differential    Collection Time: 09/27/24  5:58 AM    Specimen: Blood   Result Value Ref Range    WBC 4.92 3.40 - 10.80 10*3/mm3    RBC 4.18 3.77 - 5.28 10*6/mm3    Hemoglobin 11.3 (L) 12.0 - 15.9 g/dL    Hematocrit 35.4 34.0 - 46.6 %    MCV 84.7 79.0 - 97.0 fL    MCH 27.0 26.6 - 33.0 pg    MCHC 31.9 31.5 - 35.7 g/dL    RDW 13.5 12.3 - 15.4 %    RDW-SD 41.6 37.0 - 54.0 fl    MPV 9.8 6.0 - 12.0 fL    Platelets 305 140 - 450 10*3/mm3    Neutrophil % 42.5 (L) 42.7 - 76.0 %    Lymphocyte % 38.0 19.6 - 45.3 %    Monocyte % 6.7 5.0 - 12.0 %    Eosinophil % 11.8 (H) 0.3 - 6.2 %    Basophil % 0.8 0.0 - 1.5 %    Immature Grans % 0.2 0.0 - 0.5 %    Neutrophils, Absolute 2.09 1.70 - 7.00 10*3/mm3    Lymphocytes, Absolute 1.87 0.70 - 3.10 10*3/mm3    Monocytes, Absolute 0.33 0.10 - 0.90 10*3/mm3    Eosinophils, Absolute 0.58 (H) 0.00 - 0.40 10*3/mm3    Basophils, Absolute 0.04 0.00 - 0.20 10*3/mm3    Immature Grans, Absolute 0.01 0.00 - 0.05 10*3/mm3    nRBC 0.0 0.0 - 0.2 /100 WBC   ECG 12 Lead Chest Pain    Collection Time: 09/27/24  6:13 AM   Result Value Ref Range    QT Interval 390 ms    QTC Interval 412 ms   Urinalysis With Microscopic If Indicated (No Culture) - Urine, Clean Catch    Collection Time: 09/27/24  6:21 AM    Specimen: Urine, Clean Catch   Result Value Ref Range    Color, UA Yellow Yellow, Straw    Appearance, UA Clear Clear    pH, UA 8.5 (H) 5.0 - 8.0    Specific Gravity, UA 1.009 1.005 - 1.030    Glucose, UA Negative Negative    Ketones, UA Negative Negative    Bilirubin, UA Negative Negative    Blood, UA Negative Negative    Protein, UA Negative Negative    Leuk Esterase, UA Small (1+) (A) Negative     Nitrite, UA Negative Negative    Urobilinogen, UA 1.0 E.U./dL 0.2 - 1.0 E.U./dL   Urinalysis, Microscopic Only - Urine, Clean Catch    Collection Time: 09/27/24  6:21 AM    Specimen: Urine, Clean Catch   Result Value Ref Range    RBC, UA 0-2 None Seen, 0-2 /HPF    WBC, UA 3-5 (A) None Seen, 0-2 /HPF    Bacteria, UA None Seen None Seen /HPF    Squamous Epithelial Cells, UA 3-6 (A) None Seen, 0-2 /HPF    Hyaline Casts, UA None Seen None Seen /LPF    Methodology Automated Microscopy          RADIOLOGY  CT Abdomen Pelvis With Contrast    Result Date: 9/27/2024  CT ABDOMEN AND PELVIS WITH IV CONTRAST  HISTORY: Diffuse abdominal pain., Endometriosis; white count, alkaline phosphatase, AST, ALT and bilirubin within normal limits  TECHNIQUE: Radiation dose reduction techniques were utilized, including automated exposure control and exposure modulation based on body size. 3 mm images were obtained through the abdomen and pelvis after the administration of IV contrast.  COMPARISON: CT abdomen pelvis 4/28/2024  FINDINGS:  No findings of small bowel obstruction. The appendix is air-filled throughout its course without significant surrounding fat stranding.  Subcentimeter hepatic lesions are too small to characterize. Gallbladder,, pancreas, spleen, adrenal glands and kidneys have an unremarkable postcontrast CT appearance. No hydronephrosis. However, please note portions of the distal ureters are difficult to follow in their entirety due to relative lack of free intraperitoneal fat. Bladder is underdistended and cannot be evaluated.  No abdominal pelvic adenopathy by size criteria. Hypodense right adnexal lesion measuring up to 2.4 cm is present. There is also small amount of free fluid in the pelvis. These findings are within normal limits for premenopausal patient. No a few foci of air encircle the lower uterus and follows the course of the previously seen vaginal ring on 8/21/2023. No suspicious lytic or blastic osseous  lesions.      1.  No CT findings of acute intraabdominal pathology. 2.  Other findings as above.   This report was finalized on 9/27/2024 7:49 AM by Dr. Cody Huff M.D on Workstation: [id number]      XR Chest 1 View    Result Date: 9/27/2024  XR CHEST 1 VW-  Clinical: Chest pain  COMPARISON examination 4/27/2024  FINDINGS: Heart size normal. No mediastinal or hilar abnormality. The lungs are clear.  CONCLUSION: Normal chest  This report was finalized on 9/27/2024 6:34 AM by Dr. Yonas Raphael M.D on Workstation: [id number]         MEDICATIONS GIVEN IN ER  Medications   sodium chloride 0.9 % flush 10 mL (has no administration in time range)   lactated ringers bolus 1,000 mL (0 mL Intravenous Stopped 9/27/24 0833)   famotidine (PEPCID) injection 20 mg (20 mg Intravenous Given 9/27/24 0622)   ondansetron (ZOFRAN) injection 4 mg (4 mg Intravenous Given 9/27/24 0622)   morphine injection 2 mg (2 mg Intravenous Given 9/27/24 0621)   capsaicin (ZOSTRIX) 0.075 % topical cream 1 Application (1 Application Topical Given 9/27/24 0748)   iopamidol (ISOVUE-300) 61 % injection 100 mL (85 mL Intravenous Given by Other 9/27/24 0721)   hydrOXYzine (ATARAX) tablet 25 mg (25 mg Oral Given 9/27/24 0828)         ORDERS PLACED DURING THIS VISIT:  Orders Placed This Encounter   Procedures    XR Chest 1 View    CT Abdomen Pelvis With Contrast    [place] Draw    Comprehensive Metabolic Panel    Lipase    Urinalysis With Microscopic If Indicated (No Culture) - Urine, Clean Catch    hCG, Serum, Qualitative    Single High Sensitivity Troponin T    CBC Auto Differential    Urinalysis, Microscopic Only - Urine, Clean Catch    ECG 12 Lead Chest Pain    Insert Peripheral IV    CBC & Differential    Green Top (Gel)    Lavender Top    Light Blue Top         OUTPATIENT MEDICATION MANAGEMENT:  Current Facility-Administered Medications Ordered in Epic   Medication Dose Route Frequency Provider Last Rate Last Admin    sodium chloride 0.9 % flush 10 mL   10 mL Intravenous Humberto Wiseman MD         Current Outpatient Medications Ordered in Epic   Medication Sig Dispense Refill    albuterol sulfate  (90 Base) MCG/ACT inhaler 1 to 2 puffs every 4 to 6 hours for cough, shortness of breath, and wheezing      azelastine (ASTELIN) 0.1 % nasal spray 2 sprays into the nostril(s) as directed by provider 2 (Two) Times a Day. 30 mL 6    Banophen 25 MG capsule Take 1 capsule by mouth 2 (Two) Times a Day.      cetirizine (zyrTEC) 5 MG tablet Take 1 tablet by mouth Every Morning.      Denta 5000 Plus 1.1 % cream BRUSH WITH EACH NIGHT. EXPECTORATE (SPIT) DO NOT RINSE      montelukast (Singulair) 10 MG tablet Take 1 tablet by mouth Every Night. 30 tablet 3    norelgestromin-ethinyl estradiol (ORTHO EVRA) 150-35 MCG/24HR Place 1 patch on the skin as directed by provider 1 (One) Time Per Week for 90 days. 12 patch 3    ondansetron ODT (ZOFRAN-ODT) 4 MG disintegrating tablet Take 1 tablet by mouth 4 (Four) Times a Day As Needed for Nausea or Vomiting. 15 tablet 0    Scopolamine 1 MG/3DAYS patch Place 1 patch on the skin as directed by provider Every 72 (Seventy-Two) Hours. 30 each 0         PROGRESS, DATA ANALYSIS, CONSULTS, AND MEDICAL DECISION MAKING  All labs have been independently interpreted by me.  All radiology studies have been reviewed by me. All EKG's have been independently viewed and interpreted by me.  Discussion below represents my analysis of pertinent findings related to patient's condition, differential diagnosis, treatment plan and final disposition.    Differential diagnosis includes but is not limited to cannabinoid hyperemesis, gastritis, cholecystitis, appendicitis.        ED Course as of 09/27/24 0847   Fri Sep 27, 2024   0619 WBC: 4.92 [MP]   0619 Hemoglobin(!): 11.3 [MP]   0636 BUN: 6 [MP]   0636 Creatinine: 0.66 [MP]   0636 Lipase: 21 [MP]   0636 HS Troponin T: 9 [MP]   0636 HCG Qualitative: Negative [MP]   0636 Chest x-ray independently  interpreted by me as no pneumothorax [MP]   0808 Will p.o. challenge the patient [MP]   0822 Reassessed the patient and updated her on workup findings.  She appears quite anxious.  Will order her dose of hydroxyzine [MP]   0846 Patient presents to emergency department with acute on chronic abdominal pain as well as chest tightness.  Worked up with labs, EKG, chest x-ray, CT abdomen and pelvis.  Noted to have right ovarian cyst but no other findings.  Pain improved after Pepcid, morphine, capsaicin cream although patient was still anxious.  Gave her dose of hydroxyzine and she has improved.  Will have her follow-up with PCP and gastroenterology.  Discussed ED return precautions.  She is otherwise well-appearing, hemodynamically stable, and therefore appropriate for discharge. [MP]      ED Course User Index  [MP] Jenny Greer PA-C             AS OF 08:47 EDT VITALS:    BP - 128/91  HR - 82  TEMP - 97.1 °F (36.2 °C) (Tympanic)  O2 SATS - 100%    COMPLEXITY OF CARE  Admission was considered but after careful review of the patient's presentation, physical examination, diagnostic results, and response to treatment the patient may be safely discharged with outpatient follow-up.      DIAGNOSIS  Final diagnoses:   Generalized abdominal pain   Chest tightness   Anemia, unspecified type   Right ovarian cyst         DISPOSITION  ED Disposition       ED Disposition   Discharge    Condition   Stable    Comment   --                Please note that portions of this document were completed with a voice recognition program.    Note Disclaimer: At [facility], we believe that sharing information builds trust and better relationships. You are receiving this note because you recently visited [facility]. It is possible you will see health information before a provider has talked with you about it. This kind of information can be easy to misunderstand. To help you fully understand what it means for your health, we urge you to  discuss this note with your provider.         Jenny Greer PA-C  09/27/24 0847

## 2024-09-27 NOTE — ED PROVIDER NOTES
MD ATTESTATION NOTE     SHARED VISIT: This visit was performed by BOTH a physician and an APC. The substantive portion of the medical decision making was performed by this attesting physician who made or approved the management plan and takes responsibility for patient management. All studies in the APC note (if performed) were independently interpreted by me.  The [facility] and I have discussed this patient's history, physical exam, and treatment plan. I have reviewed the documentation and personally had a face to face interaction with the patient. I affirm the documentation and agree with the treatment and plan. I provided a substantive portion of the care of the patient.  I personally performed the physical exam in its entirety, and below are my findings.      Brief HPI: Patient complains of acute right sided abdominal pain.  Pain began several hours ago.  She reports associated nausea and vomiting.  She had a similar episode several weeks ago.  Denies fever, chest pain, shortness of breath, flank pain, diarrhea, hematuria, or prior abdominal surgery.  LMP was 9/20.  She smokes marijuana regularly.    PHYSICAL EXAM    GENERAL: Awake, alert, oriented x 3.  Well-developed, well-nourished female.  Resting comfortably in no acute distress  HENT: nares patent, moist mucous membranes  EYES: no scleral icterus  CV: regular rhythm, normal rate  RESPIRATORY: normal effort, CTAB  ABDOMEN: soft, nontender, there is generalized abdominal tenderness without rebound or guarding, no CVA tenderness  MUSCULOSKELETAL: no deformity  NEURO: alert, moves all extremities, follows commands  PSYCH:  calm, cooperative  SKIN: warm, dry    Vital signs and nursing notes reviewed.        Plan: Patient complains of abdominal pain, nausea, and vomiting.  She does not have an acute abdomen.  She is afebrile.  Will obtain labs, chest x-ray, and CT abdomen/pelvis for further evaluation.  She will be given IV fluids and IV medications for pain and  nausea.    Chest x-ray personally interpreted by me.  My personal interpretation is: Heart size is normal.  Lungs are clear.  No pleural effusion.    Per the radiologist, CT abdomen/pelvis is negative acute    ED Course as of 09/27/24 0848   Fri Sep 27, 2024   0619 WBC: 4.92 [MP]   0619 Hemoglobin(!): 11.3 [MP]   0636 BUN: 6 [MP]   0636 Creatinine: 0.66 [MP]   0636 Lipase: 21 [MP]   0636 HS Troponin T: 9 [MP]   0636 HCG Qualitative: Negative [MP]   0636 Chest x-ray independently interpreted by me as no pneumothorax [MP]   0808 Will p.o. challenge the patient [MP]   0822 Reassessed the patient and updated her on workup findings.  She appears quite anxious.  Will order her dose of hydroxyzine [MP]   0846 Patient presents to emergency department with acute on chronic abdominal pain as well as chest tightness.  Worked up with labs, EKG, chest x-ray, CT abdomen and pelvis.  Noted to have right ovarian cyst but no other findings.  Pain improved after Pepcid, morphine, capsaicin cream although patient was still anxious.  Gave her dose of hydroxyzine and she has improved.  Will have her follow-up with PCP and gastroenterology.  Discussed ED return precautions.  She is otherwise well-appearing, hemodynamically stable, and therefore appropriate for discharge. [MP]      ED Course User Index  [MP] Jenny Greer PA-C     MDM: Patient presented the ED complaining of generalized abdominal pain, nausea, and vomiting.  She did not have an acute abdomen.  Labs were unremarkable.  CT scan showed a small right ovarian cyst but was otherwise negative.  Symptoms improved with IV fluids and IV medications.       Felix Izquierdo MD  09/27/24 0848

## 2024-09-27 NOTE — DISCHARGE INSTRUCTIONS
Go to office appointment with Dr. Fink as scheduled.  Follow-up with your primary care provider.  Work to limit your marijuana use to improve your pain.  Return to emergency department for any worsening symptoms.

## 2024-10-08 ENCOUNTER — HOSPITAL ENCOUNTER (EMERGENCY)
Facility: HOSPITAL | Age: 19
Discharge: HOME | End: 2024-10-08

## 2024-10-08 ENCOUNTER — TELEPHONE (OUTPATIENT)
Dept: ONCOLOGY | Facility: CLINIC | Age: 19
End: 2024-10-08

## 2024-10-08 VITALS
HEIGHT: 65 IN | TEMPERATURE: 97.7 F | RESPIRATION RATE: 16 BRPM | BODY MASS INDEX: 22.49 KG/M2 | WEIGHT: 135 LBS | HEART RATE: 116 BPM | OXYGEN SATURATION: 99 %

## 2024-10-08 PROCEDURE — 99211 OFF/OP EST MAY X REQ PHY/QHP: CPT

## 2024-10-09 ENCOUNTER — CONSULT (OUTPATIENT)
Dept: ONCOLOGY | Facility: CLINIC | Age: 19
End: 2024-10-09
Payer: MEDICAID

## 2024-10-09 ENCOUNTER — LAB (OUTPATIENT)
Dept: OTHER | Facility: HOSPITAL | Age: 19
End: 2024-10-09
Payer: MEDICAID

## 2024-10-09 VITALS
BODY MASS INDEX: 22.08 KG/M2 | WEIGHT: 132.5 LBS | SYSTOLIC BLOOD PRESSURE: 113 MMHG | DIASTOLIC BLOOD PRESSURE: 76 MMHG | HEART RATE: 90 BPM | OXYGEN SATURATION: 100 % | TEMPERATURE: 99 F | RESPIRATION RATE: 16 BRPM | HEIGHT: 65 IN

## 2024-10-09 DIAGNOSIS — D50.0 IRON DEFICIENCY ANEMIA DUE TO CHRONIC BLOOD LOSS: ICD-10-CM

## 2024-10-09 DIAGNOSIS — E80.20 PORPHYRIA, UNSPECIFIED PORPHYRIA TYPE: Primary | ICD-10-CM

## 2024-10-09 DIAGNOSIS — R10.9 RECURRENT ABDOMINAL PAIN: Primary | ICD-10-CM

## 2024-10-09 LAB
BASOPHILS # BLD AUTO: 0.05 10*3/MM3 (ref 0–0.2)
BASOPHILS NFR BLD AUTO: 0.7 % (ref 0–1.5)
DEPRECATED RDW RBC AUTO: 38 FL (ref 37–54)
EOSINOPHIL # BLD AUTO: 0.14 10*3/MM3 (ref 0–0.4)
EOSINOPHIL NFR BLD AUTO: 2 % (ref 0.3–6.2)
ERYTHROCYTE [DISTWIDTH] IN BLOOD BY AUTOMATED COUNT: 13.2 % (ref 12.3–15.4)
HCT VFR BLD AUTO: 34.4 % (ref 34–46.6)
HGB BLD-MCNC: 11.5 G/DL (ref 12–15.9)
IMM GRANULOCYTES # BLD AUTO: 0.11 10*3/MM3 (ref 0–0.05)
IMM GRANULOCYTES NFR BLD AUTO: 1.5 % (ref 0–0.5)
LYMPHOCYTES # BLD AUTO: 1.34 10*3/MM3 (ref 0.7–3.1)
LYMPHOCYTES NFR BLD AUTO: 18.7 % (ref 19.6–45.3)
MCH RBC QN AUTO: 26.7 PG (ref 26.6–33)
MCHC RBC AUTO-ENTMCNC: 33.4 G/DL (ref 31.5–35.7)
MCV RBC AUTO: 79.8 FL (ref 79–97)
MONOCYTES # BLD AUTO: 0.35 10*3/MM3 (ref 0.1–0.9)
MONOCYTES NFR BLD AUTO: 4.9 % (ref 5–12)
NEUTROPHILS NFR BLD AUTO: 5.17 10*3/MM3 (ref 1.7–7)
NEUTROPHILS NFR BLD AUTO: 72.2 % (ref 42.7–76)
NRBC BLD AUTO-RTO: 0 /100 WBC (ref 0–0.2)
PLAT MORPH BLD: NORMAL
PLATELET # BLD AUTO: 323 10*3/MM3 (ref 140–450)
PMV BLD AUTO: 9.9 FL (ref 6–12)
RBC # BLD AUTO: 4.31 10*6/MM3 (ref 3.77–5.28)
RBC MORPH BLD: NORMAL
WBC MORPH BLD: NORMAL
WBC NRBC COR # BLD AUTO: 7.16 10*3/MM3 (ref 3.4–10.8)

## 2024-10-09 PROCEDURE — 85025 COMPLETE CBC W/AUTO DIFF WBC: CPT | Performed by: INTERNAL MEDICINE

## 2024-10-09 PROCEDURE — 99243 OFF/OP CNSLTJ NEW/EST LOW 30: CPT | Performed by: INTERNAL MEDICINE

## 2024-10-09 PROCEDURE — 85007 BL SMEAR W/DIFF WBC COUNT: CPT | Performed by: INTERNAL MEDICINE

## 2024-10-09 NOTE — PROGRESS NOTES
Subjective     REASON FOR CONSULTATION:  Provide an opinion on any further workup or treatment on:    Evaluate for porphyria                       REQUESTING PHYSICIAN: Beba Gaytan MD    RECORDS OBTAINED: Records of the patients history including those obtained from the referring provider were reviewed and summarized in detail.    HISTORY OF PRESENT ILLNESS:    Dariel Schilling is a 19 y.o. patient who was referred for evaluation of possible porphyria.  She has history of iron deficiency anemia in the past.  She was previously treated at [place].  She did not respond to oral iron.  She was given IV iron infusions in the past. She received Venofer between 6/14/2022 and 7/12/2022 for a total of 1000 mg.    Patient reports having recurrent abdominal pain.  It is occurring at different areas of the abdomen.  Most recently, the pain was in the left lower quadrant.  She went to the ER and had CT scan done and was told she has an ovarian cyst.  She was seen by GYN and is being suspected of having endometriosis.  She was placed on birth control and she reports that she started feeling better after she was placed on the birth control.    Patient reports that the abdominal pain does not disappear completely.  It fluctuates in intensity from mild to severe, however.  The last time she had severe pain was 2-3 days ago.  She also had nausea and vomiting.  She had some red-colored material in the vomitus.  She was referred to GI for further evaluation but her appointment is in December.    Patient was referred to our office for evaluation of porphyria as the cause of the abdominal pain.  On 6/24/2024, she had urine test that revealed coproporphyrin I of 26 (0-15) and coproporphyrin  III of 92 (0-49).  On 7/8/2024, Porphobilinogen was 1.0 (0-2).     REVIEW OF SYSTEMS:  Pertinent ROS as in the HPI.     Past Medical History:   Diagnosis Date    Allergic     Anemia     iron deficiency    Anxiety     Asthma     Chlamydia      Chronic cough 05/29/2024    Eczema     Seasonal allergies     Visual impairment      Past Surgical History:   Procedure Laterality Date    COLONOSCOPY      WISDOM TOOTH EXTRACTION       Social History     Socioeconomic History    Marital status: Single   Tobacco Use    Smoking status: Never    Smokeless tobacco: Never   Vaping Use    Vaping status: Never Used   Substance and Sexual Activity    Alcohol use: Never    Drug use: Never     Types: Marijuana    Sexual activity: Yes     Partners: Female     Birth control/protection: Condom, Patch     Family History   Problem Relation Age of Onset    Breast cancer Maternal Great-Grandmother     Asthma Mother     Hyperlipidemia Mother     Hyperlipidemia Paternal Grandfather     Hyperlipidemia Paternal Grandmother     Vision loss Paternal Grandmother       MEDICATIONS:    Current Outpatient Medications:     albuterol sulfate  (90 Base) MCG/ACT inhaler, 1 to 2 puffs every 4 to 6 hours for cough, shortness of breath, and wheezing, Disp: , Rfl:     azelastine (ASTELIN) 0.1 % nasal spray, 2 sprays into the nostril(s) as directed by provider 2 (Two) Times a Day., Disp: 30 mL, Rfl: 6    Banophen 25 MG capsule, Take 1 capsule by mouth 2 (Two) Times a Day., Disp: , Rfl:     cetirizine (zyrTEC) 5 MG tablet, Take 1 tablet by mouth Every Morning., Disp: , Rfl:     Denta 5000 Plus 1.1 % cream, BRUSH WITH EACH NIGHT. EXPECTORATE (SPIT) DO NOT RINSE, Disp: , Rfl:     montelukast (Singulair) 10 MG tablet, Take 1 tablet by mouth Every Night., Disp: 30 tablet, Rfl: 3    ondansetron ODT (ZOFRAN-ODT) 4 MG disintegrating tablet, Take 1 tablet by mouth 4 (Four) Times a Day As Needed for Nausea or Vomiting., Disp: 15 tablet, Rfl: 0    norelgestromin-ethinyl estradiol (ORTHO EVRA) 150-35 MCG/24HR, Place 1 patch on the skin as directed by provider 1 (One) Time Per Week for 90 days., Disp: 12 patch, Rfl: 3    Scopolamine 1 MG/3DAYS patch, Place 1 patch on the skin as directed by provider Every  "72 (Seventy-Two) Hours. (Patient not taking: Reported on 10/9/2024), Disp: 30 each, Rfl: 0     ALLERGIES:  Allergies   Allergen Reactions    Pomegranate [Punica] Swelling      Objective   VITAL SIGNS:  Vitals:    10/09/24 1523   BP: 113/76   Pulse: 90   Resp: 16   Temp: 99 °F (37.2 °C)   TempSrc: Oral   SpO2: 100%   Weight: 60.1 kg (132 lb 8 oz)   Height: 165.1 cm (65\")   PainSc: 9  Comment: Stomach, headache, shortness of breath every morning   PainLoc: Abdomen     Wt Readings from Last 3 Encounters:   10/09/24 60.1 kg (132 lb 8 oz)   09/27/24 59.9 kg (132 lb)   09/02/24 61.2 kg (135 lb)     PHYSICAL EXAMINATION  GENERAL:  The patient appears in good general condition, not in acute distress.  SKIN: No skin rashes. No ecchymosis.  HEAD:  Normocephalic.  EYES:  No Jaundice. No Pallor. Pupils equal. EOMI.  NECK:  Supple with Good ROM. No Thyromegaly. No Masses.  LYMPHATICS:  No cervical or supraclavicular lymphadenopathy.  CHEST: Normal respiratory effort. Lungs clear to auscultation.   CARDIAC:  Normal S1 & S2. No murmur. No edema.  ABDOMEN:  Soft. No tenderness. No Hepatomegaly. No Splenomegaly. No masses.  EXTREMITIES:  No clubbing. No noted deformity. No Calf tenderness.  NEUROLOGICAL:  No Focal neurological deficits.     RESULT REVIEW:   Results from last 7 days   Lab Units 10/09/24  1514   WBC 10*3/mm3 7.16   NEUTROS ABS 10*3/mm3 5.17   HEMOGLOBIN g/dL 11.5*   HEMATOCRIT % 34.4   PLATELETS 10*3/mm3 323     Component      Latest Ref Rng 6/24/2024 7/8/2024   Uroporphyrin      0 - 20 ug/L 2     Heptacarboxyl (7-CP), Urine      0 - 2 ug/L <1     Hexacarboxyl (6-CP), Urine      0 - 1 ug/L <1     Pentacarb(5-CP) Urine      0 - 2 ug/L <1     Coproporphyrin (CP) I, Urine      0 - 15 ug/L 26 (H)     Coproporphyrin (CP) III, Urine      0 - 49 ug/L 92 (H)     Porphobilinogen, Urine      0.0 - 2.0 mg/L  1.0       CT scan abdomen and pelvis on 9/27/2024:  No findings of small bowel obstruction. The appendix is " air-filled  throughout its course without significant surrounding fat stranding.     Subcentimeter hepatic lesions are too small to characterize.  Gallbladder,, pancreas, spleen, adrenal glands and kidneys have an  unremarkable postcontrast CT appearance. No hydronephrosis. However,  please note portions of the distal ureters are difficult to follow in  their entirety due to relative lack of free intraperitoneal fat. Bladder  is underdistended and cannot be evaluated.     No abdominal pelvic adenopathy by size criteria. Hypodense right adnexal  lesion measuring up to 2.4 cm is present. There is also small amount of  free fluid in the pelvis. These findings are within normal limits for  premenopausal patient. No a few foci of air encircle the lower uterus  and follows the course of the previously seen vaginal ring on 8/21/2023.  No suspicious lytic or blastic osseous lesions.     IMPRESSION:  1.  No CT findings of acute intraabdominal pathology.  2.  Other findings as above.    Assessment & Plan   *Recurrent abdominal pain.  It is occasionally associated with nausea and vomiting.  She was referred to GI.  Her appointment is in December.  She was seen by GYN and they suspect endometriosis as the cause.  Question was raised regarding this being due to porphyria.  On review of the urine test from 6/24/2024, there was increase in the coproporphyrin but it was not a significant increase, however.  Urine color is normal.    *Iron deficiency anemia.  She has history of iron deficiency anemia.  She did not respond to oral iron.  She was given IV Venofer between 6/14/2022 and 7/12/2022 for a total of 1000 mg.  10/9/2024: Hemoglobin 11.5.    PLAN:    1.  I recommended obtaining spot urine test for porphobilinogen and total porphyrins in the future when she has an episode of severe abdominal pain.  She will notify us when she has an episode of severe abdominal pain and we will arrange for the test to be done at that  point.    2.  Follow-up in 3 months.  Will obtain CBC ferritin iron panel vitamin B12 and folate levels.      Yosef Hollis MD  10/09/24

## 2024-10-10 ENCOUNTER — PATIENT ROUNDING (BHMG ONLY) (OUTPATIENT)
Dept: ONCOLOGY | Facility: CLINIC | Age: 19
End: 2024-10-10

## 2024-10-19 ENCOUNTER — HOSPITAL ENCOUNTER (OUTPATIENT)
Facility: HOSPITAL | Age: 19
Setting detail: OBSERVATION
Discharge: HOME | End: 2024-10-24
Attending: EMERGENCY MEDICINE | Admitting: INTERNAL MEDICINE
Payer: MEDICAID

## 2024-10-19 ENCOUNTER — APPOINTMENT (OUTPATIENT)
Dept: GENERAL RADIOLOGY | Facility: HOSPITAL | Age: 19
End: 2024-10-19
Payer: MEDICAID

## 2024-10-19 ENCOUNTER — HOSPITAL ENCOUNTER (EMERGENCY)
Facility: HOSPITAL | Age: 19
Discharge: HOME | End: 2024-10-19
Attending: EMERGENCY MEDICINE
Payer: MEDICAID

## 2024-10-19 VITALS
TEMPERATURE: 99.7 F | WEIGHT: 135 LBS | HEART RATE: 89 BPM | DIASTOLIC BLOOD PRESSURE: 72 MMHG | SYSTOLIC BLOOD PRESSURE: 112 MMHG | OXYGEN SATURATION: 99 % | HEIGHT: 65 IN | BODY MASS INDEX: 22.49 KG/M2 | RESPIRATION RATE: 16 BRPM

## 2024-10-19 DIAGNOSIS — R51.9 FRONTAL HEADACHE: ICD-10-CM

## 2024-10-19 DIAGNOSIS — G89.18 ACUTE POSTOPERATIVE PAIN: ICD-10-CM

## 2024-10-19 DIAGNOSIS — R10.9 INTRACTABLE ABDOMINAL PAIN: ICD-10-CM

## 2024-10-19 DIAGNOSIS — K82.8 BILIARY DYSKINESIA: ICD-10-CM

## 2024-10-19 DIAGNOSIS — J30.2 SEASONAL ALLERGIES: ICD-10-CM

## 2024-10-19 DIAGNOSIS — R10.84 GENERALIZED ABDOMINAL PAIN: Primary | ICD-10-CM

## 2024-10-19 DIAGNOSIS — R11.2 NAUSEA AND VOMITING, UNSPECIFIED VOMITING TYPE: Primary | ICD-10-CM

## 2024-10-19 DIAGNOSIS — R51.9 ACUTE NONINTRACTABLE HEADACHE, UNSPECIFIED HEADACHE TYPE: ICD-10-CM

## 2024-10-19 LAB
ALBUMIN SERPL-MCNC: 4 G/DL (ref 3.5–5.2)
ALBUMIN SERPL-MCNC: 4 G/DL (ref 3.5–5.2)
ALBUMIN/GLOB SERPL: 1.3 G/DL
ALBUMIN/GLOB SERPL: 1.3 G/DL
ALP SERPL-CCNC: 62 U/L (ref 39–117)
ALP SERPL-CCNC: 67 U/L (ref 39–117)
ALT SERPL W P-5'-P-CCNC: 5 U/L (ref 1–33)
ALT SERPL W P-5'-P-CCNC: 5 U/L (ref 1–33)
ANION GAP SERPL CALCULATED.3IONS-SCNC: 10.8 MMOL/L (ref 5–15)
ANION GAP SERPL CALCULATED.3IONS-SCNC: 11 MMOL/L (ref 5–15)
AST SERPL-CCNC: 9 U/L (ref 1–32)
AST SERPL-CCNC: 9 U/L (ref 1–32)
B PARAPERT DNA SPEC QL NAA+PROBE: NOT DETECTED
B PERT DNA SPEC QL NAA+PROBE: NOT DETECTED
BACTERIA UR QL AUTO: ABNORMAL /HPF
BASOPHILS # BLD AUTO: 0.02 10*3/MM3 (ref 0–0.2)
BASOPHILS # BLD AUTO: 0.04 10*3/MM3 (ref 0–0.2)
BASOPHILS NFR BLD AUTO: 0.2 % (ref 0–1.5)
BASOPHILS NFR BLD AUTO: 0.5 % (ref 0–1.5)
BILIRUB SERPL-MCNC: 0.3 MG/DL (ref 0–1.2)
BILIRUB SERPL-MCNC: 0.3 MG/DL (ref 0–1.2)
BILIRUB UR QL STRIP: NEGATIVE
BUN SERPL-MCNC: 5 MG/DL (ref 6–20)
BUN SERPL-MCNC: 6 MG/DL (ref 6–20)
BUN/CREAT SERPL: 6.3 (ref 7–25)
BUN/CREAT SERPL: 9 (ref 7–25)
C PNEUM DNA NPH QL NAA+NON-PROBE: NOT DETECTED
CALCIUM SPEC-SCNC: 9 MG/DL (ref 8.6–10.5)
CALCIUM SPEC-SCNC: 9.4 MG/DL (ref 8.6–10.5)
CHLORIDE SERPL-SCNC: 103 MMOL/L (ref 98–107)
CHLORIDE SERPL-SCNC: 103 MMOL/L (ref 98–107)
CLARITY UR: CLEAR
CO2 SERPL-SCNC: 22.2 MMOL/L (ref 22–29)
CO2 SERPL-SCNC: 24 MMOL/L (ref 22–29)
COLOR UR: YELLOW
CREAT SERPL-MCNC: 0.67 MG/DL (ref 0.57–1)
CREAT SERPL-MCNC: 0.79 MG/DL (ref 0.57–1)
DEPRECATED RDW RBC AUTO: 39.3 FL (ref 37–54)
DEPRECATED RDW RBC AUTO: 40.8 FL (ref 37–54)
EGFRCR SERPLBLD CKD-EPI 2021: 110 ML/MIN/1.73
EGFRCR SERPLBLD CKD-EPI 2021: 128.5 ML/MIN/1.73
EOSINOPHIL # BLD AUTO: 0.01 10*3/MM3 (ref 0–0.4)
EOSINOPHIL # BLD AUTO: 0.25 10*3/MM3 (ref 0–0.4)
EOSINOPHIL NFR BLD AUTO: 0.1 % (ref 0.3–6.2)
EOSINOPHIL NFR BLD AUTO: 2.9 % (ref 0.3–6.2)
ERYTHROCYTE [DISTWIDTH] IN BLOOD BY AUTOMATED COUNT: 13 % (ref 12.3–15.4)
ERYTHROCYTE [DISTWIDTH] IN BLOOD BY AUTOMATED COUNT: 13.3 % (ref 12.3–15.4)
FLUAV SUBTYP SPEC NAA+PROBE: NOT DETECTED
FLUBV RNA ISLT QL NAA+PROBE: NOT DETECTED
GLOBULIN UR ELPH-MCNC: 3 GM/DL
GLOBULIN UR ELPH-MCNC: 3.1 GM/DL
GLUCOSE SERPL-MCNC: 94 MG/DL (ref 65–99)
GLUCOSE SERPL-MCNC: 97 MG/DL (ref 65–99)
GLUCOSE UR STRIP-MCNC: NEGATIVE MG/DL
HADV DNA SPEC NAA+PROBE: NOT DETECTED
HCG SERPL QL: NEGATIVE
HCOV 229E RNA SPEC QL NAA+PROBE: NOT DETECTED
HCOV HKU1 RNA SPEC QL NAA+PROBE: NOT DETECTED
HCOV NL63 RNA SPEC QL NAA+PROBE: NOT DETECTED
HCOV OC43 RNA SPEC QL NAA+PROBE: NOT DETECTED
HCT VFR BLD AUTO: 37.4 % (ref 34–46.6)
HCT VFR BLD AUTO: 37.8 % (ref 34–46.6)
HGB BLD-MCNC: 11.8 G/DL (ref 12–15.9)
HGB BLD-MCNC: 12 G/DL (ref 12–15.9)
HGB UR QL STRIP.AUTO: NEGATIVE
HMPV RNA NPH QL NAA+NON-PROBE: NOT DETECTED
HPIV1 RNA ISLT QL NAA+PROBE: NOT DETECTED
HPIV2 RNA SPEC QL NAA+PROBE: NOT DETECTED
HPIV3 RNA NPH QL NAA+PROBE: NOT DETECTED
HPIV4 P GENE NPH QL NAA+PROBE: NOT DETECTED
HYALINE CASTS UR QL AUTO: ABNORMAL /LPF
IMM GRANULOCYTES # BLD AUTO: 0.02 10*3/MM3 (ref 0–0.05)
IMM GRANULOCYTES # BLD AUTO: 0.03 10*3/MM3 (ref 0–0.05)
IMM GRANULOCYTES NFR BLD AUTO: 0.2 % (ref 0–0.5)
IMM GRANULOCYTES NFR BLD AUTO: 0.3 % (ref 0–0.5)
KETONES UR QL STRIP: ABNORMAL
LEUKOCYTE ESTERASE UR QL STRIP.AUTO: ABNORMAL
LIPASE SERPL-CCNC: 15 U/L (ref 13–60)
LYMPHOCYTES # BLD AUTO: 0.77 10*3/MM3 (ref 0.7–3.1)
LYMPHOCYTES # BLD AUTO: 1.16 10*3/MM3 (ref 0.7–3.1)
LYMPHOCYTES NFR BLD AUTO: 13.3 % (ref 19.6–45.3)
LYMPHOCYTES NFR BLD AUTO: 6.4 % (ref 19.6–45.3)
M PNEUMO IGG SER IA-ACNC: NOT DETECTED
MCH RBC QN AUTO: 26.3 PG (ref 26.6–33)
MCH RBC QN AUTO: 26.6 PG (ref 26.6–33)
MCHC RBC AUTO-ENTMCNC: 31.2 G/DL (ref 31.5–35.7)
MCHC RBC AUTO-ENTMCNC: 32.1 G/DL (ref 31.5–35.7)
MCV RBC AUTO: 82.9 FL (ref 79–97)
MCV RBC AUTO: 84.2 FL (ref 79–97)
MONOCYTES # BLD AUTO: 0.5 10*3/MM3 (ref 0.1–0.9)
MONOCYTES # BLD AUTO: 0.6 10*3/MM3 (ref 0.1–0.9)
MONOCYTES NFR BLD AUTO: 5 % (ref 5–12)
MONOCYTES NFR BLD AUTO: 5.7 % (ref 5–12)
NEUTROPHILS NFR BLD AUTO: 10.57 10*3/MM3 (ref 1.7–7)
NEUTROPHILS NFR BLD AUTO: 6.73 10*3/MM3 (ref 1.7–7)
NEUTROPHILS NFR BLD AUTO: 77.3 % (ref 42.7–76)
NEUTROPHILS NFR BLD AUTO: 88.1 % (ref 42.7–76)
NITRITE UR QL STRIP: NEGATIVE
NRBC BLD AUTO-RTO: 0 /100 WBC (ref 0–0.2)
NRBC BLD AUTO-RTO: 0 /100 WBC (ref 0–0.2)
PH UR STRIP.AUTO: >=9 [PH] (ref 5–8)
PLATELET # BLD AUTO: 325 10*3/MM3 (ref 140–450)
PLATELET # BLD AUTO: 330 10*3/MM3 (ref 140–450)
PMV BLD AUTO: 9.6 FL (ref 6–12)
PMV BLD AUTO: 9.7 FL (ref 6–12)
POTASSIUM SERPL-SCNC: 3.6 MMOL/L (ref 3.5–5.2)
POTASSIUM SERPL-SCNC: 3.6 MMOL/L (ref 3.5–5.2)
PROT SERPL-MCNC: 7 G/DL (ref 6–8.5)
PROT SERPL-MCNC: 7.1 G/DL (ref 6–8.5)
PROT UR QL STRIP: NEGATIVE
RBC # BLD AUTO: 4.49 10*6/MM3 (ref 3.77–5.28)
RBC # BLD AUTO: 4.51 10*6/MM3 (ref 3.77–5.28)
RBC # UR STRIP: ABNORMAL /HPF
REF LAB TEST METHOD: ABNORMAL
RHINOVIRUS RNA SPEC NAA+PROBE: NOT DETECTED
RSV RNA NPH QL NAA+NON-PROBE: NOT DETECTED
SARS-COV-2 RNA NPH QL NAA+NON-PROBE: NOT DETECTED
SODIUM SERPL-SCNC: 136 MMOL/L (ref 136–145)
SODIUM SERPL-SCNC: 138 MMOL/L (ref 136–145)
SP GR UR STRIP: 1.01 (ref 1–1.03)
SQUAMOUS #/AREA URNS HPF: ABNORMAL /HPF
UROBILINOGEN UR QL STRIP: ABNORMAL
WBC # UR STRIP: ABNORMAL /HPF
WBC NRBC COR # BLD AUTO: 11.99 10*3/MM3 (ref 3.4–10.8)
WBC NRBC COR # BLD AUTO: 8.71 10*3/MM3 (ref 3.4–10.8)
WHOLE BLOOD HOLD COAG: NORMAL

## 2024-10-19 PROCEDURE — 83690 ASSAY OF LIPASE: CPT | Performed by: EMERGENCY MEDICINE

## 2024-10-19 PROCEDURE — 80307 DRUG TEST PRSMV CHEM ANLYZR: CPT | Performed by: INTERNAL MEDICINE

## 2024-10-19 PROCEDURE — 25010000002 ONDANSETRON PER 1 MG: Performed by: EMERGENCY MEDICINE

## 2024-10-19 PROCEDURE — 36415 COLL VENOUS BLD VENIPUNCTURE: CPT

## 2024-10-19 PROCEDURE — 25010000002 MORPHINE PER 10 MG: Performed by: EMERGENCY MEDICINE

## 2024-10-19 PROCEDURE — 93010 ELECTROCARDIOGRAM REPORT: CPT | Performed by: INTERNAL MEDICINE

## 2024-10-19 PROCEDURE — 25810000003 SODIUM CHLORIDE 0.9 % SOLUTION: Performed by: EMERGENCY MEDICINE

## 2024-10-19 PROCEDURE — G0378 HOSPITAL OBSERVATION PER HR: HCPCS

## 2024-10-19 PROCEDURE — 81001 URINALYSIS AUTO W/SCOPE: CPT | Performed by: EMERGENCY MEDICINE

## 2024-10-19 PROCEDURE — 25010000002 SODIUM CHLORIDE 0.9 % WITH KCL 20 MEQ 20-0.9 MEQ/L-% SOLUTION: Performed by: INTERNAL MEDICINE

## 2024-10-19 PROCEDURE — 25010000002 DROPERIDOL PER 5 MG: Performed by: PHYSICIAN ASSISTANT

## 2024-10-19 PROCEDURE — 96374 THER/PROPH/DIAG INJ IV PUSH: CPT

## 2024-10-19 PROCEDURE — 25010000002 METOCLOPRAMIDE PER 10 MG: Performed by: EMERGENCY MEDICINE

## 2024-10-19 PROCEDURE — 85025 COMPLETE CBC W/AUTO DIFF WBC: CPT | Performed by: PHYSICIAN ASSISTANT

## 2024-10-19 PROCEDURE — 99285 EMERGENCY DEPT VISIT HI MDM: CPT

## 2024-10-19 PROCEDURE — 96375 TX/PRO/DX INJ NEW DRUG ADDON: CPT

## 2024-10-19 PROCEDURE — 80053 COMPREHEN METABOLIC PANEL: CPT | Performed by: PHYSICIAN ASSISTANT

## 2024-10-19 PROCEDURE — 80053 COMPREHEN METABOLIC PANEL: CPT | Performed by: EMERGENCY MEDICINE

## 2024-10-19 PROCEDURE — 84703 CHORIONIC GONADOTROPIN ASSAY: CPT | Performed by: EMERGENCY MEDICINE

## 2024-10-19 PROCEDURE — 71045 X-RAY EXAM CHEST 1 VIEW: CPT

## 2024-10-19 PROCEDURE — 84311 SPECTROPHOTOMETRY: CPT | Performed by: EMERGENCY MEDICINE

## 2024-10-19 PROCEDURE — 93005 ELECTROCARDIOGRAM TRACING: CPT | Performed by: PHYSICIAN ASSISTANT

## 2024-10-19 PROCEDURE — 0202U NFCT DS 22 TRGT SARS-COV-2: CPT | Performed by: PHYSICIAN ASSISTANT

## 2024-10-19 PROCEDURE — 85025 COMPLETE CBC W/AUTO DIFF WBC: CPT | Performed by: EMERGENCY MEDICINE

## 2024-10-19 PROCEDURE — 25010000002 KETOROLAC TROMETHAMINE PER 15 MG: Performed by: EMERGENCY MEDICINE

## 2024-10-19 PROCEDURE — 99283 EMERGENCY DEPT VISIT LOW MDM: CPT

## 2024-10-19 PROCEDURE — 96361 HYDRATE IV INFUSION ADD-ON: CPT

## 2024-10-19 PROCEDURE — 84110 ASSAY OF PORPHOBILINOGEN: CPT | Performed by: EMERGENCY MEDICINE

## 2024-10-19 PROCEDURE — 25010000002 DIPHENHYDRAMINE PER 50 MG: Performed by: EMERGENCY MEDICINE

## 2024-10-19 RX ORDER — BISACODYL 5 MG/1
5 TABLET, DELAYED RELEASE ORAL DAILY PRN
Status: DISCONTINUED | OUTPATIENT
Start: 2024-10-19 | End: 2024-10-24 | Stop reason: HOSPADM

## 2024-10-19 RX ORDER — SODIUM CHLORIDE 0.9 % (FLUSH) 0.9 %
10 SYRINGE (ML) INJECTION AS NEEDED
Status: DISCONTINUED | OUTPATIENT
Start: 2024-10-19 | End: 2024-10-24 | Stop reason: HOSPADM

## 2024-10-19 RX ORDER — SODIUM CHLORIDE 0.9 % (FLUSH) 0.9 %
10 SYRINGE (ML) INJECTION AS NEEDED
Status: DISCONTINUED | OUTPATIENT
Start: 2024-10-19 | End: 2024-10-19 | Stop reason: HOSPADM

## 2024-10-19 RX ORDER — ACETAMINOPHEN 160 MG/5ML
650 SOLUTION ORAL EVERY 4 HOURS PRN
Status: DISCONTINUED | OUTPATIENT
Start: 2024-10-19 | End: 2024-10-24 | Stop reason: HOSPADM

## 2024-10-19 RX ORDER — KETOROLAC TROMETHAMINE 15 MG/ML
15 INJECTION, SOLUTION INTRAMUSCULAR; INTRAVENOUS ONCE
Status: COMPLETED | OUTPATIENT
Start: 2024-10-19 | End: 2024-10-19

## 2024-10-19 RX ORDER — DROPERIDOL 2.5 MG/ML
2.5 INJECTION, SOLUTION INTRAMUSCULAR; INTRAVENOUS ONCE
Status: COMPLETED | OUTPATIENT
Start: 2024-10-19 | End: 2024-10-19

## 2024-10-19 RX ORDER — METOCLOPRAMIDE HYDROCHLORIDE 5 MG/ML
10 INJECTION INTRAMUSCULAR; INTRAVENOUS ONCE
Status: COMPLETED | OUTPATIENT
Start: 2024-10-19 | End: 2024-10-19

## 2024-10-19 RX ORDER — ONDANSETRON 2 MG/ML
4 INJECTION INTRAMUSCULAR; INTRAVENOUS ONCE
Status: COMPLETED | OUTPATIENT
Start: 2024-10-19 | End: 2024-10-19

## 2024-10-19 RX ORDER — ACETAMINOPHEN 325 MG/1
650 TABLET ORAL EVERY 4 HOURS PRN
Status: DISCONTINUED | OUTPATIENT
Start: 2024-10-19 | End: 2024-10-24 | Stop reason: HOSPADM

## 2024-10-19 RX ORDER — MORPHINE SULFATE 2 MG/ML
2 INJECTION, SOLUTION INTRAMUSCULAR; INTRAVENOUS EVERY 4 HOURS PRN
Status: DISCONTINUED | OUTPATIENT
Start: 2024-10-19 | End: 2024-10-24 | Stop reason: HOSPADM

## 2024-10-19 RX ORDER — ALBUTEROL SULFATE 0.83 MG/ML
2.5 SOLUTION RESPIRATORY (INHALATION) EVERY 6 HOURS PRN
Status: DISCONTINUED | OUTPATIENT
Start: 2024-10-19 | End: 2024-10-24 | Stop reason: HOSPADM

## 2024-10-19 RX ORDER — MORPHINE SULFATE 2 MG/ML
4 INJECTION, SOLUTION INTRAMUSCULAR; INTRAVENOUS EVERY 4 HOURS PRN
Status: DISCONTINUED | OUTPATIENT
Start: 2024-10-19 | End: 2024-10-19 | Stop reason: ALTCHOICE

## 2024-10-19 RX ORDER — ONDANSETRON 2 MG/ML
4 INJECTION INTRAMUSCULAR; INTRAVENOUS EVERY 6 HOURS PRN
Status: DISCONTINUED | OUTPATIENT
Start: 2024-10-19 | End: 2024-10-24 | Stop reason: HOSPADM

## 2024-10-19 RX ORDER — DIPHENHYDRAMINE HYDROCHLORIDE 50 MG/ML
25 INJECTION INTRAMUSCULAR; INTRAVENOUS ONCE
Status: COMPLETED | OUTPATIENT
Start: 2024-10-19 | End: 2024-10-19

## 2024-10-19 RX ORDER — SODIUM CHLORIDE AND POTASSIUM CHLORIDE 150; 900 MG/100ML; MG/100ML
100 INJECTION, SOLUTION INTRAVENOUS CONTINUOUS
Status: DISPENSED | OUTPATIENT
Start: 2024-10-19 | End: 2024-10-21

## 2024-10-19 RX ORDER — ACETAMINOPHEN 650 MG/1
650 SUPPOSITORY RECTAL EVERY 4 HOURS PRN
Status: DISCONTINUED | OUTPATIENT
Start: 2024-10-19 | End: 2024-10-24 | Stop reason: HOSPADM

## 2024-10-19 RX ORDER — MORPHINE SULFATE 2 MG/ML
4 INJECTION, SOLUTION INTRAMUSCULAR; INTRAVENOUS ONCE
Status: COMPLETED | OUTPATIENT
Start: 2024-10-19 | End: 2024-10-19

## 2024-10-19 RX ORDER — POLYETHYLENE GLYCOL 3350 17 G/17G
17 POWDER, FOR SOLUTION ORAL DAILY PRN
Status: DISCONTINUED | OUTPATIENT
Start: 2024-10-19 | End: 2024-10-24 | Stop reason: HOSPADM

## 2024-10-19 RX ORDER — PANTOPRAZOLE SODIUM 40 MG/10ML
40 INJECTION, POWDER, LYOPHILIZED, FOR SOLUTION INTRAVENOUS
Status: DISCONTINUED | OUTPATIENT
Start: 2024-10-20 | End: 2024-10-22

## 2024-10-19 RX ORDER — BISACODYL 10 MG
10 SUPPOSITORY, RECTAL RECTAL DAILY PRN
Status: DISCONTINUED | OUTPATIENT
Start: 2024-10-19 | End: 2024-10-24 | Stop reason: HOSPADM

## 2024-10-19 RX ORDER — AMOXICILLIN 250 MG
2 CAPSULE ORAL 2 TIMES DAILY PRN
Status: DISCONTINUED | OUTPATIENT
Start: 2024-10-19 | End: 2024-10-24 | Stop reason: HOSPADM

## 2024-10-19 RX ORDER — MONTELUKAST SODIUM 10 MG/1
10 TABLET ORAL NIGHTLY
Status: DISCONTINUED | OUTPATIENT
Start: 2024-10-19 | End: 2024-10-24 | Stop reason: HOSPADM

## 2024-10-19 RX ADMIN — METOCLOPRAMIDE 10 MG: 5 INJECTION, SOLUTION INTRAMUSCULAR; INTRAVENOUS at 05:46

## 2024-10-19 RX ADMIN — DIPHENHYDRAMINE HYDROCHLORIDE 25 MG: 50 INJECTION, SOLUTION INTRAMUSCULAR; INTRAVENOUS at 05:46

## 2024-10-19 RX ADMIN — KETOROLAC TROMETHAMINE 15 MG: 15 INJECTION, SOLUTION INTRAMUSCULAR; INTRAVENOUS at 05:46

## 2024-10-19 RX ADMIN — SODIUM CHLORIDE 500 ML: 9 INJECTION, SOLUTION INTRAVENOUS at 04:59

## 2024-10-19 RX ADMIN — MORPHINE SULFATE 4 MG: 2 INJECTION, SOLUTION INTRAMUSCULAR; INTRAVENOUS at 04:59

## 2024-10-19 RX ADMIN — ONDANSETRON 4 MG: 2 INJECTION, SOLUTION INTRAMUSCULAR; INTRAVENOUS at 04:58

## 2024-10-19 RX ADMIN — DROPERIDOL 2.5 MG: 2.5 INJECTION, SOLUTION INTRAMUSCULAR; INTRAVENOUS at 18:37

## 2024-10-19 RX ADMIN — POTASSIUM CHLORIDE AND SODIUM CHLORIDE 100 ML/HR: 900; 150 INJECTION, SOLUTION INTRAVENOUS at 19:41

## 2024-10-19 RX ADMIN — MORPHINE SULFATE 4 MG: 2 INJECTION, SOLUTION INTRAMUSCULAR; INTRAVENOUS at 16:28

## 2024-10-19 RX ADMIN — ONDANSETRON 4 MG: 2 INJECTION INTRAMUSCULAR; INTRAVENOUS at 16:28

## 2024-10-19 NOTE — ED NOTES
".Nursing report ED to floor  [facility]  19 y.o.  female    HPI :  HPI  Stated Reason for Visit: n/v fever and abd pain    Chief Complaint  Chief Complaint   Patient presents with    Fever    Nausea    Vomiting    Abdominal Pain       Admitting doctor:   Maryam Dexter MD    Admitting diagnosis:   The encounter diagnosis was Intractable abdominal pain.    Code status:   Current Code Status       Date Active Code Status Order ID Comments User Context       10/19/2024 1856 CPR (Attempt to Resuscitate) [id number]  Maryam Dexter MD ED        Question Answer    Code Status (Patient has no pulse and is not breathing) CPR (Attempt to Resuscitate)    Medical Interventions (Patient has pulse or is breathing) Full Support                    Allergies:   Pomegranate [punica]    Isolation:   No active isolations    Intake and Output  No intake or output data in the 24 hours ending 10/19/24 1900    Weight:       10/19/24  1559   Weight: 61.2 kg (135 lb)       Most recent vitals:   Vitals:    10/19/24 1559 10/19/24 1631   BP:  118/81   Pulse: (!) 150 (!) 127   Resp: 18 16   Temp: 100.4 °F (38 °C)    TempSrc: Tympanic    SpO2: 98% 97%   Weight: 61.2 kg (135 lb)    Height: 165.1 cm (65\")        Active LDAs/IV Access:   Lines, Drains & Airways       Active LDAs       Name Placement date Placement time Site Days    Peripheral IV 10/19/24 1623 Right Antecubital 10/19/24  1623  Antecubital  less than 1                    Labs (abnormal labs have a star):   Labs Reviewed   COMPREHENSIVE METABOLIC PANEL - Abnormal; Notable for the following components:       Result Value    BUN 5 (*)     BUN/Creatinine Ratio 6.3 (*)     All other components within normal limits    Narrative:     GFR Normal >60  Chronic Kidney Disease <60  Kidney Failure <15     CBC WITH AUTO DIFFERENTIAL - Abnormal; Notable for the following components:    WBC 11.99 (*)     Neutrophil % 88.1 (*)     Lymphocyte % 6.4 (*)     Eosinophil % 0.1 (*)     " Neutrophils, Absolute 10.57 (*)     All other components within normal limits   RESPIRATORY PANEL PCR W/ COVID-19 (SARS-COV-2), NP SWAB IN UTM/VTP, 2 HR TAT - Normal    Narrative:     In the setting of a positive respiratory panel with a viral infection PLUS a negative procalcitonin without other underlying concern for bacterial infection, consider observing off antibiotics or discontinuation of antibiotics and continue supportive care. If the respiratory panel is positive for atypical bacterial infection (Bordetella pertussis, Chlamydophila pneumoniae, or Mycoplasma pneumoniae), consider antibiotic de-escalation to target atypical bacterial infection.   CBC AND DIFFERENTIAL    Narrative:     The following orders were created for panel order CBC & Differential.  Procedure                               Abnormality         Status                     ---------                               -----------         ------                     CBC Auto Differential[[id number]]        Abnormal            Final result                 Please view results for these tests on the individual orders.       EKG:   ECG 12 Lead QT Measurement   Preliminary Result   HEART RATE=96  bpm   RR Uipvzzxb=184  ms   TN Jcovxlge=323  ms   P Horizontal Axis=-18  deg   P Front Axis=97  deg   QRSD Interval=81  ms   QT Slaguqdv=496  ms   TZiI=805  ms   QRS Axis=88  deg   T Wave Axis=-2  deg   - BORDERLINE ECG -   Sinus rhythm   Borderline T abnormalities, inferior leads   Date and Time of Study:2024-10-19 18:49:55          Meds given in ED:   Medications   sodium chloride 0.9 % flush 10 mL (has no administration in time range)   acetaminophen (TYLENOL) tablet 650 mg (has no administration in time range)     Or   acetaminophen (TYLENOL) 160 MG/5ML oral solution 650 mg (has no administration in time range)     Or   acetaminophen (TYLENOL) suppository 650 mg (has no administration in time range)   ondansetron (ZOFRAN) injection 4 mg (has no administration  in time range)   sennosides-docusate (PERICOLACE) 8.6-50 MG per tablet 2 tablet (has no administration in time range)     And   polyethylene glycol (MIRALAX) packet 17 g (has no administration in time range)     And   bisacodyl (DULCOLAX) EC tablet 5 mg (has no administration in time range)     And   bisacodyl (DULCOLAX) suppository 10 mg (has no administration in time range)   sodium chloride 0.9 % with KCl 20 mEq/L infusion (has no administration in time range)   morphine injection 4 mg (has no administration in time range)   morphine injection 4 mg (4 mg Intravenous Given 10/19/24 1628)   ondansetron (ZOFRAN) injection 4 mg (4 mg Intravenous Given 10/19/24 1628)   droperidol (INAPSINE) injection 2.5 mg (2.5 mg Intravenous Given 10/19/24 1837)       Imaging results:  XR Chest 1 View    Result Date: 10/19/2024  No evidence for acute pulmonary process. Follow-up as clinical indications persist.  This report was finalized on 10/19/2024 6:38 AM by Dr. Josh Samuels M.D on Workstation: [employer]       Ambulatory status:   - Assist    Social issues:   Social History     Socioeconomic History    Marital status: Single   Tobacco Use    Smoking status: Never    Smokeless tobacco: Never   Vaping Use    Vaping status: Never Used   Substance and Sexual Activity    Alcohol use: Never    Drug use: Never     Types: Marijuana    Sexual activity: Yes     Partners: Female     Birth control/protection: Condom, Patch       Peripheral Neurovascular  Peripheral Neurovascular (Adult)  Peripheral Neurovascular WDL: WDL    Neuro Cognitive  Neuro Cognitive (Adult)  Cognitive/Neuro/Behavioral WDL: WDL    Learning  Learning Assessment  Learning Readiness and Ability: no barriers identified  Education Provided  Person Taught: patient    Respiratory  Respiratory WDL  Respiratory WDL: WDL    Abdominal Pain       Pain Assessments  Pain (Adult)  (0-10) Pain Rating: Rest: 9  (0-10) Pain Rating: Activity: 9      Dusty Valle RN  10/19/24  19:00 EDT

## 2024-10-19 NOTE — ED PROVIDER NOTES
Pt presents to the ED c/o acute on chronic abdominal pain, nausea, vomiting.  This is her second ER visit of the day.  Has been seen by multiple specialist in the past for similar issues.  Patient with escalating symptoms through the day today.     On exam,   General: Appears uncomfortable, nondiaphoretic  HEENT: Mucous membranes moist, atraumatic, EOMI  Neck: Full ROM  Pulm: Symmetric chest rise, nonlabored, lungs CTAB  Cardiovascular: Mild regular rhythm tachycardia, intact distal pulses  MSK: Full ROM, no deformity  Skin: Warm, dry  Neuro: Awake, alert, oriented x 4, GCS 15, moving all extremities, no focal deficits  Psych: Calm, cooperative        Plan: Plan for labs, supportive care, and reevaluation with results.  Urinalysis obtained earlier today does show some ketones in the urine.  Plan for IV fluids, pain control, and reevaluation.    ED Course as of 10/19/24 1955   Sat Oct 19, 2024   1638 Patient presents with acute on chronic abdominal pain, nausea, vomiting, fever.  Patient seen here in the ER earlier this morning with a fairly benign workup.  Plan for repeat labs, pain control.  Will hold off on imaging barring abnormal lab work. [EE]   1644 WBC(!): 11.99 [EE]   1644 Hemoglobin: 12.0 [EE]   1806 COVID19: Not Detected [EE]   1821 Recheck of patient.  She states she is still having abdominal pain.  Inapsine ordered.  Plan to admit for GI consult. [EE]   1907 EKG ordered for Inapsine usage.    Independently interpreted myself.  Time 1849.  Sinus rhythm, 96 bpm.  Normal P/MOIRA.  QRS normal with normal axis.  No significant ST abnormalities.  Normal QT interval.  Similar to previous EKG from 9/27/2024. [EE]      ED Course User Index  [EE] Anderson Zelaya PA       Plan for hospitalization given second ER visit today and ongoing symptoms, plan for hospitalization for symptom control and further monitoring.     MD Attestation Note    SHARED VISIT: This visit was performed by BOTH a physician and an APC. The  substantive portion of the medical decision making was performed by this attesting physician who made or approved the management plan and takes responsibility for patient management. All studies in the APC note (if performed) were independently interpreted by me.                   Aleks Corona MD  10/19/24 1955

## 2024-10-19 NOTE — H&P
HISTORY AND PHYSICAL   [facility]        Date of Admission: 10/19/2024  Patient Identification:  Name: Dariel Schilling  Age: 19 y.o.  Sex: female  :  2005  MRN: [id number]                     Primary Care Physician: System, Provider Not In    Chief Complaint:  19 year old female presented to the emergency room with abdominal pain; it has been present intermittently for the last two years; she has not identified anything that makes it better or worse; the pain is on the right side; she was seen earlier today for the same    History of Present Illness:   As above    Past Medical History:  Past Medical History:   Diagnosis Date    Allergic     Anemia     iron deficiency    Anxiety     Asthma     Chlamydia     Chronic cough 2024    Eczema     Seasonal allergies     Visual impairment      Past Surgical History:  Past Surgical History:   Procedure Laterality Date    COLONOSCOPY      WISDOM TOOTH EXTRACTION        Home Meds:  (Not in a hospital admission)      Allergies:  Allergies   Allergen Reactions    Pomegranate [Punica] Swelling     Immunizations:  Immunization History   Administered Date(s) Administered    COVID-19 (PFIZER) BIVALENT 12+YRS 2023    COVID-19 (PFIZER) Purple Cap Monovalent 2021, 2021, 2022    DTaP, Unspecified 2008    FluMist 2-49yrs (Nasal) 2008    Fluzone (or Fluarix & Flulaval for VFC) >6mos 2022, 2022    Hep A, 2 Dose 2008, 2009    Hep B, Unspecified 2004, 2004, 2004    Hepatitis B 2 Dose Vaccine Heplisav-B 05/10/2023    IPV 2008    Influenza Seasonal Injectable 10/13/2007    MMR 2005, 2008    PPD Test 2022    Pneumococcal Conjugate 20-Valent (PCV20) 2024    Tdap 2015    Varicella 2005, 2008     Social History:   Social History     Social History Narrative    Not on file     Social History     Socioeconomic History    Marital status: Single  "  Tobacco Use    Smoking status: Never    Smokeless tobacco: Never   Vaping Use    Vaping status: Never Used   Substance and Sexual Activity    Alcohol use: Never    Drug use: Never     Types: Marijuana    Sexual activity: Yes     Partners: Female     Birth control/protection: Condom, Patch       Family History:  Family History   Problem Relation Age of Onset    Breast cancer Maternal Great-Grandmother     Asthma Mother     Hyperlipidemia Mother     Hyperlipidemia Paternal Grandfather     Hyperlipidemia Paternal Grandmother     Vision loss Paternal Grandmother         Review of Systems  See history of present illness and past medical history.  Patient denies headache, dizziness, syncope, falls, trauma, change in vision, change in hearing, change in taste, changes in weight, changes in appetite, focal weakness, numbness, or paresthesia.  Patient denies chest pain, palpitations, dyspnea, orthopnea, PND, cough, sinus pressure, rhinorrhea, epistaxis, hemoptysis, nausea, vomiting,hematemesis, diarrhea, constipation or hematochezia.  Denies cold or heat intolerance, polydipsia, polyuria, polyphagia. Denies hematuria, pyuria, dysuria, hesitancy, frequency or urgency. Denies consumption of raw and under cooked meats foods or change in water source.  Denies fever, chills, sweats, night sweats.  Denies missing any routine medications. Remainder of ROS is negative.    Objective:  T Max 24 hrs: Temp (24hrs), Av.1 °F (37.8 °C), Min:99.7 °F (37.6 °C), Max:100.4 °F (38 °C)    Vitals Ranges:   Temp:  [99.7 °F (37.6 °C)-100.4 °F (38 °C)] 100.4 °F (38 °C)  Heart Rate:  [] 103  Resp:  [16-18] 16  BP: (106-118)/(70-81) 118/81      Exam:  /81   Pulse 103   Temp 100.4 °F (38 °C) (Tympanic)   Resp 16   Ht 165.1 cm (65\")   Wt 61.2 kg (135 lb)   LMP 10/13/2024 (Approximate)   SpO2 97%   BMI 22.47 kg/m²     General Appearance:    Alert, cooperative, no distress, appears stated age   Head:    Normocephalic, without " obvious abnormality, atraumatic   Eyes:    PERRL, conjunctivae/corneas clear, EOM's intact, both eyes   Ears:    Normal external ear canals, both ears   Nose:   Nares normal, septum midline, mucosa normal, no drainage    or sinus tenderness   Throat:   Lips, mucosa, and tongue normal   Neck:   Supple, symmetrical, trachea midline, no adenopathy;     thyroid:  no enlargement/tenderness/nodules; no carotid    bruit or JVD   Back:     Symmetric, no curvature, ROM normal, no CVA tenderness   Lungs:     Clear to auscultation bilaterally, respirations unlabored   Chest Wall:    No tenderness or deformity    Heart:    Regular rate and rhythm, S1 and S2 normal, no murmur, rub   or gallop   Abdomen:     Soft, nontender, bowel sounds active all four quadrants,     no masses, no hepatomegaly, no splenomegaly   Extremities:   Extremities normal, atraumatic, no cyanosis or edema                       .    Data Review:  Labs in chart were reviewed.  WBC   Date Value Ref Range Status   10/19/2024 11.99 (H) 3.40 - 10.80 10*3/mm3 Final     Hemoglobin   Date Value Ref Range Status   10/19/2024 12.0 12.0 - 15.9 g/dL Final     Hematocrit   Date Value Ref Range Status   10/19/2024 37.4 34.0 - 46.6 % Final     Platelets   Date Value Ref Range Status   10/19/2024 325 140 - 450 10*3/mm3 Final     Sodium   Date Value Ref Range Status   10/19/2024 136 136 - 145 mmol/L Final     Potassium   Date Value Ref Range Status   10/19/2024 3.6 3.5 - 5.2 mmol/L Final     Comment:     Slight hemolysis detected by analyzer. Result may be falsely elevated.     Chloride   Date Value Ref Range Status   10/19/2024 103 98 - 107 mmol/L Final     CO2   Date Value Ref Range Status   10/19/2024 22.2 22.0 - 29.0 mmol/L Final     BUN   Date Value Ref Range Status   10/19/2024 5 (L) 6 - 20 mg/dL Final     Creatinine   Date Value Ref Range Status   10/19/2024 0.79 0.57 - 1.00 mg/dL Final     Glucose   Date Value Ref Range Status   10/19/2024 94 65 - 99 mg/dL Final      Calcium   Date Value Ref Range Status   10/19/2024 9.0 8.6 - 10.5 mg/dL Final                Imaging Results (All)       None              Assessment:  Active Hospital Problems    Diagnosis  POA    **Abdominal pain [R10.9]  Yes      Resolved Hospital Problems   No resolved problems to display.   Asthma  Nausea and vomiting    Plan:  Will ask gi to see her  Pain control  Ct was done most recently at the end of September so will not repeat  Fluids  Dw ed provider      Maryam Dexter MD  10/19/2024  19:25 EDT

## 2024-10-19 NOTE — ED PROVIDER NOTES
EMERGENCY DEPARTMENT ENCOUNTER    Room Number:  04/04  Date of encounter:  10/19/2024  PCP: System, Provider Not In  Historian: Patient, family  Chronic or social conditions impacting care (social determinants of health): None    HPI:  Chief Complaint: Abdominal pain  A complete HPI/ROS/PMH/PSH/SH/FH are unobtainable due to: Nothing    Context: Dariel Schilling is a 19 y.o. female with a history of chronic abdominal pain, who presents to the ED c/o acute diffuse abdominal pain, nausea, vomiting.  Patient states she has been dealing with intermittent pain for the past 2 years.  Her pain today is diffusely on the right which is common for her.  She has been seen by GI, GYN, multiple ER visits without a definitive diagnosis or treatment.  The patient was actually seen in the ER earlier this morning for similar complaints.  She does complain of intermittent fever and chills.  She denies any dysuria, vaginal bleeding or discharge.    Patient was evaluated by hematology recently for concerns for porphyria.  Her levels were thought to be not high enough to be concerning for acute porphyria.  She has also been seen by GYN who considered endometriosis.      Review of prior external notes (non-ED):   I reviewed hematology office from 10/9/2024.  Patient being followed for abdominal pain.    Review of prior external test results outside of this encounter:  I reviewed a CMP from 9/27/2024.  Creatinine 0.66, potassium 3.9.  I reviewed a CT of the abdomen and pelvis from 9/27/2024.  This showed no significant intra-abdominal abnormalities.    PAST MEDICAL HISTORY  Active Ambulatory Problems     Diagnosis Date Noted    Left-sided weakness 10/04/2023    Routine health maintenance 05/08/2024    Chronic abdominal pain 05/29/2024    Seasonal allergies 05/29/2024    Nausea and vomiting 05/29/2024    Chronic chest pain 05/29/2024    Endometriosis 05/29/2024    Recurrent infections 07/09/2024    Marijuana use 07/09/2024    Asthma  07/09/2024     Resolved Ambulatory Problems     Diagnosis Date Noted    Chronic cough 05/29/2024     Past Medical History:   Diagnosis Date    Allergic     Anemia     Anxiety     Chlamydia     Eczema     Visual impairment          PAST SURGICAL HISTORY  Past Surgical History:   Procedure Laterality Date    COLONOSCOPY      WISDOM TOOTH EXTRACTION           FAMILY HISTORY  Family History   Problem Relation Age of Onset    Breast cancer Maternal Great-Grandmother     Asthma Mother     Hyperlipidemia Mother     Hyperlipidemia Paternal Grandfather     Hyperlipidemia Paternal Grandmother     Vision loss Paternal Grandmother          SOCIAL HISTORY  Social History     Socioeconomic History    Marital status: Single   Tobacco Use    Smoking status: Never    Smokeless tobacco: Never   Vaping Use    Vaping status: Never Used   Substance and Sexual Activity    Alcohol use: Never    Drug use: Never     Types: Marijuana    Sexual activity: Yes     Partners: Female     Birth control/protection: Condom, Patch         ALLERGIES  Pomegranate [punica]        REVIEW OF SYSTEMS  All systems reviewed and negative except for those discussed in HPI.       PHYSICAL EXAM    I have reviewed the triage vital signs and nursing notes.    ED Triage Vitals   Temp Heart Rate Resp BP SpO2   10/19/24 1559 10/19/24 1559 10/19/24 1559 10/19/24 1631 10/19/24 1559   100.4 °F (38 °C) (!) 150 18 118/81 98 %      Temp src Heart Rate Source Patient Position BP Location FiO2 (%)   10/19/24 1559 10/19/24 1559 -- -- --   Tympanic Monitor          Physical Exam  GENERAL: Alert, oriented, nontoxic-appearing, not distressed  HENT: head atraumatic, no nuchal rigidity  EYES: no scleral icterus, EOMI  CV: regular rhythm, tachycardic rate, no murmur  RESPIRATORY: normal effort, CTA  ABDOMEN: soft, moderate diffuse abdominal pain  MUSCULOSKELETAL: no deformity, FROM, no calf swelling or tenderness  NEURO: alert, moves all extremities, follows commands  SKIN: warm,  dry        LAB RESULTS  Recent Results (from the past 24 hours)   Comprehensive Metabolic Panel    Collection Time: 10/19/24  5:00 AM    Specimen: Blood   Result Value Ref Range    Glucose 97 65 - 99 mg/dL    BUN 6 6 - 20 mg/dL    Creatinine 0.67 0.57 - 1.00 mg/dL    Sodium 138 136 - 145 mmol/L    Potassium 3.6 3.5 - 5.2 mmol/L    Chloride 103 98 - 107 mmol/L    CO2 24.0 22.0 - 29.0 mmol/L    Calcium 9.4 8.6 - 10.5 mg/dL    Total Protein 7.1 6.0 - 8.5 g/dL    Albumin 4.0 3.5 - 5.2 g/dL    ALT (SGPT) 5 1 - 33 U/L    AST (SGOT) 9 1 - 32 U/L    Alkaline Phosphatase 67 39 - 117 U/L    Total Bilirubin 0.3 0.0 - 1.2 mg/dL    Globulin 3.1 gm/dL    A/G Ratio 1.3 g/dL    BUN/Creatinine Ratio 9.0 7.0 - 25.0    Anion Gap 11.0 5.0 - 15.0 mmol/L    eGFR 128.5 >60.0 mL/min/1.73   Lipase    Collection Time: 10/19/24  5:00 AM    Specimen: Blood   Result Value Ref Range    Lipase 15 13 - 60 U/L   hCG, Serum, Qualitative    Collection Time: 10/19/24  5:00 AM    Specimen: Blood   Result Value Ref Range    HCG Qualitative Negative Negative   CBC Auto Differential    Collection Time: 10/19/24  5:00 AM    Specimen: Blood   Result Value Ref Range    WBC 8.71 3.40 - 10.80 10*3/mm3    RBC 4.49 3.77 - 5.28 10*6/mm3    Hemoglobin 11.8 (L) 12.0 - 15.9 g/dL    Hematocrit 37.8 34.0 - 46.6 %    MCV 84.2 79.0 - 97.0 fL    MCH 26.3 (L) 26.6 - 33.0 pg    MCHC 31.2 (L) 31.5 - 35.7 g/dL    RDW 13.3 12.3 - 15.4 %    RDW-SD 40.8 37.0 - 54.0 fl    MPV 9.6 6.0 - 12.0 fL    Platelets 330 140 - 450 10*3/mm3    Neutrophil % 77.3 (H) 42.7 - 76.0 %    Lymphocyte % 13.3 (L) 19.6 - 45.3 %    Monocyte % 5.7 5.0 - 12.0 %    Eosinophil % 2.9 0.3 - 6.2 %    Basophil % 0.5 0.0 - 1.5 %    Immature Grans % 0.3 0.0 - 0.5 %    Neutrophils, Absolute 6.73 1.70 - 7.00 10*3/mm3    Lymphocytes, Absolute 1.16 0.70 - 3.10 10*3/mm3    Monocytes, Absolute 0.50 0.10 - 0.90 10*3/mm3    Eosinophils, Absolute 0.25 0.00 - 0.40 10*3/mm3    Basophils, Absolute 0.04 0.00 - 0.20 10*3/mm3     Immature Grans, Absolute 0.03 0.00 - 0.05 10*3/mm3    nRBC 0.0 0.0 - 0.2 /100 WBC   Light Blue Top    Collection Time: 10/19/24  5:07 AM   Result Value Ref Range    Extra Tube Hold for add-ons.    Urinalysis With Microscopic If Indicated (No Culture) - Urine, Clean Catch    Collection Time: 10/19/24  5:46 AM    Specimen: Urine, Clean Catch   Result Value Ref Range    Color, UA Yellow Yellow, Straw    Appearance, UA Clear Clear    pH, UA >=9.0 (H) 5.0 - 8.0    Specific Gravity, UA 1.006 1.005 - 1.030    Glucose, UA Negative Negative    Ketones, UA 15 mg/dL (1+) (A) Negative    Bilirubin, UA Negative Negative    Blood, UA Negative Negative    Protein, UA Negative Negative    Leuk Esterase, UA Small (1+) (A) Negative    Nitrite, UA Negative Negative    Urobilinogen, UA 1.0 E.U./dL 0.2 - 1.0 E.U./dL   Urinalysis, Microscopic Only - Urine, Clean Catch    Collection Time: 10/19/24  5:46 AM    Specimen: Urine, Clean Catch   Result Value Ref Range    RBC, UA 0-2 None Seen, 0-2 /HPF    WBC, UA 3-5 (A) None Seen, 0-2 /HPF    Bacteria, UA Trace (A) None Seen /HPF    Squamous Epithelial Cells, UA 7-12 (A) None Seen, 0-2 /HPF    Hyaline Casts, UA None Seen None Seen /LPF    Methodology Automated Microscopy    Comprehensive Metabolic Panel    Collection Time: 10/19/24  4:23 PM    Specimen: Blood   Result Value Ref Range    Glucose 94 65 - 99 mg/dL    BUN 5 (L) 6 - 20 mg/dL    Creatinine 0.79 0.57 - 1.00 mg/dL    Sodium 136 136 - 145 mmol/L    Potassium 3.6 3.5 - 5.2 mmol/L    Chloride 103 98 - 107 mmol/L    CO2 22.2 22.0 - 29.0 mmol/L    Calcium 9.0 8.6 - 10.5 mg/dL    Total Protein 7.0 6.0 - 8.5 g/dL    Albumin 4.0 3.5 - 5.2 g/dL    ALT (SGPT) 5 1 - 33 U/L    AST (SGOT) 9 1 - 32 U/L    Alkaline Phosphatase 62 39 - 117 U/L    Total Bilirubin 0.3 0.0 - 1.2 mg/dL    Globulin 3.0 gm/dL    A/G Ratio 1.3 g/dL    BUN/Creatinine Ratio 6.3 (L) 7.0 - 25.0    Anion Gap 10.8 5.0 - 15.0 mmol/L    eGFR 110.0 >60.0 mL/min/1.73   CBC Auto  Differential    Collection Time: 10/19/24  4:23 PM    Specimen: Blood   Result Value Ref Range    WBC 11.99 (H) 3.40 - 10.80 10*3/mm3    RBC 4.51 3.77 - 5.28 10*6/mm3    Hemoglobin 12.0 12.0 - 15.9 g/dL    Hematocrit 37.4 34.0 - 46.6 %    MCV 82.9 79.0 - 97.0 fL    MCH 26.6 26.6 - 33.0 pg    MCHC 32.1 31.5 - 35.7 g/dL    RDW 13.0 12.3 - 15.4 %    RDW-SD 39.3 37.0 - 54.0 fl    MPV 9.7 6.0 - 12.0 fL    Platelets 325 140 - 450 10*3/mm3    Neutrophil % 88.1 (H) 42.7 - 76.0 %    Lymphocyte % 6.4 (L) 19.6 - 45.3 %    Monocyte % 5.0 5.0 - 12.0 %    Eosinophil % 0.1 (L) 0.3 - 6.2 %    Basophil % 0.2 0.0 - 1.5 %    Immature Grans % 0.2 0.0 - 0.5 %    Neutrophils, Absolute 10.57 (H) 1.70 - 7.00 10*3/mm3    Lymphocytes, Absolute 0.77 0.70 - 3.10 10*3/mm3    Monocytes, Absolute 0.60 0.10 - 0.90 10*3/mm3    Eosinophils, Absolute 0.01 0.00 - 0.40 10*3/mm3    Basophils, Absolute 0.02 0.00 - 0.20 10*3/mm3    Immature Grans, Absolute 0.02 0.00 - 0.05 10*3/mm3    nRBC 0.0 0.0 - 0.2 /100 WBC   Respiratory Panel PCR w/COVID-19(SARS-CoV-2) [facility] In-House, NP Swab in [facility], 2 HR TAT - Swab, Nasopharynx    Collection Time: 10/19/24  4:23 PM    Specimen: Nasopharynx; Swab   Result Value Ref Range    ADENOVIRUS, PCR Not Detected Not Detected    Coronavirus 229E Not Detected Not Detected    Coronavirus HKU1 Not Detected Not Detected    Coronavirus NL63 Not Detected Not Detected    Coronavirus OC43 Not Detected Not Detected    COVID19 Not Detected Not Detected - Ref. Range    Human Metapneumovirus Not Detected Not Detected    Human Rhinovirus/Enterovirus Not Detected Not Detected    Influenza A PCR Not Detected Not Detected    Influenza B PCR Not Detected Not Detected    Parainfluenza Virus 1 Not Detected Not Detected    Parainfluenza Virus 2 Not Detected Not Detected    Parainfluenza Virus 3 Not Detected Not Detected    Parainfluenza Virus 4 Not Detected Not Detected    RSV, PCR Not Detected Not Detected    Bordetella  pertussis pcr Not Detected Not Detected    Bordetella parapertussis PCR Not Detected Not Detected    Chlamydophila pneumoniae PCR Not Detected Not Detected    Mycoplasma pneumo by PCR Not Detected Not Detected   ECG 12 Lead QT Measurement    Collection Time: 10/19/24  6:49 PM   Result Value Ref Range    QT Interval 332 ms    QTC Interval 420 ms       Ordered the above labs and independently reviewed the results.        RADIOLOGY  XR Chest 1 View    Result Date: 10/19/2024  XR CHEST 1 VW-  HISTORY: Female who is 19 years-old, short of breath  TECHNIQUE: Frontal view of the chest  COMPARISON: 9/27/2024  FINDINGS: Heart, mediastinum and pulmonary vasculature are unremarkable. No focal pulmonary consolidation, pleural effusion, or pneumothorax. No acute osseous process.      No evidence for acute pulmonary process. Follow-up as clinical indications persist.  This report was finalized on 10/19/2024 6:38 AM by Dr. Josh Samuels M.D on Workstation: [id number]       I ordered the above noted radiological studies. Reviewed by me and discussed with radiologist.  See dictation for official radiology interpretation.      MEDICATIONS GIVEN IN ER    Medications   sodium chloride 0.9 % flush 10 mL (has no administration in time range)   acetaminophen (TYLENOL) tablet 650 mg (has no administration in time range)     Or   acetaminophen (TYLENOL) 160 MG/5ML oral solution 650 mg (has no administration in time range)     Or   acetaminophen (TYLENOL) suppository 650 mg (has no administration in time range)   ondansetron (ZOFRAN) injection 4 mg (has no administration in time range)   sennosides-docusate (PERICOLACE) 8.6-50 MG per tablet 2 tablet (has no administration in time range)     And   polyethylene glycol (MIRALAX) packet 17 g (has no administration in time range)     And   bisacodyl (DULCOLAX) EC tablet 5 mg (has no administration in time range)     And   bisacodyl (DULCOLAX) suppository 10 mg (has no administration in time  range)   sodium chloride 0.9 % with KCl 20 mEq/L infusion (has no administration in time range)   morphine injection 4 mg (has no administration in time range)   morphine injection 4 mg (4 mg Intravenous Given 10/19/24 1628)   ondansetron (ZOFRAN) injection 4 mg (4 mg Intravenous Given 10/19/24 1628)   droperidol (INAPSINE) injection 2.5 mg (2.5 mg Intravenous Given 10/19/24 1837)         ADDITIONAL ORDERS CONSIDERED BUT NOT ORDERED:  Considered CT imaging however patient had a recent CT scan this week with no significant findings.    PROGRESS, DATA ANALYSIS, CONSULTS, AND MEDICAL DECISION MAKING    All labs have been independently interpreted by myself.  All radiology studies have been independently interpreted by myself and discussed with radiologist dictating the report.   EKGs independently interpreted by myself.  Discussion below represents my analysis of pertinent findings related to patient's condition, differential diagnosis, treatment plan and final disposition.    I have discussed case with Dr. Corona, emergency room physician.  He has performed his own bedside examination and agrees with treatment plan.    ED Course as of 10/19/24 1906   Sat Oct 19, 2024   1638 Patient presents with acute on chronic abdominal pain, nausea, vomiting, fever.  Patient seen here in the ER earlier this morning with a fairly benign workup.  Plan for repeat labs, pain control.  Will hold off on imaging barring abnormal lab work. [EE]   1644 WBC(!): 11.99 [EE]   1644 Hemoglobin: 12.0 [EE]   1806 COVID19: Not Detected [EE]   1821 Recheck of patient.  She states she is still having abdominal pain.  Inapsine ordered.  Plan to admit for GI consult. [EE]      ED Course User Index  [EE] Anderson Zelaya PA       AS OF 19:06 EDT VITALS:    BP - 118/81  HR - 103  TEMP - 100.4 °F (38 °C) (Tympanic)  O2 SATS - 97%        DIAGNOSIS  Final diagnoses:   Intractable abdominal pain         DISPOSITION  Admitted    Dictated utilizing Dragon  dictation     Anderson Zelaya PA  10/19/24 1907

## 2024-10-19 NOTE — DISCHARGE INSTRUCTIONS
We obtained labs for porphyria workup.  Follow-up with your hematologist oncologist for further eval

## 2024-10-19 NOTE — ED PROVIDER NOTES
EMERGENCY DEPARTMENT ENCOUNTER    Room Number:  12/12  PCP: System, Provider Not In  Patient Care Team:  System, Provider Not In as PCP - General  Yosef Hollis MD as Consulting Physician (Hematology and Oncology)  Beba Gaytan MD as Referring Physician (Internal Medicine)   Independent Historians: Patient    HPI:  Chief Complaint: Abdominal pain and headache   A complete HPI/ROS/PMH/PSH/SH/FH are unobtainable due to: None    Chronic or social conditions impacting patient care (Social Determinants of Health): None  (Financial Resource Strain / Food Insecurity / Transportation Needs / Physical Activity / Stress / Social Connections / Intimate Partner Violence / Housing Stability)    Context: Dariel Schilling is a 19 y.o. female who presents to the ED c/o acute abdominal pain and headache which has been going on for last couple days.  Patient has prior history of headaches abdominal pain without clear cause.  Patient reports some nausea vomiting.  No fevers or chills.  No chest pain.  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  They plan to check total porphyrin levels as well as porphobilinogen when having exacerbation.    Review of prior external notes (non-ED) -and- Review of prior external test results outside of this encounter: I reviewed patient's oncology note from 10/9/2024    Prescription drug monitoring program review:         PAST MEDICAL HISTORY  Active Ambulatory Problems     Diagnosis Date Noted    Left-sided weakness 10/04/2023    Routine health maintenance 05/08/2024    Chronic abdominal pain 05/29/2024    Seasonal allergies 05/29/2024    Nausea and vomiting 05/29/2024    Chronic chest pain 05/29/2024    Endometriosis 05/29/2024    Recurrent infections 07/09/2024    Marijuana use 07/09/2024    Asthma 07/09/2024    Abdominal pain 10/19/2024     Resolved Ambulatory Problems     Diagnosis Date Noted    Chronic cough 05/29/2024     Past Medical History:   Diagnosis  Date    Allergic     Anemia     Anxiety     Chlamydia     Eczema     Visual impairment          PAST SURGICAL HISTORY  Past Surgical History:   Procedure Laterality Date    COLONOSCOPY      WISDOM TOOTH EXTRACTION           FAMILY HISTORY  Family History   Problem Relation Age of Onset    Asthma Mother     Hyperlipidemia Mother     Asthma Father     Arthritis Maternal Grandmother     Cancer Maternal Grandfather     Cancer Paternal Grandmother     Hyperlipidemia Paternal Grandmother     Vision loss Paternal Grandmother     Hyperlipidemia Paternal Grandfather     Cancer Maternal Great-Grandmother     Breast cancer Maternal Great-Grandmother          SOCIAL HISTORY  Social History     Socioeconomic History    Marital status: Single   Tobacco Use    Smoking status: Never    Smokeless tobacco: Never   Vaping Use    Vaping status: Never Used   Substance and Sexual Activity    Alcohol use: Never    Drug use: Never     Types: Marijuana     Comment: every other day    Sexual activity: Yes     Partners: Female     Birth control/protection: Condom, Patch         ALLERGIES  Pomegranate [punica]        REVIEW OF SYSTEMS  Review of Systems  Included in HPI  All systems reviewed and negative except for those discussed in HPI.      PHYSICAL EXAM    I have reviewed the triage vital signs and nursing notes.    ED Triage Vitals   Temp Heart Rate Resp BP SpO2   10/19/24 0430 10/19/24 0430 10/19/24 0430 10/19/24 0431 10/19/24 0430   99.7 °F (37.6 °C) 105 16 106/73 100 %      Temp src Heart Rate Source Patient Position BP Location FiO2 (%)   -- -- -- -- --              Physical Exam  GENERAL: alert, moderate acute distress  SKIN: Warm, dry  HENT: Normocephalic, atraumatic  EYES: no scleral icterus  CV: regular rhythm, regular rate  RESPIRATORY: normal effort, lungs clear  ABDOMEN: soft, nontender, nondistended  MUSCULOSKELETAL: no deformity  NEURO: alert, moves all extremities, follows commands                                                                LAB RESULTS  Recent Results (from the past 24 hours)   Comprehensive Metabolic Panel    Collection Time: 10/19/24  4:23 PM    Specimen: Blood   Result Value Ref Range    Glucose 94 65 - 99 mg/dL    BUN 5 (L) 6 - 20 mg/dL    Creatinine 0.79 0.57 - 1.00 mg/dL    Sodium 136 136 - 145 mmol/L    Potassium 3.6 3.5 - 5.2 mmol/L    Chloride 103 98 - 107 mmol/L    CO2 22.2 22.0 - 29.0 mmol/L    Calcium 9.0 8.6 - 10.5 mg/dL    Total Protein 7.0 6.0 - 8.5 g/dL    Albumin 4.0 3.5 - 5.2 g/dL    ALT (SGPT) 5 1 - 33 U/L    AST (SGOT) 9 1 - 32 U/L    Alkaline Phosphatase 62 39 - 117 U/L    Total Bilirubin 0.3 0.0 - 1.2 mg/dL    Globulin 3.0 gm/dL    A/G Ratio 1.3 g/dL    BUN/Creatinine Ratio 6.3 (L) 7.0 - 25.0    Anion Gap 10.8 5.0 - 15.0 mmol/L    eGFR 110.0 >60.0 mL/min/1.73   CBC Auto Differential    Collection Time: 10/19/24  4:23 PM    Specimen: Blood   Result Value Ref Range    WBC 11.99 (H) 3.40 - 10.80 10*3/mm3    RBC 4.51 3.77 - 5.28 10*6/mm3    Hemoglobin 12.0 12.0 - 15.9 g/dL    Hematocrit 37.4 34.0 - 46.6 %    MCV 82.9 79.0 - 97.0 fL    MCH 26.6 26.6 - 33.0 pg    MCHC 32.1 31.5 - 35.7 g/dL    RDW 13.0 12.3 - 15.4 %    RDW-SD 39.3 37.0 - 54.0 fl    MPV 9.7 6.0 - 12.0 fL    Platelets 325 140 - 450 10*3/mm3    Neutrophil % 88.1 (H) 42.7 - 76.0 %    Lymphocyte % 6.4 (L) 19.6 - 45.3 %    Monocyte % 5.0 5.0 - 12.0 %    Eosinophil % 0.1 (L) 0.3 - 6.2 %    Basophil % 0.2 0.0 - 1.5 %    Immature Grans % 0.2 0.0 - 0.5 %    Neutrophils, Absolute 10.57 (H) 1.70 - 7.00 10*3/mm3    Lymphocytes, Absolute 0.77 0.70 - 3.10 10*3/mm3    Monocytes, Absolute 0.60 0.10 - 0.90 10*3/mm3    Eosinophils, Absolute 0.01 0.00 - 0.40 10*3/mm3    Basophils, Absolute 0.02 0.00 - 0.20 10*3/mm3    Immature Grans, Absolute 0.02 0.00 - 0.05 10*3/mm3    nRBC 0.0 0.0 - 0.2 /100 WBC   Respiratory Panel PCR w/COVID-19(SARS-CoV-2) [facility] In-House, NP Swab in [facility], 2 HR TAT - Swab, Nasopharynx    Collection Time: 10/19/24   4:23 PM    Specimen: Nasopharynx; Swab   Result Value Ref Range    ADENOVIRUS, PCR Not Detected Not Detected    Coronavirus 229E Not Detected Not Detected    Coronavirus HKU1 Not Detected Not Detected    Coronavirus NL63 Not Detected Not Detected    Coronavirus OC43 Not Detected Not Detected    COVID19 Not Detected Not Detected - Ref. Range    Human Metapneumovirus Not Detected Not Detected    Human Rhinovirus/Enterovirus Not Detected Not Detected    Influenza A PCR Not Detected Not Detected    Influenza B PCR Not Detected Not Detected    Parainfluenza Virus 1 Not Detected Not Detected    Parainfluenza Virus 2 Not Detected Not Detected    Parainfluenza Virus 3 Not Detected Not Detected    Parainfluenza Virus 4 Not Detected Not Detected    RSV, PCR Not Detected Not Detected    Bordetella pertussis pcr Not Detected Not Detected    Bordetella parapertussis PCR Not Detected Not Detected    Chlamydophila pneumoniae PCR Not Detected Not Detected    Mycoplasma pneumo by PCR Not Detected Not Detected   ECG 12 Lead QT Measurement    Collection Time: 10/19/24  6:49 PM   Result Value Ref Range    QT Interval 332 ms    QTC Interval 420 ms   Basic Metabolic Panel    Collection Time: 10/20/24  4:05 AM    Specimen: Blood   Result Value Ref Range    Glucose 108 (H) 65 - 99 mg/dL    BUN 5 (L) 6 - 20 mg/dL    Creatinine 0.60 0.57 - 1.00 mg/dL    Sodium 136 136 - 145 mmol/L    Potassium 3.8 3.5 - 5.2 mmol/L    Chloride 105 98 - 107 mmol/L    CO2 19.5 (L) 22.0 - 29.0 mmol/L    Calcium 8.8 8.6 - 10.5 mg/dL    BUN/Creatinine Ratio 8.3 7.0 - 25.0    Anion Gap 11.5 5.0 - 15.0 mmol/L    eGFR 132.0 >60.0 mL/min/1.73   CBC Auto Differential    Collection Time: 10/20/24  4:05 AM    Specimen: Blood   Result Value Ref Range    WBC 16.49 (H) 3.40 - 10.80 10*3/mm3    RBC 4.07 3.77 - 5.28 10*6/mm3    Hemoglobin 10.8 (L) 12.0 - 15.9 g/dL    Hematocrit 32.9 (L) 34.0 - 46.6 %    MCV 80.8 79.0 - 97.0 fL    MCH 26.5 (L) 26.6 - 33.0 pg    MCHC 32.8 31.5  - 35.7 g/dL    RDW 13.1 12.3 - 15.4 %    RDW-SD 37.8 37.0 - 54.0 fl    MPV 10.0 6.0 - 12.0 fL    Platelets 325 140 - 450 10*3/mm3    Neutrophil % 86.6 (H) 42.7 - 76.0 %    Lymphocyte % 6.2 (L) 19.6 - 45.3 %    Monocyte % 6.5 5.0 - 12.0 %    Eosinophil % 0.0 (L) 0.3 - 6.2 %    Basophil % 0.2 0.0 - 1.5 %    Immature Grans % 0.5 0.0 - 0.5 %    Neutrophils, Absolute 14.28 (H) 1.70 - 7.00 10*3/mm3    Lymphocytes, Absolute 1.02 0.70 - 3.10 10*3/mm3    Monocytes, Absolute 1.07 (H) 0.10 - 0.90 10*3/mm3    Eosinophils, Absolute 0.00 0.00 - 0.40 10*3/mm3    Basophils, Absolute 0.03 0.00 - 0.20 10*3/mm3    Immature Grans, Absolute 0.09 (H) 0.00 - 0.05 10*3/mm3    nRBC 0.0 0.0 - 0.2 /100 WBC       I ordered the above labs and independently reviewed the results.        RADIOLOGY  No Radiology Exams Resulted Within Past 24 Hours    I ordered the above noted radiological studies. Reviewed by me and discussed with radiologist.  See dictation for official radiology interpretation.      PROCEDURES    Procedures      MEDICATIONS GIVEN IN ER    Medications   morphine injection 4 mg (4 mg Intravenous Given 10/19/24 0459)   ondansetron (ZOFRAN) injection 4 mg (4 mg Intravenous Given 10/19/24 0458)   sodium chloride 0.9 % bolus 500 mL (0 mL Intravenous Stopped 10/19/24 0640)   ketorolac (TORADOL) injection 15 mg (15 mg Intravenous Given 10/19/24 0546)   metoclopramide (REGLAN) injection 10 mg (10 mg Intravenous Given 10/19/24 0546)   diphenhydrAMINE (BENADRYL) injection 25 mg (25 mg Intravenous Given 10/19/24 0546)         ORDERS PLACED DURING THIS VISIT:  Orders Placed This Encounter   Procedures    XR Chest 1 View    Comprehensive Metabolic Panel    Lipase    Urinalysis With Microscopic If Indicated (No Culture) - Urine, Clean Catch    hCG, Serum, Qualitative    Porphyrin, Total    Porphobilinogen, Qn, Random Ur - Urine, Clean Catch    CBC Auto Differential    Urinalysis, Microscopic Only - Urine, Clean Catch    Monitor Blood Pressure     Continuous Pulse Oximetry    CBC & Differential    Extra Tubes    Light Blue Top         PROGRESS, DATA ANALYSIS, CONSULTS, AND MEDICAL DECISION MAKING    All labs have been independently interpreted by me.  All radiology studies have been reviewed by me and discussed with radiologist dictating the report.   EKG's independently viewed and interpreted by me.  Discussion below represents my analysis of pertinent findings related to patient's condition, differential diagnosis, treatment plan and final disposition.    Differential diagnosis includes but is not limited to:  Gastritis, gastroenteritis, peptic ulcer disease, GERD, esophageal perforation, acute appendicitis, mesenteric adenitis, Meckel’s diverticulum, epiploic appendagitis, diverticulitis, colon cancer, ulcerative colitis, Crohn’s disease, intussusception, small bowel obstruction, adhesions, ischemic bowel, perforated viscus, ileus, obstipation, biliary colic, cholecystitis, cholelithiasis, Spencer-Aaron Alonso, hepatitis, pancreatitis, common bile duct obstruction, cholangitis, bile leak, splenic trauma, splenic rupture, splenic infarction, splenic abscess, abdominal abscess, ascites, spontaneous bacterial peritonitis, hernia, UTI, cystitis, prostatitis, ureterolithiasis, urinary obstruction, ovarian cyst, torsion, pregnancy, ectopic pregnancy, PID, pelvic abscess, mittelschmerz, endometriosis, AAA, myocardial infarction, pneumonia, cancer, porphyria, DKA, medications, sickle cell, viral syndrome, zoster  .    Clinical Scores:              ED Course as of 10/20/24 0627   Sat Oct 19, 2024   0523 WBC: 8.71 [TJ]   0523 Hemoglobin(!): 11.8 [TJ]   0523 Platelets: 330 [TJ]   0542 Reassessment: Patient's abdominal pain has improved with IV pain medicine.  Patient reports her headache has slightly improved but still persistent.  We will treat with a migraine cocktail. [TJ]   0623 I have independently reviewed patient's chest x-ray; my interpretation is no  infiltrate no pneumothorax [TJ]   0626 Reassessment: Patient states that her headache has resolved with migraine cocktail.  States that she feels sleepy.  Denying chest pain or shortness of breath at this time. [TJ]   0659 Care to Dr. Sanchez.  Pending hCG results. [TJ]   0748 07:49 EDT  hCG results negative.  Patient comfortable will be discharged home [SL]      ED Course User Index  [SL] Adrian Sanchez MD  [TJ] Humberto Morales MD             PPE: The patient wore a mask and I wore an N95 mask throughout the entire patient encounter.       AS OF 06:27 EDT VITALS:    BP - 112/72  HR - 89  TEMP - 99.7 °F (37.6 °C)  O2 SATS - 99%        DIAGNOSIS  Final diagnoses:   Generalized abdominal pain   Acute nonintractable headache, unspecified headache type         DISPOSITION  ED Disposition       ED Disposition   Discharge    Condition   Stable    Comment   --                  Note Disclaimer: At [facility], we believe that sharing information builds trust and better relationships. You are receiving this note because you recently visited [facility]. It is possible you will see health information before a provider has talked with you about it. This kind of information can be easy to misunderstand. To help you fully understand what it means for your health, we urge you to discuss this note with your provider.         Humberto Morales MD  10/19/24 0700       Humberto Morales MD  10/20/24 0633

## 2024-10-19 NOTE — LETTER
October 24, 2024     Patient: Dariel Schilling   YOB: 2005   Date of Visit: 10/19/2024       To Whom It May Concern:     Dariel Schilling .was a patient in the hospital from October 19,2024 until October 24,2024.     Sincerely,  Sabine MIMS RN  [facility]      No name on file    CC: No Recipients

## 2024-10-20 ENCOUNTER — APPOINTMENT (OUTPATIENT)
Dept: ULTRASOUND IMAGING | Facility: HOSPITAL | Age: 19
End: 2024-10-20

## 2024-10-20 PROBLEM — R65.10 SIRS (SYSTEMIC INFLAMMATORY RESPONSE SYNDROME): Status: ACTIVE | Noted: 2024-10-20

## 2024-10-20 PROBLEM — R10.9 INTRACTABLE ABDOMINAL PAIN: Status: ACTIVE | Noted: 2024-10-19

## 2024-10-20 LAB
AMPHET+METHAMPHET UR QL: NEGATIVE
ANION GAP SERPL CALCULATED.3IONS-SCNC: 11.5 MMOL/L (ref 5–15)
BARBITURATES UR QL SCN: NEGATIVE
BASOPHILS # BLD AUTO: 0.03 10*3/MM3 (ref 0–0.2)
BASOPHILS NFR BLD AUTO: 0.2 % (ref 0–1.5)
BENZODIAZ UR QL SCN: NEGATIVE
BUN SERPL-MCNC: 5 MG/DL (ref 6–20)
BUN/CREAT SERPL: 8.3 (ref 7–25)
CALCIUM SPEC-SCNC: 8.8 MG/DL (ref 8.6–10.5)
CANNABINOIDS SERPL QL: POSITIVE
CHLORIDE SERPL-SCNC: 105 MMOL/L (ref 98–107)
CO2 SERPL-SCNC: 19.5 MMOL/L (ref 22–29)
COCAINE UR QL: NEGATIVE
CREAT SERPL-MCNC: 0.6 MG/DL (ref 0.57–1)
DEPRECATED RDW RBC AUTO: 37.8 FL (ref 37–54)
EGFRCR SERPLBLD CKD-EPI 2021: 132 ML/MIN/1.73
EOSINOPHIL # BLD AUTO: 0 10*3/MM3 (ref 0–0.4)
EOSINOPHIL NFR BLD AUTO: 0 % (ref 0.3–6.2)
ERYTHROCYTE [DISTWIDTH] IN BLOOD BY AUTOMATED COUNT: 13.1 % (ref 12.3–15.4)
FENTANYL UR-MCNC: NEGATIVE NG/ML
GLUCOSE SERPL-MCNC: 108 MG/DL (ref 65–99)
HCT VFR BLD AUTO: 32.9 % (ref 34–46.6)
HGB BLD-MCNC: 10.8 G/DL (ref 12–15.9)
IMM GRANULOCYTES # BLD AUTO: 0.09 10*3/MM3 (ref 0–0.05)
IMM GRANULOCYTES NFR BLD AUTO: 0.5 % (ref 0–0.5)
LYMPHOCYTES # BLD AUTO: 1.02 10*3/MM3 (ref 0.7–3.1)
LYMPHOCYTES NFR BLD AUTO: 6.2 % (ref 19.6–45.3)
MCH RBC QN AUTO: 26.5 PG (ref 26.6–33)
MCHC RBC AUTO-ENTMCNC: 32.8 G/DL (ref 31.5–35.7)
MCV RBC AUTO: 80.8 FL (ref 79–97)
METHADONE UR QL SCN: NEGATIVE
MONOCYTES # BLD AUTO: 1.07 10*3/MM3 (ref 0.1–0.9)
MONOCYTES NFR BLD AUTO: 6.5 % (ref 5–12)
NEUTROPHILS NFR BLD AUTO: 14.28 10*3/MM3 (ref 1.7–7)
NEUTROPHILS NFR BLD AUTO: 86.6 % (ref 42.7–76)
NRBC BLD AUTO-RTO: 0 /100 WBC (ref 0–0.2)
OPIATES UR QL: POSITIVE
OXYCODONE UR QL SCN: NEGATIVE
PLATELET # BLD AUTO: 325 10*3/MM3 (ref 140–450)
PMV BLD AUTO: 10 FL (ref 6–12)
POTASSIUM SERPL-SCNC: 3.8 MMOL/L (ref 3.5–5.2)
QT INTERVAL: 332 MS
QTC INTERVAL: 420 MS
RBC # BLD AUTO: 4.07 10*6/MM3 (ref 3.77–5.28)
SODIUM SERPL-SCNC: 136 MMOL/L (ref 136–145)
WBC NRBC COR # BLD AUTO: 16.49 10*3/MM3 (ref 3.4–10.8)

## 2024-10-20 PROCEDURE — 87591 N.GONORRHOEAE DNA AMP PROB: CPT | Performed by: INTERNAL MEDICINE

## 2024-10-20 PROCEDURE — G0378 HOSPITAL OBSERVATION PER HR: HCPCS

## 2024-10-20 PROCEDURE — 80048 BASIC METABOLIC PNL TOTAL CA: CPT | Performed by: INTERNAL MEDICINE

## 2024-10-20 PROCEDURE — 96375 TX/PRO/DX INJ NEW DRUG ADDON: CPT

## 2024-10-20 PROCEDURE — 99204 OFFICE O/P NEW MOD 45 MIN: CPT | Performed by: INTERNAL MEDICINE

## 2024-10-20 PROCEDURE — 36415 COLL VENOUS BLD VENIPUNCTURE: CPT | Performed by: INTERNAL MEDICINE

## 2024-10-20 PROCEDURE — 87491 CHLMYD TRACH DNA AMP PROBE: CPT | Performed by: INTERNAL MEDICINE

## 2024-10-20 PROCEDURE — 87040 BLOOD CULTURE FOR BACTERIA: CPT | Performed by: INTERNAL MEDICINE

## 2024-10-20 PROCEDURE — 76705 ECHO EXAM OF ABDOMEN: CPT

## 2024-10-20 PROCEDURE — 85025 COMPLETE CBC W/AUTO DIFF WBC: CPT | Performed by: INTERNAL MEDICINE

## 2024-10-20 PROCEDURE — 25010000002 MORPHINE PER 10 MG: Performed by: INTERNAL MEDICINE

## 2024-10-20 PROCEDURE — 96376 TX/PRO/DX INJ SAME DRUG ADON: CPT

## 2024-10-20 RX ORDER — HYDROCODONE BITARTRATE AND ACETAMINOPHEN 5; 325 MG/1; MG/1
1 TABLET ORAL EVERY 4 HOURS PRN
Status: DISCONTINUED | OUTPATIENT
Start: 2024-10-20 | End: 2024-10-24 | Stop reason: HOSPADM

## 2024-10-20 RX ADMIN — HYDROCODONE BITARTRATE AND ACETAMINOPHEN 1 TABLET: 5; 325 TABLET ORAL at 15:53

## 2024-10-20 RX ADMIN — PANTOPRAZOLE SODIUM 40 MG: 40 INJECTION, POWDER, FOR SOLUTION INTRAVENOUS at 09:20

## 2024-10-20 RX ADMIN — MORPHINE SULFATE 2 MG: 2 INJECTION, SOLUTION INTRAMUSCULAR; INTRAVENOUS at 03:20

## 2024-10-20 RX ADMIN — PANTOPRAZOLE SODIUM 40 MG: 40 INJECTION, POWDER, FOR SOLUTION INTRAVENOUS at 18:14

## 2024-10-20 RX ADMIN — ACETAMINOPHEN 325MG 650 MG: 325 TABLET ORAL at 09:20

## 2024-10-20 RX ADMIN — MONTELUKAST SODIUM 10 MG: 10 TABLET, FILM COATED ORAL at 21:17

## 2024-10-20 NOTE — PROGRESS NOTES
[facility] Medicine Services  PROGRESS NOTE    Patient Name: Dariel Schilling  : 2005  MRN: [id number]    Date of Admission: 10/19/2024  Primary Care Physician: System, Provider Not In    Subjective   Subjective     CC:  Follow-up stomach pain and nausea vomiting    Subjective: Patient feels a little better this morning.  She says her pain is mostly been in the right upper quadrant.  She says her nausea and vomiting improved with Zofran.    Review of Systems  No current shortness of breath or cough  No current chest pain or palpitations       Objective   Objective     Vital Signs:   Temp:  [97.7 °F (36.5 °C)-100.4 °F (38 °C)] 97.7 °F (36.5 °C)  Heart Rate:  [] 102  Resp:  [14-18] 16  BP: ()/(62-83) 107/70        Physical Exam:  Constitutional:Awake, alert  HENT: NCAT, mucous membranes moist, neck supple  Respiratory: No cough or wheezes, normal respirations, nonlabored breathing   Cardiovascular: Pulse rate is tachycardic, palpable radial pulses  Gastrointestinal:  soft, right upper quadrant tenderness, nondistended  Musculoskeletal: Relatively normal musculature for age, BMI is 22, no lower extremity edema  Psychiatric: Somewhat anxious affect, cooperative, conversational  Neurologic: No slurred speech or facial droop, follows commands  Skin: No rashes or jaundice, warm      Results Reviewed:  Results from last 7 days   Lab Units 10/20/24  0405 10/19/24  1623 10/19/24  0500   WBC 10*3/mm3 16.49* 11.99* 8.71   HEMOGLOBIN g/dL 10.8* 12.0 11.8*   HEMATOCRIT % 32.9* 37.4 37.8   PLATELETS 10*3/mm3 325 325 330     Results from last 7 days   Lab Units 10/20/24  0405 10/19/24  1623 10/19/24  0500   SODIUM mmol/L 136 136 138   POTASSIUM mmol/L 3.8 3.6 3.6   CHLORIDE mmol/L 105 103 103   CO2 mmol/L 19.5* 22.2 24.0   BUN mg/dL 5* 5* 6   CREATININE mg/dL 0.60 0.79 0.67   GLUCOSE mg/dL 108* 94 97   CALCIUM mg/dL 8.8 9.0 9.4   ALK PHOS U/L  --  62 67   ALT (SGPT) U/L  --  5 5   AST (SGOT) U/L  --  9 9      Estimated Creatinine Clearance: 144.5 mL/min (by C-G formula based on SCr of 0.6 mg/dL).    Microbiology Results Abnormal       Procedure Component Value - Date/Time    Respiratory Panel PCR w/COVID-19(SARS-CoV-2) [facility]GI/[facility] In-House, NP Swab in UTM/VTM, 2 HR TAT - Swab, Nasopharynx [[id number]]  (Normal) Collected: 10/19/24 1623    Lab Status: Final result Specimen: Swab from Nasopharynx Updated: 10/19/24 8837     ADENOVIRUS, PCR Not Detected     Coronavirus 229E Not Detected     Coronavirus HKU1 Not Detected     Coronavirus NL63 Not Detected     Coronavirus OC43 Not Detected     COVID19 Not Detected     Human Metapneumovirus Not Detected     Human Rhinovirus/Enterovirus Not Detected     Influenza A PCR Not Detected     Influenza B PCR Not Detected     Parainfluenza Virus 1 Not Detected     Parainfluenza Virus 2 Not Detected     Parainfluenza Virus 3 Not Detected     Parainfluenza Virus 4 Not Detected     RSV, PCR Not Detected     Bordetella pertussis pcr Not Detected     Bordetella parapertussis PCR Not Detected     Chlamydophila pneumoniae PCR Not Detected     Mycoplasma pneumo by PCR Not Detected    Narrative:      In the setting of a positive respiratory panel with a viral infection PLUS a negative procalcitonin without other underlying concern for bacterial infection, consider observing off antibiotics or discontinuation of antibiotics and continue supportive care. If the respiratory panel is positive for atypical bacterial infection (Bordetella pertussis, Chlamydophila pneumoniae, or Mycoplasma pneumoniae), consider antibiotic de-escalation to target atypical bacterial infection.            Imaging Results (Last 24 Hours)       ** No results found for the last 24 hours. **                I have reviewed the medications:  Scheduled Meds:montelukast, 10 mg, Oral, Nightly  pantoprazole, 40 mg, Intravenous, BID AC      Continuous Infusions:sodium chloride 0.9 % with KCl 20 mEq, 100 mL/hr, Last  Rate: 100 mL/hr (10/19/24 1941)      PRN Meds:.  acetaminophen **OR** acetaminophen **OR** acetaminophen    albuterol    senna-docusate sodium **AND** polyethylene glycol **AND** bisacodyl **AND** bisacodyl    HYDROcodone-acetaminophen    Morphine    ondansetron    [COMPLETED] Insert Peripheral IV **AND** sodium chloride    Assessment & Plan   Assessment & Plan     Active Hospital Problems    Diagnosis  POA    SIRS (systemic inflammatory response syndrome) [R65.10]  Yes    Intractable abdominal pain [R10.9]  Yes    Nausea and vomiting [R11.2]  Yes    Chronic abdominal pain [R10.9, G89.29]  Yes      Resolved Hospital Problems   No resolved problems to display.        Brief Hospital Course to date:  Dariel Schilling is a 19 y.o. female presents to the hospital with SIRS and acute on chronic upper abdominal pain associated with nausea and vomiting.    Discussion/plan for today: GI consulted.  Possible ulcer or gallbladder disease or other etiology is possible.  Previous records reviewed and patient previously had study positive for chlamydia.  Plan to recheck urine studies for chlamydia and gonorrhea and treat if positive.  Urine pregnancy test reviewed and is negative.  Check UDS.  Check abdominal ultrasound.  Monitor carefully while requiring IV pain medicine.  Transition to oral pain medicine as soon as possible.  Albuterol and Singulair for asthma.  IV fluid for dehydration.  Send blood culture.  Chest x-ray ordered and reviewed and no infiltrate.  Treatment plan discussed with patient is agreement.    DVT Prophylaxis: Ambulatory, mechanical      Disposition: Home potentially 1 to 2 days pending clinical course and results    CODE STATUS:   Code Status and Medical Interventions: CPR (Attempt to Resuscitate); Full Support   Ordered at: 10/19/24 1291     Code Status (Patient has no pulse and is not breathing):    CPR (Attempt to Resuscitate)     Medical Interventions (Patient has pulse or is breathing):    Full  Support       Jono Mijares MD  10/20/24     negative - no cough

## 2024-10-20 NOTE — CONSULTS
[facility]  Initial Inpatient Consult Note    Referring Provider: Maryam Dexter MD    Reason for Consultation: Nausea and vomiting, epigastric and right upper quadrant abdominal pain    Subjective     History of present illness:    Patient is a very pleasant 19-year-old -American female.  For the past 2 years she has been having postprandial nausea and vomiting upper abdominal pain primarily felt in the epigastric and right upper quadrant of the abdomen.  The symptoms were intermittent in the past but lately have become more or less on a daily basis.  Patient is not able to quantify any weight loss.  The pain is bad enough to wake her up at nighttime especially in the early hours of the morning on a frequent basis. There is no history of overt GI bleed.  She denies any extraintestinal symptoms.  There is no change in her bowel habits or any history of diarrhea.  He apparently had an upper endoscopy and a colonoscopy at [facility] last year which were reportedly normal .    Patient was seen in the emergency room at [facility] couple days ago and was sent home.  She returned next day and was thus admitted.    Patient works full-time at [employer].  She does not smoke or drink alcohol.  She also does not use OTC NSAIDs.  She denies any unusual stress, anxiety or interpersonal or work-related problems.    Her mother has had gallbladder disease and surgery.    Past Medical History:  Past Medical History:   Diagnosis Date    Allergic     Anemia     iron deficiency    Anxiety     Asthma     Chlamydia     Chronic cough 05/29/2024    Eczema     Seasonal allergies     Visual impairment      Past Surgical History:  Past Surgical History:   Procedure Laterality Date    COLONOSCOPY      WISDOM TOOTH EXTRACTION        Social History:   Social History     Tobacco Use    Smoking status: Never    Smokeless tobacco: Never   Substance Use Topics    Alcohol use: Never      Family History:  Family History    Problem Relation Age of Onset    Asthma Mother     Hyperlipidemia Mother     Asthma Father     Arthritis Maternal Grandmother     Cancer Maternal Grandfather     Cancer Paternal Grandmother     Hyperlipidemia Paternal Grandmother     Vision loss Paternal Grandmother     Hyperlipidemia Paternal Grandfather     Cancer Maternal Great-Grandmother     Breast cancer Maternal Great-Grandmother        Home Meds:  Medications Prior to Admission   Medication Sig Dispense Refill Last Dose/Taking    albuterol sulfate  (90 Base) MCG/ACT inhaler Inhale 2 puffs 4 (Four) Times a Day.   Taking    azelastine (ASTELIN) 0.1 % nasal spray 2 sprays into the nostril(s) as directed by provider 2 (Two) Times a Day. (Patient taking differently: Administer 2 sprays into the nostril(s) as directed by provider 2 (Two) Times a Day As Needed for Rhinitis or Allergies.) 30 mL 6 Taking Differently    Banophen 25 MG capsule Take 1 capsule by mouth 2 (Two) Times a Day.   Taking    cetirizine (zyrTEC) 5 MG tablet Take 1 tablet by mouth Every Morning.   Taking    Denta 5000 Plus 1.1 % cream Take 1 dose by mouth Every Night.   Taking    montelukast (Singulair) 10 MG tablet Take 1 tablet by mouth Every Night. 30 tablet 3 Taking    ondansetron ODT (ZOFRAN-ODT) 4 MG disintegrating tablet Take 1 tablet by mouth 4 (Four) Times a Day As Needed for Nausea or Vomiting. 15 tablet 0 Taking As Needed    norelgestromin-ethinyl estradiol (ORTHO EVRA) 150-35 MCG/24HR Place 1 patch on the skin as directed by provider 1 (One) Time Per Week for 90 days. (Patient taking differently: Place 1 patch on the skin as directed by provider 1 (One) Time Per Week. Changes friday) 12 patch 3     Scopolamine 1 MG/3DAYS patch Place 1 patch on the skin as directed by provider Every 72 (Seventy-Two) Hours. (Patient not taking: Reported on 10/9/2024) 30 each 0      Current Meds:   montelukast, 10 mg, Oral, Nightly  pantoprazole, 40 mg, Intravenous, BID  AC      Allergies:  Allergies   Allergen Reactions    Pomegranate [[place]] Swelling     Review of Systems  Pertinent items are noted in HPI     Objective     Vital Signs  Temp:  [97.7 °F (36.5 °C)-100.4 °F (38 °C)] 97.7 °F (36.5 °C)  Heart Rate:  [] 102  Resp:  [14-18] 16  BP: ()/(62-83) 107/70  Physical Exam:  General Appearance:    Alert, cooperative, in no acute distress   Head:    Normocephalic, without obvious abnormality, atraumatic   Eyes:          conjunctivae and sclerae normal, no   icterus   Throat:   no thrush, oral mucosa moist   Neck:   Supple, no adenopathy   Lungs:     Clear to auscultation bilaterally    Heart:    Regular rhythm and normal rate    Chest Wall:    No abnormalities observed   Abdomen:     Soft, nondistended, nontender; normal bowel sounds   Extremities:   no edema, no redness   Skin:   No bruising or rash   Psychiatric:  normal mood and insight     Results Review:   I reviewed the patient's new clinical results.    Results from last 7 days   Lab Units 10/20/24  0405 10/19/24  1623 10/19/24  0500   WBC 10*3/mm3 16.49* 11.99* 8.71   HEMOGLOBIN g/dL 10.8* 12.0 11.8*   HEMATOCRIT % 32.9* 37.4 37.8   PLATELETS 10*3/mm3 325 325 330     Results from last 7 days   Lab Units 10/20/24  0405 10/19/24  1623 10/19/24  0500   SODIUM mmol/L 136 136 138   POTASSIUM mmol/L 3.8 3.6 3.6   CHLORIDE mmol/L 105 103 103   CO2 mmol/L 19.5* 22.2 24.0   BUN mg/dL 5* 5* 6   CREATININE mg/dL 0.60 0.79 0.67   CALCIUM mg/dL 8.8 9.0 9.4   BILIRUBIN mg/dL  --  0.3 0.3   ALK PHOS U/L  --  62 67   ALT (SGPT) U/L  --  5 5   AST (SGOT) U/L  --  9 9   GLUCOSE mg/dL 108* 94 97         Lab Results   Lab Value Date/Time    LIPASE 15 10/19/2024 0500    LIPASE 21 09/27/2024 0558    LIPASE 17 06/22/2024 0043    LIPASE 12 (L) 04/27/2024 2348    LIPASE 17 12/01/2023 0729    LIPASE 11 11/28/2023 1121    LIPASE 20 08/21/2023 1851    LIPASE 9 03/16/2023 1048    LIPASE 25 03/04/2023 0515    LIPASE 15 (L) 01/09/2023 2108     LIPASE 18 (L) 10/01/2022 0423    LIPASE 17 (L) 09/03/2022 0622       Radiology:  US Abdomen Limited    (Results Pending)       Assessment & Plan   Active Hospital Problems    Diagnosis     **Abdominal pain        Assessment:  Patient with a significant upper abdominal pain intermittent nausea and vomiting and postprandial symptoms.  The differential diagnosis includes chronic cholecystitis as well as gastroduodenal ulcer disease    Plan:  Will obtain EGD and right upper quadrant abdominal ultrasound tomorrow  HIDA scan will be scheduled at the later, if EGD and ultrasound are normal  Patient can have regular diet for now and will be n.p.o. after midnight        I discussed the patients findings and my recommendations with patient and she seemed reassured          Giovanni Olmos M.D.  [facility], [place], operated by [facility]  [phone]

## 2024-10-20 NOTE — PLAN OF CARE
Goal Outcome Evaluation:  Plan of Care Reviewed With: patient        Progress: improving  Outcome Evaluation: Went over the plan of care and answered all questions. Vitals stable and pain is well cotrolled. Patient is up ad clayton and tolerating her diet. All medicaitons given without complications and no other issues this shift.

## 2024-10-20 NOTE — PLAN OF CARE
Goal Outcome Evaluation:  Plan of Care Reviewed With: patient        Progress: no change  Outcome Evaluation: admitted with c/o abd pain off and on x2 years, ivf, vdg, med for pain x1, slept most of shift, quiet and flat, gi consult

## 2024-10-21 ENCOUNTER — ANESTHESIA EVENT (OUTPATIENT)
Dept: GASTROENTEROLOGY | Facility: HOSPITAL | Age: 19
End: 2024-10-21

## 2024-10-21 ENCOUNTER — ANESTHESIA (OUTPATIENT)
Dept: GASTROENTEROLOGY | Facility: HOSPITAL | Age: 19
End: 2024-10-21

## 2024-10-21 LAB
ALBUMIN SERPL-MCNC: 3.5 G/DL (ref 3.5–5.2)
ALBUMIN/GLOB SERPL: 1.1 G/DL
ALP SERPL-CCNC: 59 U/L (ref 39–117)
ALT SERPL W P-5'-P-CCNC: <5 U/L (ref 1–33)
ANION GAP SERPL CALCULATED.3IONS-SCNC: 12.2 MMOL/L (ref 5–15)
AST SERPL-CCNC: 7 U/L (ref 1–32)
BILIRUB SERPL-MCNC: 0.3 MG/DL (ref 0–1.2)
BUN SERPL-MCNC: 5 MG/DL (ref 6–20)
BUN/CREAT SERPL: 8.2 (ref 7–25)
CALCIUM SPEC-SCNC: 8.9 MG/DL (ref 8.6–10.5)
CHLORIDE SERPL-SCNC: 101 MMOL/L (ref 98–107)
CO2 SERPL-SCNC: 20.8 MMOL/L (ref 22–29)
CREAT SERPL-MCNC: 0.61 MG/DL (ref 0.57–1)
DEPRECATED RDW RBC AUTO: 39.3 FL (ref 37–54)
EGFRCR SERPLBLD CKD-EPI 2021: 131.4 ML/MIN/1.73
ERYTHROCYTE [DISTWIDTH] IN BLOOD BY AUTOMATED COUNT: 13 % (ref 12.3–15.4)
GLOBULIN UR ELPH-MCNC: 3.2 GM/DL
GLUCOSE SERPL-MCNC: 78 MG/DL (ref 65–99)
HCT VFR BLD AUTO: 35.9 % (ref 34–46.6)
HGB BLD-MCNC: 11.1 G/DL (ref 12–15.9)
MCH RBC QN AUTO: 26 PG (ref 26.6–33)
MCHC RBC AUTO-ENTMCNC: 30.9 G/DL (ref 31.5–35.7)
MCV RBC AUTO: 84.1 FL (ref 79–97)
PBG UR-MCNC: 0.5 MG/L (ref 0–2)
PLATELET # BLD AUTO: 334 10*3/MM3 (ref 140–450)
PMV BLD AUTO: 9.9 FL (ref 6–12)
POTASSIUM SERPL-SCNC: 3.6 MMOL/L (ref 3.5–5.2)
PROT SERPL-MCNC: 6.7 G/DL (ref 6–8.5)
RBC # BLD AUTO: 4.27 10*6/MM3 (ref 3.77–5.28)
SODIUM SERPL-SCNC: 134 MMOL/L (ref 136–145)
WBC NRBC COR # BLD AUTO: 9.97 10*3/MM3 (ref 3.4–10.8)

## 2024-10-21 PROCEDURE — 25010000002 SODIUM CHLORIDE 0.9 % WITH KCL 20 MEQ 20-0.9 MEQ/L-% SOLUTION: Performed by: INTERNAL MEDICINE

## 2024-10-21 PROCEDURE — 43239 EGD BIOPSY SINGLE/MULTIPLE: CPT | Performed by: INTERNAL MEDICINE

## 2024-10-21 PROCEDURE — 96361 HYDRATE IV INFUSION ADD-ON: CPT

## 2024-10-21 PROCEDURE — 25810000003 LACTATED RINGERS PER 1000 ML

## 2024-10-21 PROCEDURE — 25010000002 PROPOFOL 10 MG/ML EMULSION

## 2024-10-21 PROCEDURE — G0378 HOSPITAL OBSERVATION PER HR: HCPCS

## 2024-10-21 PROCEDURE — 96376 TX/PRO/DX INJ SAME DRUG ADON: CPT

## 2024-10-21 PROCEDURE — 88305 TISSUE EXAM BY PATHOLOGIST: CPT | Performed by: INTERNAL MEDICINE

## 2024-10-21 PROCEDURE — 25810000003 SODIUM CHLORIDE 0.9 % SOLUTION: Performed by: INTERNAL MEDICINE

## 2024-10-21 PROCEDURE — 85027 COMPLETE CBC AUTOMATED: CPT | Performed by: INTERNAL MEDICINE

## 2024-10-21 PROCEDURE — 25010000002 GLYCOPYRROLATE 0.2 MG/ML SOLUTION

## 2024-10-21 PROCEDURE — 25010000002 LIDOCAINE 2% SOLUTION

## 2024-10-21 PROCEDURE — 80053 COMPREHEN METABOLIC PANEL: CPT | Performed by: INTERNAL MEDICINE

## 2024-10-21 PROCEDURE — 88342 IMHCHEM/IMCYTCHM 1ST ANTB: CPT | Performed by: INTERNAL MEDICINE

## 2024-10-21 PROCEDURE — 25010000002 ONDANSETRON PER 1 MG: Performed by: INTERNAL MEDICINE

## 2024-10-21 RX ORDER — PROPOFOL 10 MG/ML
VIAL (ML) INTRAVENOUS AS NEEDED
Status: DISCONTINUED | OUTPATIENT
Start: 2024-10-21 | End: 2024-10-21 | Stop reason: SURG

## 2024-10-21 RX ORDER — FLUMAZENIL 0.1 MG/ML
0.2 INJECTION INTRAVENOUS AS NEEDED
Status: DISCONTINUED | OUTPATIENT
Start: 2024-10-21 | End: 2024-10-21 | Stop reason: HOSPADM

## 2024-10-21 RX ORDER — PROMETHAZINE HYDROCHLORIDE 25 MG/1
25 SUPPOSITORY RECTAL ONCE AS NEEDED
Status: DISCONTINUED | OUTPATIENT
Start: 2024-10-21 | End: 2024-10-21 | Stop reason: HOSPADM

## 2024-10-21 RX ORDER — DIPHENHYDRAMINE HYDROCHLORIDE 50 MG/ML
12.5 INJECTION INTRAMUSCULAR; INTRAVENOUS
Status: DISCONTINUED | OUTPATIENT
Start: 2024-10-21 | End: 2024-10-21 | Stop reason: HOSPADM

## 2024-10-21 RX ORDER — ONDANSETRON 2 MG/ML
4 INJECTION INTRAMUSCULAR; INTRAVENOUS ONCE AS NEEDED
Status: DISCONTINUED | OUTPATIENT
Start: 2024-10-21 | End: 2024-10-21 | Stop reason: HOSPADM

## 2024-10-21 RX ORDER — DROPERIDOL 2.5 MG/ML
0.62 INJECTION, SOLUTION INTRAMUSCULAR; INTRAVENOUS
Status: DISCONTINUED | OUTPATIENT
Start: 2024-10-21 | End: 2024-10-21 | Stop reason: HOSPADM

## 2024-10-21 RX ORDER — PROMETHAZINE HYDROCHLORIDE 25 MG/1
25 TABLET ORAL ONCE AS NEEDED
Status: DISCONTINUED | OUTPATIENT
Start: 2024-10-21 | End: 2024-10-21 | Stop reason: HOSPADM

## 2024-10-21 RX ORDER — LIDOCAINE HYDROCHLORIDE 20 MG/ML
INJECTION, SOLUTION INFILTRATION; PERINEURAL AS NEEDED
Status: DISCONTINUED | OUTPATIENT
Start: 2024-10-21 | End: 2024-10-21 | Stop reason: SURG

## 2024-10-21 RX ORDER — NALOXONE HCL 0.4 MG/ML
0.2 VIAL (ML) INJECTION AS NEEDED
Status: DISCONTINUED | OUTPATIENT
Start: 2024-10-21 | End: 2024-10-21 | Stop reason: HOSPADM

## 2024-10-21 RX ORDER — SODIUM CHLORIDE, SODIUM LACTATE, POTASSIUM CHLORIDE, CALCIUM CHLORIDE 600; 310; 30; 20 MG/100ML; MG/100ML; MG/100ML; MG/100ML
INJECTION, SOLUTION INTRAVENOUS CONTINUOUS PRN
Status: DISCONTINUED | OUTPATIENT
Start: 2024-10-21 | End: 2024-10-21 | Stop reason: SURG

## 2024-10-21 RX ORDER — GLYCOPYRROLATE 0.2 MG/ML
INJECTION INTRAMUSCULAR; INTRAVENOUS AS NEEDED
Status: DISCONTINUED | OUTPATIENT
Start: 2024-10-21 | End: 2024-10-21 | Stop reason: SURG

## 2024-10-21 RX ORDER — SODIUM CHLORIDE 9 MG/ML
500 INJECTION, SOLUTION INTRAVENOUS CONTINUOUS
Status: ACTIVE | OUTPATIENT
Start: 2024-10-21 | End: 2024-10-21

## 2024-10-21 RX ADMIN — MONTELUKAST SODIUM 10 MG: 10 TABLET, FILM COATED ORAL at 22:00

## 2024-10-21 RX ADMIN — HYDROCODONE BITARTRATE AND ACETAMINOPHEN 1 TABLET: 5; 325 TABLET ORAL at 00:59

## 2024-10-21 RX ADMIN — HYDROCODONE BITARTRATE AND ACETAMINOPHEN 1 TABLET: 5; 325 TABLET ORAL at 16:32

## 2024-10-21 RX ADMIN — HYDROCODONE BITARTRATE AND ACETAMINOPHEN 1 TABLET: 5; 325 TABLET ORAL at 23:56

## 2024-10-21 RX ADMIN — POTASSIUM CHLORIDE AND SODIUM CHLORIDE 100 ML/HR: 900; 150 INJECTION, SOLUTION INTRAVENOUS at 06:22

## 2024-10-21 RX ADMIN — PANTOPRAZOLE SODIUM 40 MG: 40 INJECTION, POWDER, FOR SOLUTION INTRAVENOUS at 18:28

## 2024-10-21 RX ADMIN — GLYCOPYRROLATE 0.2 MG: 0.2 INJECTION INTRAMUSCULAR; INTRAVENOUS at 10:56

## 2024-10-21 RX ADMIN — PANTOPRAZOLE SODIUM 40 MG: 40 INJECTION, POWDER, FOR SOLUTION INTRAVENOUS at 06:45

## 2024-10-21 RX ADMIN — HYDROCODONE BITARTRATE AND ACETAMINOPHEN 1 TABLET: 5; 325 TABLET ORAL at 06:22

## 2024-10-21 RX ADMIN — SODIUM CHLORIDE, POTASSIUM CHLORIDE, SODIUM LACTATE AND CALCIUM CHLORIDE: 600; 310; 30; 20 INJECTION, SOLUTION INTRAVENOUS at 10:50

## 2024-10-21 RX ADMIN — PROPOFOL 160 MCG/KG/MIN: 10 INJECTION, EMULSION INTRAVENOUS at 10:56

## 2024-10-21 RX ADMIN — ONDANSETRON 4 MG: 2 INJECTION INTRAMUSCULAR; INTRAVENOUS at 08:34

## 2024-10-21 RX ADMIN — SODIUM CHLORIDE 500 ML: 9 INJECTION, SOLUTION INTRAVENOUS at 09:56

## 2024-10-21 RX ADMIN — LIDOCAINE HYDROCHLORIDE 60 MG: 20 INJECTION, SOLUTION INFILTRATION; PERINEURAL at 10:56

## 2024-10-21 RX ADMIN — PROPOFOL 150 MG: 10 INJECTION, EMULSION INTRAVENOUS at 10:56

## 2024-10-21 NOTE — ANESTHESIA PREPROCEDURE EVALUATION
Anesthesia Evaluation     Patient summary reviewed and Nursing notes reviewed                Airway   Mallampati: II  Dental      Pulmonary    (+) asthma,  Cardiovascular - negative cardio ROS    ECG reviewed  Rhythm: regular  Rate: normal        Neuro/Psych  (+) psychiatric history Anxiety  GI/Hepatic/Renal/Endo - negative ROS     Musculoskeletal (-) negative ROS    Abdominal    Substance History   (+) drug use (marijuana)     OB/GYN negative ob/gyn ROS         Other                      Anesthesia Plan    ASA 2     MAC     (braces)  intravenous induction     Anesthetic plan, risks, benefits, and alternatives have been provided, discussed and informed consent has been obtained with: patient.    CODE STATUS:    Code Status (Patient has no pulse and is not breathing): CPR (Attempt to Resuscitate)  Medical Interventions (Patient has pulse or is breathing): Full Support

## 2024-10-21 NOTE — PROGRESS NOTES
" LOS: 0 days     Name: Dariel Schilling  Age: 19 y.o.  Sex: female  :  2005  MRN: [id number]         Primary Care Physician: System, Provider Not In    Subjective   Subjective  Still with some nausea.  No vomiting.  Still with abdominal pain.  Going for EGD today.    Objective   Vital Signs  Temp:  [97.7 °F (36.5 °C)-99.3 °F (37.4 °C)] 97.7 °F (36.5 °C)  Heart Rate:  [] 79  Resp:  [16] 16  BP: ()/(64-74) 95/65  Body mass index is 22.47 kg/m².    Objective:  General Appearance:  Comfortable and in no acute distress.    Vital signs: (most recent): Blood pressure 95/65, pulse 79, temperature 97.7 °F (36.5 °C), temperature source Oral, resp. rate 16, height 165.1 cm (65\"), weight 61.2 kg (135 lb), last menstrual period 10/13/2024, SpO2 97%.    Lungs:  Normal effort and normal respiratory rate.  She is not in respiratory distress.  There are decreased breath sounds.    Heart: Normal rate.  Regular rhythm.    Abdomen: Abdomen is soft.  Bowel sounds are normal.   There is no abdominal tenderness.     Extremities: There is no dependent edema or local swelling.    Neurological: Patient is alert and oriented to person, place and time.    Skin:  Warm and dry.                Results Review:       I reviewed the patient's new clinical results.    Results from last 7 days   Lab Units 10/21/24  0623 10/20/24  0405 10/19/24  1623 10/19/24  0500   WBC 10*3/mm3 9.97 16.49* 11.99* 8.71   HEMOGLOBIN g/dL 11.1* 10.8* 12.0 11.8*   PLATELETS 10*3/mm3 334 325 325 330     Results from last 7 days   Lab Units 10/21/24  0623 10/20/24  0405 10/19/24  1623 10/19/24  0500   SODIUM mmol/L 134* 136 136 138   POTASSIUM mmol/L 3.6 3.8 3.6 3.6   CHLORIDE mmol/L 101 105 103 103   CO2 mmol/L 20.8* 19.5* 22.2 24.0   BUN mg/dL 5* 5* 5* 6   CREATININE mg/dL 0.61 0.60 0.79 0.67   CALCIUM mg/dL 8.9 8.8 9.0 9.4   GLUCOSE mg/dL 78 108* 94 97                 Scheduled Meds:   montelukast, 10 mg, Oral, Nightly  pantoprazole, 40 mg, " Intravenous, BID AC      PRN Meds:     acetaminophen **OR** acetaminophen **OR** acetaminophen    albuterol    senna-docusate sodium **AND** polyethylene glycol **AND** bisacodyl **AND** bisacodyl    HYDROcodone-acetaminophen    Morphine    ondansetron    [COMPLETED] Insert Peripheral IV **AND** sodium chloride  Continuous Infusions:  sodium chloride 0.9 % with KCl 20 mEq, 100 mL/hr, Last Rate: 100 mL/hr (10/21/24 0852)        Assessment & Plan   Active Hospital Problems    Diagnosis  POA    SIRS (systemic inflammatory response syndrome) [R65.10]  Yes    Intractable abdominal pain [R10.9]  Yes    Asthma [J45.909]  Yes    Nausea and vomiting [R11.2]  Yes    Chronic abdominal pain [R10.9, G89.29]  Yes      Resolved Hospital Problems   No resolved problems to display.       Assessment & Plan    Dariel Schilling is a 19 y.o. female presents to the hospital with SIRS and acute on chronic upper abdominal pain associated with nausea and vomiting.     -Going for EGD today.  Abdominal ultrasound yesterday was unremarkable.  If EGD negative plans noted for HIDA scan  -Continue supportive measures with IV fluids and antiemetics.  -Continue IV PPI      Expected discharge date/ time has not been documented.     Kendrick Swain MD  [facility]  10/21/24  09:34 EDT

## 2024-10-21 NOTE — PROGRESS NOTES
Discharge Planning Assessment  [facility]     Patient Name: Dariel Schilling  MRN: [id number]  Today's Date: 10/21/2024    Admit Date: 10/19/2024    Plan: Home with family assist   Discharge Needs Assessment       Row Name 10/21/24 1225       Living Environment    People in Home grandparent(s);parent(s)    Current Living Arrangements home    Potentially Unsafe Housing Conditions none    Primary Care Provided by self    Provides Primary Care For no one    Family Caregiver if Needed grandparent(s);parent(s)    Quality of Family Relationships helpful;involved;supportive    Able to Return to Prior Arrangements yes       Transition Planning    Patient/Family Anticipates Transition to home with family    Patient/Family Anticipated Services at Transition none    Transportation Anticipated family or friend will provide       Discharge Needs Assessment    Equipment Currently Used at Home none    Concerns to be Addressed no discharge needs identified    Equipment Needed After Discharge none    Provided Post Acute Provider List? N/A    N/A Provider List Comment Denies needs. Plan is home                   Discharge Plan       Row Name 10/21/24 1222       Plan    Plan Home with family assist    Patient/Family in Agreement with Plan yes    Plan Comments Introduced self and role of CCP. Boyfriend at bedside. Patient agreeable to discuss with all present. Facesheet verified. Patient lives in a house with her mother, Coral Schilling and her grandmother, Kerri Schilling. Stated she is independent with ADL's and uses no DMEs. Stated her PCP was Beba Gaytan but stated she has left practice and her office is working to find PCP in same group ([facility]). DC plan is to return home. Stated her family will assist as needed and will provide transportation at DC. Denies any needs/equipment.                Demographic Summary       Row Name 10/21/24 1222       General Information    Admission Type observation    Arrived From home     Reason for Consult discharge planning    Preferred Language English                   Functional Status       Row Name 10/21/24 1224       Functional Status    Usual Activity Tolerance good    Current Activity Tolerance good       Functional Status, IADL    Medications independent    Meal Preparation independent    Housekeeping independent    Laundry independent    Shopping independent       Mental Status    General Appearance WDL WDL       Employment/    Employment Status employed part-time                   Psychosocial       Row Name 10/21/24 1224       Values/Beliefs    Spiritual, Cultural Beliefs, Taoist Practices, Values that Affect Care no       Behavior WDL    Behavior WDL WDL       Emotion Mood WDL    Emotion/Mood/Affect WDL WDL       Speech WDL    Speech WDL WDL       Coping/Stress    Sources of Support parent(s);significant other;other family members    Reaction to Health Status adjusting    Understanding of Condition and Treatment adequate understanding of treatment       Developmental Stage (Eriksson's Stages of Development)    Developmental Stage Stage 6 (18-35 years/Young Adulthood) Intimacy vs. Isolation                   Abuse/Neglect       Row Name 10/21/24 1225       Personal Safety    Feels Unsafe at Home or Work/School no    Feels Threatened by Someone no    Does Anyone Try to Keep You From Having Contact with Others or Doing Things Outside Your Home? no    Physical Signs of Abuse Present no             Deepti Raya, RN

## 2024-10-21 NOTE — PLAN OF CARE
Goal Outcome Evaluation:  Plan of Care Reviewed With: patient, parent        Progress: improving  Outcome Evaluation: VSS, on room air, norco for pain w/ relief, NPO tonight for HIDA scan tomorrow. EGD complete today - patient toelrated well. IVFs as ordered, up ad clayton, tolerating GI soft diet, patient and her mother updated on plan of care.

## 2024-10-21 NOTE — ANESTHESIA POSTPROCEDURE EVALUATION
Patient: Dariel Schilling    Procedure Summary       Date: 10/21/24 Room / Location:  [facility] / [facility]    Anesthesia Start: 1053 Anesthesia Stop: 1110    Procedure: ESOPHAGOGASTRODUODENOSCOPY with cold biopsies (Esophagus) Diagnosis:       Intractable abdominal pain      Nausea and vomiting, unspecified vomiting type      (Intractable abdominal pain [R10.9])      (Nausea and vomiting, unspecified vomiting type [R11.2])    Surgeons: Viktoriya Parmar MD Provider: Felix Payne MD    Anesthesia Type: MAC ASA Status: 2            Anesthesia Type: MAC    Vitals  Vitals Value Taken Time   /76 10/21/24 1121   Temp     Pulse 81 10/21/24 1130   Resp 20 10/21/24 1118   SpO2 100 % 10/21/24 1130   Vitals shown include unfiled device data.        Post Anesthesia Care and Evaluation    Patient location during evaluation: PACU  Patient participation: complete - patient participated  Level of consciousness: awake and alert  Pain management: adequate    Airway patency: patent  Anesthetic complications: No anesthetic complications    Cardiovascular status: acceptable  Respiratory status: acceptable  Hydration status: acceptable    Comments: ---------------------------               10/21/24                      1146         ---------------------------   BP:          104/75         Pulse:         77           Resp:          18           Temp:   36.4 °C (97.6 °F)   SpO2:          98%         ---------------------------

## 2024-10-22 ENCOUNTER — APPOINTMENT (OUTPATIENT)
Dept: NUCLEAR MEDICINE | Facility: HOSPITAL | Age: 19
End: 2024-10-22

## 2024-10-22 LAB
ALBUMIN SERPL-MCNC: 3.4 G/DL (ref 3.5–5.2)
ALBUMIN/GLOB SERPL: 1 G/DL
ALP SERPL-CCNC: 56 U/L (ref 39–117)
ALT SERPL W P-5'-P-CCNC: <5 U/L (ref 1–33)
ANION GAP SERPL CALCULATED.3IONS-SCNC: 7.1 MMOL/L (ref 5–15)
AST SERPL-CCNC: 7 U/L (ref 1–32)
BILIRUB SERPL-MCNC: 0.3 MG/DL (ref 0–1.2)
BUN SERPL-MCNC: 5 MG/DL (ref 6–20)
BUN/CREAT SERPL: 9.4 (ref 7–25)
CALCIUM SPEC-SCNC: 9.1 MG/DL (ref 8.6–10.5)
CHLORIDE SERPL-SCNC: 107 MMOL/L (ref 98–107)
CO2 SERPL-SCNC: 24.9 MMOL/L (ref 22–29)
CREAT SERPL-MCNC: 0.53 MG/DL (ref 0.57–1)
DEPRECATED RDW RBC AUTO: 39.9 FL (ref 37–54)
EGFRCR SERPLBLD CKD-EPI 2021: 136 ML/MIN/1.73
ERYTHROCYTE [DISTWIDTH] IN BLOOD BY AUTOMATED COUNT: 12.9 % (ref 12.3–15.4)
GLOBULIN UR ELPH-MCNC: 3.5 GM/DL
GLUCOSE BLDC GLUCOMTR-MCNC: 79 MG/DL (ref 70–130)
GLUCOSE SERPL-MCNC: 85 MG/DL (ref 65–99)
HCT VFR BLD AUTO: 33.5 % (ref 34–46.6)
HGB BLD-MCNC: 10.4 G/DL (ref 12–15.9)
MAGNESIUM SERPL-MCNC: 2.4 MG/DL (ref 1.7–2.2)
MCH RBC QN AUTO: 26.5 PG (ref 26.6–33)
MCHC RBC AUTO-ENTMCNC: 31 G/DL (ref 31.5–35.7)
MCV RBC AUTO: 85.2 FL (ref 79–97)
PLATELET # BLD AUTO: 339 10*3/MM3 (ref 140–450)
PMV BLD AUTO: 9.8 FL (ref 6–12)
POTASSIUM SERPL-SCNC: 3.8 MMOL/L (ref 3.5–5.2)
PROT SERPL-MCNC: 6.9 G/DL (ref 6–8.5)
RBC # BLD AUTO: 3.93 10*6/MM3 (ref 3.77–5.28)
SODIUM SERPL-SCNC: 139 MMOL/L (ref 136–145)
WBC NRBC COR # BLD AUTO: 8.13 10*3/MM3 (ref 3.4–10.8)

## 2024-10-22 PROCEDURE — 78227 HEPATOBIL SYST IMAGE W/DRUG: CPT

## 2024-10-22 PROCEDURE — G0378 HOSPITAL OBSERVATION PER HR: HCPCS

## 2024-10-22 PROCEDURE — 25010000002 ONDANSETRON PER 1 MG: Performed by: INTERNAL MEDICINE

## 2024-10-22 PROCEDURE — 83735 ASSAY OF MAGNESIUM: CPT | Performed by: INTERNAL MEDICINE

## 2024-10-22 PROCEDURE — 80053 COMPREHEN METABOLIC PANEL: CPT | Performed by: INTERNAL MEDICINE

## 2024-10-22 PROCEDURE — 25010000002 DIPHENHYDRAMINE PER 50 MG: Performed by: INTERNAL MEDICINE

## 2024-10-22 PROCEDURE — 99222 1ST HOSP IP/OBS MODERATE 55: CPT

## 2024-10-22 PROCEDURE — 0 TECHNETIUM TC 99M MEBROFENIN KIT: Performed by: INTERNAL MEDICINE

## 2024-10-22 PROCEDURE — 99214 OFFICE O/P EST MOD 30 MIN: CPT | Performed by: PHYSICIAN ASSISTANT

## 2024-10-22 PROCEDURE — A9537 TC99M MEBROFENIN: HCPCS | Performed by: INTERNAL MEDICINE

## 2024-10-22 PROCEDURE — 25010000002 PROCHLORPERAZINE 10 MG/2ML SOLUTION: Performed by: INTERNAL MEDICINE

## 2024-10-22 PROCEDURE — 25810000003 SODIUM CHLORIDE 0.9 % SOLUTION: Performed by: INTERNAL MEDICINE

## 2024-10-22 PROCEDURE — 25010000002 MAGNESIUM SULFATE IN D5W 1G/100ML (PREMIX) 1-5 GM/100ML-% SOLUTION: Performed by: INTERNAL MEDICINE

## 2024-10-22 PROCEDURE — 25010000002 SINCALIDE PER 5 MCG: Performed by: INTERNAL MEDICINE

## 2024-10-22 PROCEDURE — 85027 COMPLETE CBC AUTOMATED: CPT | Performed by: INTERNAL MEDICINE

## 2024-10-22 PROCEDURE — 82948 REAGENT STRIP/BLOOD GLUCOSE: CPT

## 2024-10-22 RX ORDER — SODIUM CHLORIDE 9 MG/ML
75 INJECTION, SOLUTION INTRAVENOUS CONTINUOUS
Status: DISCONTINUED | OUTPATIENT
Start: 2024-10-22 | End: 2024-10-23

## 2024-10-22 RX ORDER — DIPHENHYDRAMINE HYDROCHLORIDE 50 MG/ML
25 INJECTION INTRAMUSCULAR; INTRAVENOUS ONCE
Status: COMPLETED | OUTPATIENT
Start: 2024-10-22 | End: 2024-10-22

## 2024-10-22 RX ORDER — MAGNESIUM SULFATE 1 G/100ML
1 INJECTION INTRAVENOUS ONCE
Status: COMPLETED | OUTPATIENT
Start: 2024-10-22 | End: 2024-10-22

## 2024-10-22 RX ORDER — KIT FOR THE PREPARATION OF TECHNETIUM TC 99M MEBROFENIN 45 MG/10ML
1 INJECTION, POWDER, LYOPHILIZED, FOR SOLUTION INTRAVENOUS
Status: COMPLETED | OUTPATIENT
Start: 2024-10-22 | End: 2024-10-22

## 2024-10-22 RX ORDER — PANTOPRAZOLE SODIUM 40 MG/1
40 TABLET, DELAYED RELEASE ORAL
Status: DISCONTINUED | OUTPATIENT
Start: 2024-10-22 | End: 2024-10-24 | Stop reason: HOSPADM

## 2024-10-22 RX ORDER — PROCHLORPERAZINE EDISYLATE 5 MG/ML
10 INJECTION INTRAMUSCULAR; INTRAVENOUS ONCE
Status: COMPLETED | OUTPATIENT
Start: 2024-10-22 | End: 2024-10-22

## 2024-10-22 RX ORDER — SUCRALFATE 1 G/1
1 TABLET ORAL
Status: DISCONTINUED | OUTPATIENT
Start: 2024-10-22 | End: 2024-10-24 | Stop reason: HOSPADM

## 2024-10-22 RX ADMIN — PANTOPRAZOLE SODIUM 40 MG: 40 TABLET, DELAYED RELEASE ORAL at 18:29

## 2024-10-22 RX ADMIN — HYDROCODONE BITARTRATE AND ACETAMINOPHEN 1 TABLET: 5; 325 TABLET ORAL at 09:16

## 2024-10-22 RX ADMIN — SINCALIDE 1.2 MCG: 5 INJECTION, POWDER, LYOPHILIZED, FOR SOLUTION INTRAVENOUS at 08:00

## 2024-10-22 RX ADMIN — SUCRALFATE 1 G: 1 TABLET ORAL at 18:29

## 2024-10-22 RX ADMIN — SUCRALFATE 1 G: 1 TABLET ORAL at 20:33

## 2024-10-22 RX ADMIN — DIPHENHYDRAMINE HYDROCHLORIDE 25 MG: 50 INJECTION, SOLUTION INTRAMUSCULAR; INTRAVENOUS at 11:51

## 2024-10-22 RX ADMIN — MAGNESIUM SULFATE IN DEXTROSE 1 G: 10 INJECTION, SOLUTION INTRAVENOUS at 11:51

## 2024-10-22 RX ADMIN — SUCRALFATE 1 G: 1 TABLET ORAL at 11:51

## 2024-10-22 RX ADMIN — SODIUM CHLORIDE 75 ML/HR: 9 INJECTION, SOLUTION INTRAVENOUS at 13:23

## 2024-10-22 RX ADMIN — PROCHLORPERAZINE EDISYLATE 10 MG: 5 INJECTION INTRAMUSCULAR; INTRAVENOUS at 11:51

## 2024-10-22 RX ADMIN — ONDANSETRON 4 MG: 2 INJECTION INTRAMUSCULAR; INTRAVENOUS at 16:35

## 2024-10-22 RX ADMIN — PANTOPRAZOLE SODIUM 40 MG: 40 TABLET, DELAYED RELEASE ORAL at 09:16

## 2024-10-22 RX ADMIN — MEBROFENIN 1 DOSE: 45 INJECTION, POWDER, LYOPHILIZED, FOR SOLUTION INTRAVENOUS at 07:08

## 2024-10-22 RX ADMIN — MONTELUKAST SODIUM 10 MG: 10 TABLET, FILM COATED ORAL at 20:33

## 2024-10-22 NOTE — PLAN OF CARE
"Goal Outcome Evaluation:  Plan of Care Reviewed With: patient        Progress: no change  Outcome Evaluation: VSS, on room air, up ad clayton, voiding without issue, HIDA scan done this AM, surgery consult placed per order. IVFs as ordered. oxycodone for pain w/ relief. Pt c/o headache and a migraine cocktail was ordered. A few minutes after recieving IV benadryl and compazine patient reported feeling \"off\" and that she needed to lay down. VSS were stable and patient went to sleep but was easy to wake and remained A&O x4. Magnesium also given. About 1320 patient complained of feeling funny - reported lips, face, and whole body was tingling, and she felt like she was \"out of her head\". VSS remained stable, blood sugar checked and was 79. MD notified, orders placed to check magnesium, and continue to monitor the patient. At this time atient reports feeling better and back to her normal. Pt and her mother updated on plan of care.                             "

## 2024-10-22 NOTE — PROGRESS NOTES
" LOS: 0 days     Name: Dariel Schilling  Age: 19 y.o.  Sex: female  :  2005  MRN: [id number]         Primary Care Physician: System, Provider Not In    Subjective   Subjective  Had return of abdominal pain and nausea after EGD yesterday.  Also with ongoing migraine headache    Objective   Vital Signs  Temp:  [97 °F (36.1 °C)-98.5 °F (36.9 °C)] 97.3 °F (36.3 °C)  Heart Rate:  [65-93] 87  Resp:  [16-18] 16  BP: ()/(61-76) 97/61  Body mass index is 22.47 kg/m².    Objective:  General Appearance:  Comfortable and in no acute distress.    Vital signs: (most recent): Blood pressure 97/61, pulse 87, temperature 97.3 °F (36.3 °C), temperature source Oral, resp. rate 16, height 165.1 cm (65\"), weight 61.2 kg (135 lb), last menstrual period 10/13/2024, SpO2 100%.    Lungs:  Normal effort and normal respiratory rate.  She is not in respiratory distress.  There are decreased breath sounds.    Heart: Normal rate.  Regular rhythm.    Abdomen: Abdomen is soft.  Bowel sounds are normal.   There is no abdominal tenderness.     Extremities: There is no dependent edema or local swelling.    Neurological: Patient is alert and oriented to person, place and time.    Skin:  Warm and dry.                Results Review:       I reviewed the patient's new clinical results.    Results from last 7 days   Lab Units 10/22/24  0302 10/21/24  0623 10/20/24  0405 10/19/24  1623 10/19/24  0500   WBC 10*3/mm3 8.13 9.97 16.49* 11.99* 8.71   HEMOGLOBIN g/dL 10.4* 11.1* 10.8* 12.0 11.8*   PLATELETS 10*3/mm3 339 334 325 325 330     Results from last 7 days   Lab Units 10/22/24  0302 10/21/24  0623 10/20/24  0405 10/19/24  1623 10/19/24  0500   SODIUM mmol/L 139 134* 136 136 138   POTASSIUM mmol/L 3.8 3.6 3.8 3.6 3.6   CHLORIDE mmol/L 107 101 105 103 103   CO2 mmol/L 24.9 20.8* 19.5* 22.2 24.0   BUN mg/dL 5* 5* 5* 5* 6   CREATININE mg/dL 0.53* 0.61 0.60 0.79 0.67   CALCIUM mg/dL 9.1 8.9 8.8 9.0 9.4   GLUCOSE mg/dL 85 78 108* 94 97       "           Scheduled Meds:   montelukast, 10 mg, Oral, Nightly  pantoprazole, 40 mg, Oral, BID AC  sucralfate, 1 g, Oral, 4x Daily AC & at Bedtime      PRN Meds:     acetaminophen **OR** acetaminophen **OR** acetaminophen    albuterol    senna-docusate sodium **AND** polyethylene glycol **AND** bisacodyl **AND** bisacodyl    HYDROcodone-acetaminophen    Morphine    ondansetron    [COMPLETED] Insert Peripheral IV **AND** sodium chloride  Continuous Infusions:       Assessment & Plan   Active Hospital Problems    Diagnosis  POA    SIRS (systemic inflammatory response syndrome) [R65.10]  Yes    Intractable abdominal pain [R10.9]  Yes    Asthma [J45.909]  Yes    Nausea and vomiting [R11.2]  Yes    Chronic abdominal pain [R10.9, G89.29]  Yes      Resolved Hospital Problems   No resolved problems to display.       Assessment & Plan    Dariel Schilling is a 19 y.o. female presents to the hospital with SIRS and acute on chronic upper abdominal pain associated with nausea and vomiting.      -EGD reviewed.  She is on PPI and Carafate.    -HIDA scan shows diminished gallbladder EF.  Will ask for an opinion from general surgery  -Continue supportive measures with IV fluids and antiemetics.  -Clear liquid diet  -We will give her a migraine cocktail of IV Benadryl, Compazine, and magnesium.  Avoid Toradol given the gastritis.      Expected Discharge Date: 10/22/2024; Expected Discharge Time:      Kendrick Swain MD  [facility]  10/22/24  12:25 EDT

## 2024-10-22 NOTE — PROGRESS NOTES
[facility]  Inpatient Progress Note    Reason for Follow-up:  Nausea and vomiting, epigastric and right upper quadrant abdominal pain     Subjective     Interval History:   Change in abdominal pain.  Less nausea and vomiting today.  She reports pain during the HIDA scan.    EGD with erosive gastritis, pending pathology.  Dr. Parmar felt pain was out of proportion to gastritis findings.  On PPI and sucralfate.    Current Facility-Administered Medications:     acetaminophen (TYLENOL) tablet 650 mg, 650 mg, Oral, Q4H PRN, 650 mg at 10/20/24 0920 **OR** acetaminophen (TYLENOL) 160 MG/5ML oral solution 650 mg, 650 mg, Oral, Q4H PRN **OR** acetaminophen (TYLENOL) suppository 650 mg, 650 mg, Rectal, Q4H PRN, Maryam Dexter MD    albuterol (PROVENTIL) nebulizer solution 0.083% 2.5 mg/3mL, 2.5 mg, Nebulization, Q6H PRN, Maryam Dexter MD    sennosides-docusate (PERICOLACE) 8.6-50 MG per tablet 2 tablet, 2 tablet, Oral, BID PRN **AND** polyethylene glycol (MIRALAX) packet 17 g, 17 g, Oral, Daily PRN **AND** bisacodyl (DULCOLAX) EC tablet 5 mg, 5 mg, Oral, Daily PRN **AND** bisacodyl (DULCOLAX) suppository 10 mg, 10 mg, Rectal, Daily PRN, Maryam Dexter MD    HYDROcodone-acetaminophen (NORCO) 5-325 MG per tablet 1 tablet, 1 tablet, Oral, Q4H PRN, Jono Mijares MD, 1 tablet at 10/22/24 0916    montelukast (SINGULAIR) tablet 10 mg, 10 mg, Oral, Nightly, Maryam Dexter MD, 10 mg at 10/21/24 2200    morphine injection 2 mg, 2 mg, Intravenous, Q4H PRN, Maryam Dexter MD, 2 mg at 10/20/24 0320    ondansetron (ZOFRAN) injection 4 mg, 4 mg, Intravenous, Q6H PRN, Maryam Dexter MD, 4 mg at 10/21/24 0834    pantoprazole (PROTONIX) EC tablet 40 mg, 40 mg, Oral, BID AC, Kendrick Swain MD, 40 mg at 10/22/24 0916    [COMPLETED] Insert Peripheral IV, , , Once **AND** sodium chloride 0.9 % flush 10 mL, 10 mL, Intravenous, PRN, Anderson Zelaya, PA     sodium chloride 0.9 % infusion, 75 mL/hr, Intravenous, Continuous, Kendrick Swain MD    sucralfate (CARAFATE) tablet 1 g, 1 g, Oral, 4x Daily AC & at Bedtime, Kendrick Swain MD, 1 g at 10/22/24 1151      Objective     Vital Signs  Temp:  [97 °F (36.1 °C)-98.5 °F (36.9 °C)] 97.3 °F (36.3 °C)  Heart Rate:  [65-93] 87  Resp:  [16-18] 16  BP: ()/(61-76) 97/61  Body mass index is 22.47 kg/m².  No intake or output data in the 24 hours ending 10/22/24 1237  No intake/output data recorded.     Physical Exam:    General: patient awake, alert and cooperative   Eyes: Normal lids and lashes, no scleral icterus   Skin: warm and dry, not jaundiced   Pulm: regular and unlabored   Abdomen: soft, diffuse abdominal pain with guarding, nondistended; normal bowel sounds   Psychiatric: Normal mood and behavior; memory intact     Results Review:     I reviewed the patient's new clinical results.    Results from last 7 days   Lab Units 10/22/24  0302 10/21/24  0623 10/20/24  0405   WBC 10*3/mm3 8.13 9.97 16.49*   HEMOGLOBIN g/dL 10.4* 11.1* 10.8*   HEMATOCRIT % 33.5* 35.9 32.9*   PLATELETS 10*3/mm3 339 334 325     Results from last 7 days   Lab Units 10/22/24  0302 10/21/24  0623 10/20/24  0405 10/19/24  1623   SODIUM mmol/L 139 134* 136 136   POTASSIUM mmol/L 3.8 3.6 3.8 3.6   CHLORIDE mmol/L 107 101 105 103   CO2 mmol/L 24.9 20.8* 19.5* 22.2   BUN mg/dL 5* 5* 5* 5*   CREATININE mg/dL 0.53* 0.61 0.60 0.79   CALCIUM mg/dL 9.1 8.9 8.8 9.0   BILIRUBIN mg/dL 0.3 0.3  --  0.3   ALK PHOS U/L 56 59  --  62   ALT (SGPT) U/L <5 <5  --  5   AST (SGOT) U/L 7 7  --  9   GLUCOSE mg/dL 85 78 108* 94         Lab Results   Lab Value Date/Time    LIPASE 15 10/19/2024 0500    LIPASE 21 09/27/2024 0558    LIPASE 17 06/22/2024 0043    LIPASE 12 (L) 04/27/2024 2348    LIPASE 17 12/01/2023 0729    LIPASE 11 11/28/2023 1121    LIPASE 20 08/21/2023 1851    LIPASE 9 03/16/2023 1048    LIPASE 25 03/04/2023 0515    LIPASE 15 (L)  01/09/2023 2108    LIPASE 18 (L) 10/01/2022 0423    LIPASE 17 (L) 09/03/2022 0622       Radiology:  NM HIDA SCAN WITH PHARMACOLOGICAL INTERVENTION   Final Result       1. Patent cystic duct.   2. Patent CBD.   3. Gallbladder ejection fraction measures 22% and is diminished. Finding   can be seen with chronic cholecystitis or functional gallbladder   dysfunction               This report was finalized on 10/22/2024 10:27 AM by Dr. Raoul Crow M.D on Workstation: [id number]          US Abdomen Limited   Final Result          Assessment & Plan     Active Hospital Problems    Diagnosis     SIRS (systemic inflammatory response syndrome)     Intractable abdominal pain     Asthma     Nausea and vomiting     Chronic abdominal pain        Assessment:  Generalized abdominal pain, worse in the upper  Nausea and vomiting  Abnormal HIDA scan  Erosive gastritis on EGD      Plan:  Continue PPI and sucralfate for erosive gastritis noted on EGD.  Reviewed with Dr. Parmar who felt pain was out of proportion to findings on EGD.  HIDA scan with reduced ejection fraction.  General surgery already consulted.  Continue PPI and sucralfate for gastritis.  Keep n.p.o. until surgery can see.    I discussed the patients findings and my recommendations with patient and family.    Dragon dictation used throughout this note.            Tracie Julian PA-C  [facility]  [address] [place] [address]  Office: [phone]

## 2024-10-22 NOTE — PLAN OF CARE
Goal Outcome Evaluation:  Plan of Care Reviewed With: patient        Progress: improving  Outcome Evaluation: iv leaking and discontinued, up ad clayton, walked, vdg, med for pain, tolerating diet but npo after 12 mid for sea, friend at bs

## 2024-10-22 NOTE — PROGRESS NOTES
Continued Stay Note  [facility]     Patient Name: Dariel Schilling  MRN: [id number]  Today's Date: 10/22/2024    Admit Date: 10/19/2024    Plan: Home with family assist   Discharge Plan       Row Name 10/22/24 1532       Plan    Plan Home with family assist    Plan Comments Poss Lap Choley tomorrow. CCP is continuing to follow for poss needs/equipment                   Discharge Codes    No documentation.                 Expected Discharge Date and Time       Expected Discharge Date Expected Discharge Time    Oct 24, 2024               Deepti Raya RN

## 2024-10-22 NOTE — CONSULTS
General Surgery     Summary:     Dariel Schilling is a 19 y.o. year old with nausea, vomiting, RUQ abdominal pain and a HIDA scan with EF of 22%. Abdominal exam RUQ tenderness without rebound or guarding. US of gallbladder was normal, EGD 10/21 demonstrated gastritis. LFTs and WBC are normal. She is afebrile with stable vitals.     Plan:  -Dr. Acosta to see patient, anticipate Laparoscopic cholecystectomy tomorrow  -NPO at [place]    Chief Complaint:    RUQ abdominal pain, nausea, vomiting    History of Present Illness:     Dariel Schilling is a 19 y.o. year old who presented to the ED on 10/19 with abdominal pain, nausea, and vomiting. Reports RUQ abdominal pain with associated nausea and vomiting has been an intermittent problem for the past 2 years. Reports some postprandial pain that wakes her up in the middle of the night. She has had multiple ED visits with normal gallbladder noted on prior CT and US. Patient denies diarrhea, SOB, chest pain, hematemesis, melena, or hematochezia. Positive for subjective fever and chills, decreased appetite in the past 2 years related to the pain and nausea. EGD was completed 10/21/24 by Dr. Parmar which demonstrated gastritis, she was prescribed PPI and sucralfate. HIDA scan ordered which demonstrated a patent cystic duct and CBD, but and EF of 22%.     Past Medical History:   Past Medical History:   Diagnosis Date    Allergic     Anemia     iron deficiency    Anxiety     Asthma     Chlamydia     Chronic cough 05/29/2024    Eczema     Seasonal allergies     Visual impairment         Past Surgical History:    EGD -10/21/2024 Dr. Parmar -gastritis  Denies any prior abdominal surgeries    Family History:    Denies family history of colon cancer  Mother - cholecystectomy      Social History:    Social History     Socioeconomic History    Marital status: Single   Tobacco Use    Smoking status: Never    Smokeless tobacco: Never   Vaping Use    Vaping status: Never Used   Substance and  Sexual Activity    Alcohol use: Never    Drug use: Never     Types: Marijuana     Comment: every other day    Sexual activity: Defer     Partners: Female     Birth control/protection: Condom, Patch       Allergies:   Allergies   Allergen Reactions    Pomegranate [[place]] Swelling       Medications:     Albuterol  Zyrtec  Banophen  Ortho Evra  Singulair  Zofran      Radiology/Endoscopy:    HIDA scan 10/22/24  FINDINGS:     Radiotracer seen in the liver, gallbladder, biliary system and proximal  bowel at 30 minutes with increased radiotracer in the gallbladder and  bowel at 60 minutes. CCK administered with gallbladder ejection fraction  measuring 22%.     IMPRESSION:     1. Patent cystic duct.  2. Patent CBD.  3. Gallbladder ejection fraction measures 22% and is diminished. Finding  can be seen with chronic cholecystitis or functional gallbladder  dysfunction     This report was finalized on 10/22/2024 10:27 AM by Dr. Raoul Crow M.D on Workstation: [id number]    Gallbladder US 10/20/24     ULTRASOUND FINDINGS: Visualized portions of the pancreas have a  satisfactory appearance, pancreas only seen in part. No ductal  dilatation seen.     There is antegrade flow demonstrated within the main portal vein seen on  color Doppler and spectral analysis. The gallbladder is normal in  appearance. No gallstones or pericholecystic fluid nor biliary duct  dilatation with a CBD diameter of 2 mm. Liver size and echotexture are  within normal limits. No free fluid is seen within the upper abdomen.     The right kidney measures 9.3 cm in length. No mass, calculus or  obstructive uropathy seen.     CONCLUSION: Limited evaluation of the pancreas, sonogram of the right  upper quadrant is otherwise within normal limits.     This report was finalized on 10/20/2024 6:54 PM by Dr. Yonas Raphael M.D on Workstation: [id number]      Labs:    Results from last 7 days   Lab Units 10/22/24  0302 10/21/24  0623 10/20/24  0404  10/19/24  1623 10/19/24  0500   WBC 10*3/mm3 8.13 9.97 16.49* 11.99* 8.71   HEMOGLOBIN g/dL 10.4* 11.1* 10.8* 12.0 11.8*   HEMATOCRIT % 33.5* 35.9 32.9* 37.4 37.8   PLATELETS 10*3/mm3 339 334 325 325 330     Results from last 7 days   Lab Units 10/22/24  0302 10/21/24  0623 10/20/24  0405 10/19/24  1623   SODIUM mmol/L 139 134* 136 136   POTASSIUM mmol/L 3.8 3.6 3.8 3.6   CHLORIDE mmol/L 107 101 105 103   CO2 mmol/L 24.9 20.8* 19.5* 22.2   BUN mg/dL 5* 5* 5* 5*   CREATININE mg/dL 0.53* 0.61 0.60 0.79   CALCIUM mg/dL 9.1 8.9 8.8 9.0   BILIRUBIN mg/dL 0.3 0.3  --  0.3   ALK PHOS U/L 56 59  --  62   ALT (SGPT) U/L <5 <5  --  5   AST (SGOT) U/L 7 7  --  9   GLUCOSE mg/dL 85 78 108* 94     BMI 22.47  Weight 61.2 kg    Vitals  Temp 97  HR 97  RR 16  /75  SpO2 100    Review of Systems   Constitutional:  Positive for appetite change, chills and fever.   HENT:  Negative for sore throat and trouble swallowing.    Respiratory:  Negative for chest tightness and shortness of breath.    Cardiovascular:  Negative for chest pain and palpitations.   Gastrointestinal:  Positive for nausea and vomiting. Negative for blood in stool and diarrhea.   Genitourinary:  Negative for dysuria.   Musculoskeletal:  Negative for arthralgias and back pain.   Skin:  Negative for rash and wound.   Neurological:  Negative for dizziness and confusion.   Psychiatric/Behavioral:  The patient is not nervous/anxious.         Physical Exam  Constitutional:       General: She is not in acute distress.     Appearance: She is not ill-appearing.   Eyes:      Extraocular Movements: Extraocular movements intact.      Pupils: Pupils are equal, round, and reactive to light.   Cardiovascular:      Rate and Rhythm: Normal rate.      Pulses: Normal pulses.   Pulmonary:      Effort: Pulmonary effort is normal.   Abdominal:      General: There is no distension.      Palpations: Abdomen is soft. There is no mass.      Tenderness: There is abdominal tenderness.  There is no guarding or rebound.      Hernia: No hernia is present.   Skin:     General: Skin is warm and dry.   Neurological:      General: No focal deficit present.      Mental Status: She is alert and oriented to person, place, and time.   Psychiatric:         Behavior: Behavior normal.                TRACI Corral   General and Endoscopic Surgery  [facility]    [address] [place] [address], [address]  [place], [place], [address]  P: [phone]  F: [phone]

## 2024-10-23 ENCOUNTER — ANESTHESIA (OUTPATIENT)
Dept: PERIOP | Facility: HOSPITAL | Age: 19
End: 2024-10-23

## 2024-10-23 ENCOUNTER — ANESTHESIA EVENT (OUTPATIENT)
Dept: PERIOP | Facility: HOSPITAL | Age: 19
End: 2024-10-23

## 2024-10-23 ENCOUNTER — APPOINTMENT (OUTPATIENT)
Dept: GENERAL RADIOLOGY | Facility: HOSPITAL | Age: 19
End: 2024-10-23
Payer: MEDICAID

## 2024-10-23 PROBLEM — K82.8 BILIARY DYSKINESIA: Status: ACTIVE | Noted: 2024-10-19

## 2024-10-23 LAB
ALBUMIN SERPL-MCNC: 3.4 G/DL (ref 3.5–5.2)
ALBUMIN/GLOB SERPL: 1.1 G/DL
ALP SERPL-CCNC: 54 U/L (ref 39–117)
ALT SERPL W P-5'-P-CCNC: 5 U/L (ref 1–33)
ANION GAP SERPL CALCULATED.3IONS-SCNC: 9.8 MMOL/L (ref 5–15)
AST SERPL-CCNC: 7 U/L (ref 1–32)
B-HCG UR QL: NEGATIVE
BILIRUB SERPL-MCNC: 0.2 MG/DL (ref 0–1.2)
BUN SERPL-MCNC: 5 MG/DL (ref 6–20)
BUN/CREAT SERPL: 9.4 (ref 7–25)
C TRACH RRNA SPEC QL NAA+PROBE: NEGATIVE
CALCIUM SPEC-SCNC: 8.6 MG/DL (ref 8.6–10.5)
CHLORIDE SERPL-SCNC: 104 MMOL/L (ref 98–107)
CO2 SERPL-SCNC: 21.2 MMOL/L (ref 22–29)
CREAT SERPL-MCNC: 0.53 MG/DL (ref 0.57–1)
CYTO UR: NORMAL
DEPRECATED RDW RBC AUTO: 39.3 FL (ref 37–54)
EGFRCR SERPLBLD CKD-EPI 2021: 136 ML/MIN/1.73
ERYTHROCYTE [DISTWIDTH] IN BLOOD BY AUTOMATED COUNT: 13 % (ref 12.3–15.4)
EXPIRATION DATE: NORMAL
GLOBULIN UR ELPH-MCNC: 3.2 GM/DL
GLUCOSE SERPL-MCNC: 72 MG/DL (ref 65–99)
HCT VFR BLD AUTO: 33 % (ref 34–46.6)
HGB BLD-MCNC: 10.6 G/DL (ref 12–15.9)
INTERNAL NEGATIVE CONTROL: NEGATIVE
INTERNAL POSITIVE CONTROL: POSITIVE
LAB AP CASE REPORT: NORMAL
Lab: NORMAL
MCH RBC QN AUTO: 26.8 PG (ref 26.6–33)
MCHC RBC AUTO-ENTMCNC: 32.1 G/DL (ref 31.5–35.7)
MCV RBC AUTO: 83.5 FL (ref 79–97)
N GONORRHOEA RRNA SPEC QL NAA+PROBE: NEGATIVE
PATH REPORT.ADDENDUM SPEC: NORMAL
PATH REPORT.FINAL DX SPEC: NORMAL
PATH REPORT.GROSS SPEC: NORMAL
PLATELET # BLD AUTO: 361 10*3/MM3 (ref 140–450)
PMV BLD AUTO: 9.3 FL (ref 6–12)
POTASSIUM SERPL-SCNC: 3.5 MMOL/L (ref 3.5–5.2)
PROT SERPL-MCNC: 6.6 G/DL (ref 6–8.5)
RBC # BLD AUTO: 3.95 10*6/MM3 (ref 3.77–5.28)
SODIUM SERPL-SCNC: 135 MMOL/L (ref 136–145)
WBC NRBC COR # BLD AUTO: 6.13 10*3/MM3 (ref 3.4–10.8)

## 2024-10-23 PROCEDURE — 25010000002 ONDANSETRON PER 1 MG: Performed by: INTERNAL MEDICINE

## 2024-10-23 PROCEDURE — 25010000002 HYDROMORPHONE 1 MG/ML SOLUTION: Performed by: STUDENT IN AN ORGANIZED HEALTH CARE EDUCATION/TRAINING PROGRAM

## 2024-10-23 PROCEDURE — 25010000002 CEFAZOLIN PER 500 MG: Performed by: STUDENT IN AN ORGANIZED HEALTH CARE EDUCATION/TRAINING PROGRAM

## 2024-10-23 PROCEDURE — 25810000003 SODIUM CHLORIDE 0.9 % SOLUTION: Performed by: INTERNAL MEDICINE

## 2024-10-23 PROCEDURE — 47563 LAPARO CHOLECYSTECTOMY/GRAPH: CPT | Performed by: STUDENT IN AN ORGANIZED HEALTH CARE EDUCATION/TRAINING PROGRAM

## 2024-10-23 PROCEDURE — 25010000002 HYDROMORPHONE PER 4 MG: Performed by: STUDENT IN AN ORGANIZED HEALTH CARE EDUCATION/TRAINING PROGRAM

## 2024-10-23 PROCEDURE — 47563 LAPARO CHOLECYSTECTOMY/GRAPH: CPT | Performed by: PHYSICIAN ASSISTANT

## 2024-10-23 PROCEDURE — 74300 X-RAY BILE DUCTS/PANCREAS: CPT

## 2024-10-23 PROCEDURE — 25010000002 SUGAMMADEX 200 MG/2ML SOLUTION: Performed by: STUDENT IN AN ORGANIZED HEALTH CARE EDUCATION/TRAINING PROGRAM

## 2024-10-23 PROCEDURE — G0378 HOSPITAL OBSERVATION PER HR: HCPCS

## 2024-10-23 PROCEDURE — 25010000002 LIDOCAINE 2% SOLUTION: Performed by: STUDENT IN AN ORGANIZED HEALTH CARE EDUCATION/TRAINING PROGRAM

## 2024-10-23 PROCEDURE — 25010000002 KETOROLAC TROMETHAMINE PER 15 MG: Performed by: STUDENT IN AN ORGANIZED HEALTH CARE EDUCATION/TRAINING PROGRAM

## 2024-10-23 PROCEDURE — 25010000002 ONDANSETRON PER 1 MG: Performed by: STUDENT IN AN ORGANIZED HEALTH CARE EDUCATION/TRAINING PROGRAM

## 2024-10-23 PROCEDURE — 25810000003 LACTATED RINGERS PER 1000 ML: Performed by: STUDENT IN AN ORGANIZED HEALTH CARE EDUCATION/TRAINING PROGRAM

## 2024-10-23 PROCEDURE — 25010000002 PROPOFOL 200 MG/20ML EMULSION: Performed by: STUDENT IN AN ORGANIZED HEALTH CARE EDUCATION/TRAINING PROGRAM

## 2024-10-23 PROCEDURE — 80053 COMPREHEN METABOLIC PANEL: CPT | Performed by: INTERNAL MEDICINE

## 2024-10-23 PROCEDURE — 88304 TISSUE EXAM BY PATHOLOGIST: CPT | Performed by: STUDENT IN AN ORGANIZED HEALTH CARE EDUCATION/TRAINING PROGRAM

## 2024-10-23 PROCEDURE — 99214 OFFICE O/P EST MOD 30 MIN: CPT | Performed by: NURSE PRACTITIONER

## 2024-10-23 PROCEDURE — 63710000001 PROMETHAZINE PER 25 MG: Performed by: STUDENT IN AN ORGANIZED HEALTH CARE EDUCATION/TRAINING PROGRAM

## 2024-10-23 PROCEDURE — 25010000002 FENTANYL CITRATE (PF) 50 MCG/ML SOLUTION: Performed by: STUDENT IN AN ORGANIZED HEALTH CARE EDUCATION/TRAINING PROGRAM

## 2024-10-23 PROCEDURE — 25010000002 DROPERIDOL PER 5 MG: Performed by: STUDENT IN AN ORGANIZED HEALTH CARE EDUCATION/TRAINING PROGRAM

## 2024-10-23 PROCEDURE — 81025 URINE PREGNANCY TEST: CPT | Performed by: STUDENT IN AN ORGANIZED HEALTH CARE EDUCATION/TRAINING PROGRAM

## 2024-10-23 PROCEDURE — 25010000002 DEXAMETHASONE SODIUM PHOSPHATE 20 MG/5ML SOLUTION: Performed by: STUDENT IN AN ORGANIZED HEALTH CARE EDUCATION/TRAINING PROGRAM

## 2024-10-23 PROCEDURE — 85027 COMPLETE CBC AUTOMATED: CPT | Performed by: INTERNAL MEDICINE

## 2024-10-23 PROCEDURE — 25010000002 MIDAZOLAM PER 1 MG: Performed by: STUDENT IN AN ORGANIZED HEALTH CARE EDUCATION/TRAINING PROGRAM

## 2024-10-23 PROCEDURE — 25510000001 IOPAMIDOL 61 % SOLUTION: Performed by: STUDENT IN AN ORGANIZED HEALTH CARE EDUCATION/TRAINING PROGRAM

## 2024-10-23 DEVICE — HORIZON TI ML 6 CLIPS/CART
Type: IMPLANTABLE DEVICE | Site: ABDOMEN | Status: FUNCTIONAL
Brand: WECK

## 2024-10-23 RX ORDER — HYDRALAZINE HYDROCHLORIDE 20 MG/ML
5 INJECTION INTRAMUSCULAR; INTRAVENOUS
Status: DISCONTINUED | OUTPATIENT
Start: 2024-10-23 | End: 2024-10-23 | Stop reason: HOSPADM

## 2024-10-23 RX ORDER — IPRATROPIUM BROMIDE AND ALBUTEROL SULFATE 2.5; .5 MG/3ML; MG/3ML
3 SOLUTION RESPIRATORY (INHALATION) ONCE AS NEEDED
Status: DISCONTINUED | OUTPATIENT
Start: 2024-10-23 | End: 2024-10-23 | Stop reason: HOSPADM

## 2024-10-23 RX ORDER — HYDROCODONE BITARTRATE AND ACETAMINOPHEN 5; 325 MG/1; MG/1
1 TABLET ORAL EVERY 6 HOURS PRN
Qty: 12 TABLET | Refills: 0 | Status: SHIPPED | OUTPATIENT
Start: 2024-10-23 | End: 2024-10-25

## 2024-10-23 RX ORDER — SODIUM CHLORIDE, SODIUM LACTATE, POTASSIUM CHLORIDE, CALCIUM CHLORIDE 600; 310; 30; 20 MG/100ML; MG/100ML; MG/100ML; MG/100ML
9 INJECTION, SOLUTION INTRAVENOUS CONTINUOUS
Status: DISCONTINUED | OUTPATIENT
Start: 2024-10-23 | End: 2024-10-23

## 2024-10-23 RX ORDER — SODIUM CHLORIDE 0.9 % (FLUSH) 0.9 %
3-10 SYRINGE (ML) INJECTION AS NEEDED
Status: DISCONTINUED | OUTPATIENT
Start: 2024-10-23 | End: 2024-10-23 | Stop reason: HOSPADM

## 2024-10-23 RX ORDER — MIDAZOLAM HYDROCHLORIDE 1 MG/ML
2 INJECTION INTRAMUSCULAR; INTRAVENOUS
Status: DISCONTINUED | OUTPATIENT
Start: 2024-10-23 | End: 2024-10-23 | Stop reason: HOSPADM

## 2024-10-23 RX ORDER — PROMETHAZINE HYDROCHLORIDE 25 MG/1
25 SUPPOSITORY RECTAL ONCE AS NEEDED
Status: DISCONTINUED | OUTPATIENT
Start: 2024-10-23 | End: 2024-10-23 | Stop reason: HOSPADM

## 2024-10-23 RX ORDER — ROCURONIUM BROMIDE 10 MG/ML
INJECTION, SOLUTION INTRAVENOUS AS NEEDED
Status: DISCONTINUED | OUTPATIENT
Start: 2024-10-23 | End: 2024-10-23 | Stop reason: SURG

## 2024-10-23 RX ORDER — EPHEDRINE SULFATE 50 MG/ML
5 INJECTION, SOLUTION INTRAVENOUS ONCE AS NEEDED
Status: DISCONTINUED | OUTPATIENT
Start: 2024-10-23 | End: 2024-10-23 | Stop reason: HOSPADM

## 2024-10-23 RX ORDER — SODIUM CHLORIDE 0.9 % (FLUSH) 0.9 %
3 SYRINGE (ML) INJECTION EVERY 12 HOURS SCHEDULED
Status: DISCONTINUED | OUTPATIENT
Start: 2024-10-23 | End: 2024-10-23 | Stop reason: HOSPADM

## 2024-10-23 RX ORDER — LABETALOL HYDROCHLORIDE 5 MG/ML
5 INJECTION, SOLUTION INTRAVENOUS
Status: DISCONTINUED | OUTPATIENT
Start: 2024-10-23 | End: 2024-10-23 | Stop reason: HOSPADM

## 2024-10-23 RX ORDER — ONDANSETRON 2 MG/ML
INJECTION INTRAMUSCULAR; INTRAVENOUS AS NEEDED
Status: DISCONTINUED | OUTPATIENT
Start: 2024-10-23 | End: 2024-10-23 | Stop reason: SURG

## 2024-10-23 RX ORDER — HYDROCODONE BITARTRATE AND ACETAMINOPHEN 5; 325 MG/1; MG/1
1 TABLET ORAL EVERY 4 HOURS PRN
Status: CANCELLED | OUTPATIENT
Start: 2024-10-23 | End: 2024-10-27

## 2024-10-23 RX ORDER — BUPIVACAINE HYDROCHLORIDE AND EPINEPHRINE 2.5; 5 MG/ML; UG/ML
INJECTION, SOLUTION INFILTRATION; PERINEURAL AS NEEDED
Status: DISCONTINUED | OUTPATIENT
Start: 2024-10-23 | End: 2024-10-23 | Stop reason: HOSPADM

## 2024-10-23 RX ORDER — FLUMAZENIL 0.1 MG/ML
0.2 INJECTION INTRAVENOUS AS NEEDED
Status: DISCONTINUED | OUTPATIENT
Start: 2024-10-23 | End: 2024-10-23 | Stop reason: HOSPADM

## 2024-10-23 RX ORDER — PROPOFOL 10 MG/ML
INJECTION, EMULSION INTRAVENOUS AS NEEDED
Status: DISCONTINUED | OUTPATIENT
Start: 2024-10-23 | End: 2024-10-23 | Stop reason: SURG

## 2024-10-23 RX ORDER — PROMETHAZINE HYDROCHLORIDE 25 MG/1
25 TABLET ORAL ONCE AS NEEDED
Status: DISCONTINUED | OUTPATIENT
Start: 2024-10-23 | End: 2024-10-23 | Stop reason: HOSPADM

## 2024-10-23 RX ORDER — DROPERIDOL 2.5 MG/ML
0.62 INJECTION, SOLUTION INTRAMUSCULAR; INTRAVENOUS
Status: COMPLETED | OUTPATIENT
Start: 2024-10-23 | End: 2024-10-23

## 2024-10-23 RX ORDER — PROMETHAZINE HYDROCHLORIDE 25 MG/1
25 TABLET ORAL EVERY 6 HOURS PRN
Status: DISCONTINUED | OUTPATIENT
Start: 2024-10-23 | End: 2024-10-24 | Stop reason: HOSPADM

## 2024-10-23 RX ORDER — LIDOCAINE HYDROCHLORIDE 20 MG/ML
INJECTION, SOLUTION INFILTRATION; PERINEURAL AS NEEDED
Status: DISCONTINUED | OUTPATIENT
Start: 2024-10-23 | End: 2024-10-23 | Stop reason: SURG

## 2024-10-23 RX ORDER — ONDANSETRON 2 MG/ML
4 INJECTION INTRAMUSCULAR; INTRAVENOUS ONCE AS NEEDED
Status: COMPLETED | OUTPATIENT
Start: 2024-10-23 | End: 2024-10-23

## 2024-10-23 RX ORDER — SCOLOPAMINE TRANSDERMAL SYSTEM 1 MG/1
1 PATCH, EXTENDED RELEASE TRANSDERMAL ONCE
Status: DISCONTINUED | OUTPATIENT
Start: 2024-10-23 | End: 2024-10-23

## 2024-10-23 RX ORDER — HYDROMORPHONE HYDROCHLORIDE 1 MG/ML
0.5 INJECTION, SOLUTION INTRAMUSCULAR; INTRAVENOUS; SUBCUTANEOUS
Status: DISCONTINUED | OUTPATIENT
Start: 2024-10-23 | End: 2024-10-23 | Stop reason: HOSPADM

## 2024-10-23 RX ORDER — FAMOTIDINE 10 MG/ML
20 INJECTION, SOLUTION INTRAVENOUS ONCE
Status: DISCONTINUED | OUTPATIENT
Start: 2024-10-23 | End: 2024-10-23

## 2024-10-23 RX ORDER — LIDOCAINE HYDROCHLORIDE 10 MG/ML
0.5 INJECTION, SOLUTION INFILTRATION; PERINEURAL ONCE AS NEEDED
Status: DISCONTINUED | OUTPATIENT
Start: 2024-10-23 | End: 2024-10-23 | Stop reason: HOSPADM

## 2024-10-23 RX ORDER — FENTANYL CITRATE 50 UG/ML
50 INJECTION, SOLUTION INTRAMUSCULAR; INTRAVENOUS
Status: DISCONTINUED | OUTPATIENT
Start: 2024-10-23 | End: 2024-10-23 | Stop reason: HOSPADM

## 2024-10-23 RX ORDER — DEXAMETHASONE SODIUM PHOSPHATE 4 MG/ML
INJECTION, SOLUTION INTRA-ARTICULAR; INTRALESIONAL; INTRAMUSCULAR; INTRAVENOUS; SOFT TISSUE AS NEEDED
Status: DISCONTINUED | OUTPATIENT
Start: 2024-10-23 | End: 2024-10-23 | Stop reason: SURG

## 2024-10-23 RX ORDER — IOPAMIDOL 612 MG/ML
INJECTION, SOLUTION INTRAVASCULAR AS NEEDED
Status: DISCONTINUED | OUTPATIENT
Start: 2024-10-23 | End: 2024-10-23 | Stop reason: HOSPADM

## 2024-10-23 RX ORDER — FENTANYL CITRATE 50 UG/ML
INJECTION, SOLUTION INTRAMUSCULAR; INTRAVENOUS AS NEEDED
Status: DISCONTINUED | OUTPATIENT
Start: 2024-10-23 | End: 2024-10-23 | Stop reason: SURG

## 2024-10-23 RX ORDER — NALOXONE HCL 0.4 MG/ML
0.2 VIAL (ML) INJECTION AS NEEDED
Status: DISCONTINUED | OUTPATIENT
Start: 2024-10-23 | End: 2024-10-23 | Stop reason: HOSPADM

## 2024-10-23 RX ORDER — HYDROCODONE BITARTRATE AND ACETAMINOPHEN 5; 325 MG/1; MG/1
1 TABLET ORAL ONCE AS NEEDED
Status: DISCONTINUED | OUTPATIENT
Start: 2024-10-23 | End: 2024-10-23 | Stop reason: HOSPADM

## 2024-10-23 RX ORDER — DIPHENHYDRAMINE HYDROCHLORIDE 50 MG/ML
12.5 INJECTION INTRAMUSCULAR; INTRAVENOUS
Status: DISCONTINUED | OUTPATIENT
Start: 2024-10-23 | End: 2024-10-23 | Stop reason: HOSPADM

## 2024-10-23 RX ORDER — KETOROLAC TROMETHAMINE 30 MG/ML
INJECTION, SOLUTION INTRAMUSCULAR; INTRAVENOUS AS NEEDED
Status: DISCONTINUED | OUTPATIENT
Start: 2024-10-23 | End: 2024-10-23 | Stop reason: SURG

## 2024-10-23 RX ORDER — OXYCODONE AND ACETAMINOPHEN 7.5; 325 MG/1; MG/1
1 TABLET ORAL EVERY 4 HOURS PRN
Status: DISCONTINUED | OUTPATIENT
Start: 2024-10-23 | End: 2024-10-23 | Stop reason: HOSPADM

## 2024-10-23 RX ADMIN — SODIUM CHLORIDE 75 ML/HR: 9 INJECTION, SOLUTION INTRAVENOUS at 04:48

## 2024-10-23 RX ADMIN — PANTOPRAZOLE SODIUM 40 MG: 40 TABLET, DELAYED RELEASE ORAL at 10:40

## 2024-10-23 RX ADMIN — SCOPALAMINE 1 PATCH: 1 PATCH, EXTENDED RELEASE TRANSDERMAL at 16:05

## 2024-10-23 RX ADMIN — PROPOFOL 200 MG: 10 INJECTION, EMULSION INTRAVENOUS at 17:19

## 2024-10-23 RX ADMIN — PROMETHAZINE HYDROCHLORIDE 25 MG: 25 TABLET ORAL at 23:35

## 2024-10-23 RX ADMIN — DROPERIDOL 0.62 MG: 2.5 INJECTION, SOLUTION INTRAMUSCULAR; INTRAVENOUS at 20:09

## 2024-10-23 RX ADMIN — ROCURONIUM BROMIDE 40 MG: 10 INJECTION, SOLUTION INTRAVENOUS at 17:19

## 2024-10-23 RX ADMIN — DROPERIDOL 0.62 MG: 2.5 INJECTION, SOLUTION INTRAMUSCULAR; INTRAVENOUS at 19:31

## 2024-10-23 RX ADMIN — HYDROMORPHONE HYDROCHLORIDE 0.5 MG: 0.5 INJECTION, SOLUTION INTRAMUSCULAR; INTRAVENOUS; SUBCUTANEOUS at 18:16

## 2024-10-23 RX ADMIN — SUCRALFATE 1 G: 1 TABLET ORAL at 10:39

## 2024-10-23 RX ADMIN — HYDROMORPHONE HYDROCHLORIDE 0.5 MG: 1 INJECTION, SOLUTION INTRAMUSCULAR; INTRAVENOUS; SUBCUTANEOUS at 19:46

## 2024-10-23 RX ADMIN — DEXAMETHASONE SODIUM PHOSPHATE 8 MG: 4 INJECTION, SOLUTION INTRAMUSCULAR; INTRAVENOUS at 17:22

## 2024-10-23 RX ADMIN — HYDROMORPHONE HYDROCHLORIDE 0.5 MG: 1 INJECTION, SOLUTION INTRAMUSCULAR; INTRAVENOUS; SUBCUTANEOUS at 19:08

## 2024-10-23 RX ADMIN — SUGAMMADEX 200 MG: 100 INJECTION, SOLUTION INTRAVENOUS at 18:09

## 2024-10-23 RX ADMIN — MIDAZOLAM 2 MG: 1 INJECTION INTRAMUSCULAR; INTRAVENOUS at 16:05

## 2024-10-23 RX ADMIN — KETOROLAC TROMETHAMINE 30 MG: 30 INJECTION, SOLUTION INTRAMUSCULAR; INTRAVENOUS at 17:39

## 2024-10-23 RX ADMIN — SODIUM CHLORIDE 2000 MG: 900 INJECTION INTRAVENOUS at 17:05

## 2024-10-23 RX ADMIN — ONDANSETRON 4 MG: 2 INJECTION INTRAMUSCULAR; INTRAVENOUS at 11:46

## 2024-10-23 RX ADMIN — HYDROCODONE BITARTRATE AND ACETAMINOPHEN 1 TABLET: 5; 325 TABLET ORAL at 23:35

## 2024-10-23 RX ADMIN — ONDANSETRON 4 MG: 2 INJECTION INTRAMUSCULAR; INTRAVENOUS at 18:10

## 2024-10-23 RX ADMIN — SODIUM CHLORIDE, POTASSIUM CHLORIDE, SODIUM LACTATE AND CALCIUM CHLORIDE 9 ML/HR: 600; 310; 30; 20 INJECTION, SOLUTION INTRAVENOUS at 16:07

## 2024-10-23 RX ADMIN — LIDOCAINE HYDROCHLORIDE 100 MG: 20 INJECTION, SOLUTION INFILTRATION; PERINEURAL at 17:17

## 2024-10-23 RX ADMIN — ONDANSETRON 4 MG: 2 INJECTION, SOLUTION INTRAMUSCULAR; INTRAVENOUS at 19:15

## 2024-10-23 RX ADMIN — HYDROCODONE BITARTRATE AND ACETAMINOPHEN 1 TABLET: 5; 325 TABLET ORAL at 10:39

## 2024-10-23 RX ADMIN — HYDROMORPHONE HYDROCHLORIDE 0.5 MG: 0.5 INJECTION, SOLUTION INTRAMUSCULAR; INTRAVENOUS; SUBCUTANEOUS at 18:12

## 2024-10-23 RX ADMIN — FENTANYL CITRATE 50 MCG: 50 INJECTION, SOLUTION INTRAMUSCULAR; INTRAVENOUS at 17:16

## 2024-10-23 NOTE — PROGRESS NOTES
Small bowel biopsies were negative for celiac  Mild inflammation seen on gastric biopsies.  Negative for H. pylori.  Recommend pantoprazole daily for 1 month.  Avoid NSAIDs.

## 2024-10-23 NOTE — OP NOTE
Operative Note :  Lala Acosta MD    Patient Name and :  Dariel Schilling  2005    Procedure Date:   10/23/24    Pre-op Diagnosis:  Biliary dyskinesia [K82.8]    Post-Operative Diagnosis:  Biliary dyskinesia    Procedure:   Laparoscopic cholecystectomy with intraoperative cholangiogram    Surgeon:   Lala Acosta MD    Assistant:   Assistant: Romeo Hickey PA-C was responsible for performing the following activities: Retraction, Suturing, Closing, Placing Dressing, and Held/Positioned Camera and their skilled assistance was necessary for the success of this case.    Anesthesia:    General (general endotracheal tube)    Estimated Blood Loss:   minimal    Specimens:   Gallbladder    Complications:   None    Findings:   Normal-appearing gallbladder with critical view obtained prior to IOC  IOC demonstrating flow of contrast into the cystic duct, common bile duct, bilateral hepatic ducts, and duodenum without filling defect or delay.  There was some extravasation out of the cystic duct.  No evidence of bleeding or leak from the cystic plate or cystic duct or cystic artery following division of these structures    Description of procedure:  After informed consent was obtained, the patient was brought to the operating room and placed supine on the operating table.  Anesthesia was induced.  The abdomen was prepped and draped in usual sterile fashion.  A timeout was performed.      The abdomen was entered using a direct optical view technique with a 5 mm trocar in the right upper quadrant.  Local anesthetic was injected and the 11 blade was used to make a 5 mm incision through which the trocar was advanced.  All layers of the abdominal wall were visualized as the trocar was advanced into the peritoneal cavity.  The abdomen was insufflated and inspected and there was no evidence of injury from entry.  Subsequently a 12 mm subxiphoid port and two 5 mm right subcostal ports were placed under direct  visualization following local anesthetic injection.    The gallbladder was grasped with an atraumatic grasper and lifted over the dome of the liver.  The infudibular dissection was performed using a combination of [place] graspers and bovie electrocautery to reveal a critical view including a single cystic duct and single cystic artery with 2 early branches, with the bottom 1/3 of the cystic plate cleared prior to intraoperative cholangiogram.      The infundibulum was clipped with a large clip and the scissors used to partially transect the cystic duct.  Bile was noted to be flowing from the cystic duct.  The cholangiocatheter was advanced through the abdominal wall and into the cystic duct and clipped in place with a second large clip.    An intraoperative cholangiogram was performed and demonstrated filling of the bilateral hepatic ducts, common bile duct, and duodenum without filling defects.  There was a small amount of extravasation of contrast dye from the cystic duct.    Following cholangiogram, the cystic duct and cystic artery were clipped and divided with scissors.  Then the gallbladder was removed from the cystic plate using Bovie electrocautery.  It was placed within an Endo Catch bag and removed from the abdomen via the subxiphoid incision and sent to pathology as specimen.  The liver bed was inspected and there was no evidence of bile leak or bleeding, and the clips appeared in good position.  Suction  was used to suction scant bile and blood from the field.  The ports were removed under direct visualization.  The subxiphoid port site was closed with 0 Vicryl and then all skin incisions were closed using 4-0 Monocryl.  The operative field was cleaned and dried and exofin dressing placed.  All counts were correct at the end of the case.  The patient was awoken and transferred to PACU in stable condition.      Lala Acosta MD  General Surgery  [facility] [address], [address]  [place] [address]  P: [phone]  F: [phone]

## 2024-10-23 NOTE — PROGRESS NOTES
"General Surgery Progress Note    CC: biliary dyskinesia    S: Patient states she is feeling a little better today. Tried some clear liquids last night and tolerated them. Still having RUQ pain. Had a bowel movement yesterday.    O:/82   Pulse 116   Temp 97.8 °F (36.6 °C) (Oral)   Resp 16   Ht 165.1 cm (65\")   Wt 61.2 kg (135 lb)   LMP 10/13/2024 (Approximate)   SpO2 100%   BMI 22.47 kg/m²     Intake & Output (last day)         10/22 0701  10/23 0700 10/23 0701  10/24 0700    I.V. (mL/kg) 1117 (18.3) 620 (10.1)    Total Intake(mL/kg) 1117 (18.3) 620 (10.1)    Net +1117 +620          Urine Unmeasured Occurrence 3 x     Stool Unmeasured Occurrence 1 x             GENERAL: awake, alert, interactive, cooperative  HEENT: EOMI, clear sclera, moist mucus membranes   CHEST: normal work of breathing on room air  CARDIAC: well perfused  GI: Abd soft, tender in the RUQ without rebound, guarding, or campbell's sign, no scars noted  EXTREMITIES: LIZAMA, no cyanosis or edema    SKIN: Warm and dry, no rash    LABS  Results from last 7 days   Lab Units 10/23/24  0435 10/22/24  0302 10/21/24  0623   WBC 10*3/mm3 6.13 8.13 9.97   HEMOGLOBIN g/dL 10.6* 10.4* 11.1*   HEMATOCRIT % 33.0* 33.5* 35.9   PLATELETS 10*3/mm3 361 339 334     Results from last 7 days   Lab Units 10/23/24  0435 10/22/24  0302 10/21/24  0623   SODIUM mmol/L 135* 139 134*   POTASSIUM mmol/L 3.5 3.8 3.6   CHLORIDE mmol/L 104 107 101   CO2 mmol/L 21.2* 24.9 20.8*   BUN mg/dL 5* 5* 5*   CREATININE mg/dL 0.53* 0.53* 0.61   CALCIUM mg/dL 8.6 9.1 8.9   BILIRUBIN mg/dL 0.2 0.3 0.3   ALK PHOS U/L 54 56 59   ALT (SGPT) U/L 5 <5 <5   AST (SGOT) U/L 7 7 7   GLUCOSE mg/dL 72 85 78           A/P: 19 y.o. female with RUQ pain, nausea, vomiting, and concern for biliary dyskinesia and possible chronic cholecystisis with gallbladder EF 22% and otherwise normal gallbladder on CT and RUQ US.    I discussed with the patient management options for biliary dyskinesia including " nonoperative management with avoidance of food triggers and NSAIDs for pain relief, versus surgery. I recommended laparoscopic possible open cholecystectomy with intraoperative cholangiogram given the chronicity of her symptoms and suspected possible biliary dyskinesia or chronic cholecystitis. I discussed the nature of the procedure, the risks (including but not limited to bleeding, infection, injury to surrounding structures, risk for conversion to open procedure, risk of injury to the biliary tree or postoperative bile leak, changes in bowel function accompanying cholecystectomy), benefits, alternatives, and postoperative expectations including recommendations for postop diet, wound care, activity restrictions, and pain control.  All questions were answered.  The patient wishes to proceed with surgery.      Lala Acosta MD  General, Robotic and Endoscopic Surgery  [facility]    [address], [address]  [place], [place], [address]  P: [phone]  F: [phone]

## 2024-10-23 NOTE — PLAN OF CARE
Goal Outcome Evaluation:  Plan of Care Reviewed With: patient        Progress: no change  Outcome Evaluation: NPO this AM.  up ad clayton.  norco x1 for abd pain and zofran x1 for nausea.  to OR this afternoon for lap pooja.  remains EARLENE at present.

## 2024-10-23 NOTE — PROGRESS NOTES
" LOS: 0 days     Name: Dariel Schilling  Age: 19 y.o.  Sex: female  :  2005  MRN: [id number]         Primary Care Physician: System, Provider Not In    Subjective   Subjective  She reports resolution of her headache.  After receiving migraine cocktail yesterday she felt \"funny\" with some generalized tingling.  This resolved on its own yesterday.  Currently n.p.o. and tentatively going for laparoscopic cholecystectomy today.    Objective   Vital Signs  Temp:  [97 °F (36.1 °C)-99 °F (37.2 °C)] 98.3 °F (36.8 °C)  Heart Rate:  [] 106  Resp:  [16-18] 16  BP: ()/(61-76) 114/76  Body mass index is 22.47 kg/m².    Objective:  General Appearance:  Comfortable and in no acute distress.    Vital signs: (most recent): Blood pressure 114/76, pulse 106, temperature 98.3 °F (36.8 °C), temperature source Oral, resp. rate 16, height 165.1 cm (65\"), weight 61.2 kg (135 lb), last menstrual period 10/13/2024, SpO2 100%.    Lungs:  Normal effort and normal respiratory rate.    Heart: Normal rate.  Regular rhythm.    Abdomen: Abdomen is soft.  Bowel sounds are normal.   There is no abdominal tenderness.     Extremities: There is no dependent edema or local swelling.    Neurological: Patient is alert and oriented to person, place and time.    Skin:  Warm and dry.                Results Review:       I reviewed the patient's new clinical results.    Results from last 7 days   Lab Units 10/23/24  0435 10/22/24  0302 10/21/24  0623 10/20/24  0405 10/19/24  1623 10/19/24  0500   WBC 10*3/mm3 6.13 8.13 9.97 16.49* 11.99* 8.71   HEMOGLOBIN g/dL 10.6* 10.4* 11.1* 10.8* 12.0 11.8*   PLATELETS 10*3/mm3 361 339 334 325 325 330     Results from last 7 days   Lab Units 10/23/24  0435 10/22/24  0302 10/21/24  0623 10/20/24  0405 10/19/24  1623 10/19/24  0500   SODIUM mmol/L 135* 139 134* 136 136 138   POTASSIUM mmol/L 3.5 3.8 3.6 3.8 3.6 3.6   CHLORIDE mmol/L 104 107 101 105 103 103   CO2 mmol/L 21.2* 24.9 20.8* 19.5* 22.2 24.0 "   BUN mg/dL 5* 5* 5* 5* 5* 6   CREATININE mg/dL 0.53* 0.53* 0.61 0.60 0.79 0.67   CALCIUM mg/dL 8.6 9.1 8.9 8.8 9.0 9.4   GLUCOSE mg/dL 72 85 78 108* 94 97                 Scheduled Meds:   montelukast, 10 mg, Oral, Nightly  pantoprazole, 40 mg, Oral, BID AC  sucralfate, 1 g, Oral, 4x Daily AC & at Bedtime      PRN Meds:     acetaminophen **OR** acetaminophen **OR** acetaminophen    albuterol    senna-docusate sodium **AND** polyethylene glycol **AND** bisacodyl **AND** bisacodyl    HYDROcodone-acetaminophen    Morphine    ondansetron    [COMPLETED] Insert Peripheral IV **AND** sodium chloride  Continuous Infusions:  sodium chloride, 75 mL/hr, Last Rate: 75 mL/hr (10/23/24 0750)        Assessment & Plan   Active Hospital Problems    Diagnosis  POA    SIRS (systemic inflammatory response syndrome) [R65.10]  Yes    Intractable abdominal pain [R10.9]  Yes    Asthma [J45.909]  Yes    Nausea and vomiting [R11.2]  Yes    Chronic abdominal pain [R10.9, G89.29]  Yes      Resolved Hospital Problems   No resolved problems to display.       Assessment & Plan    -General Surgery indicates tentative plan for laparoscopic cholecystectomy today.  She is currently NPO.  -Continue PPI and Carafate  -Continue supportive measures with IV fluids and antiemetics  -Headache better after migraine cocktail yesterday.  She did have some generalized tingling after receiving this but this resolved quickly on its own.        Expected Discharge Date: 10/24/2024; Expected Discharge Time:      Kendrick Swain MD  [facility]  10/23/24  09:15 EDT

## 2024-10-23 NOTE — PLAN OF CARE
Goal Outcome Evaluation:  Plan of Care Reviewed With: patient        Progress: improving  Outcome Evaluation: ivf, no c/o's pain, dr molina  called and said to keep pt npo for poss sx in am-pt and mother aware, vdg, walked, slept throughout the night

## 2024-10-23 NOTE — DISCHARGE INSTRUCTIONS
Lala Acosta MD  [address] [facility] [address]  [place] [address]  [phone]    Discharge Instructions: Gall Bladder Surgery    Go home, rest and take it easy today; however, you should get up and move about several times today to reduce the risk of developing a blood clot in your legs.   You may experience some dizziness or memory loss from the anesthesia. This may last for the next 24 hours. Someone should plan on staying with you for the first 24 hours for your safety.   Do not make any important legal decisions or sign any legal papers for the next 24 hours.   Eat and drink lightly today. Start off with liquids, jello, soup, crackers or other bland foods at first. You may advance your diet tomorrow as tolerated as long as you do not experience any nausea or vomiting.   Your incisions are covered with skin glue. This will fall off on its own in 1-2 weeks. Do not worry if it peels off sooner.   You may notice some bleeding/drainage on your outer dressings. A little bloody drainage is normal. If the bleeding/drainage is such that the bandage cannot absorb it, remove the dressing, apply clean gauze and apply firm pressure for a full 15 minutes. If the bleeding continues, please call me.   You may shower 24 hours after surgery. No tub baths or swimming for at least two weeks and until your incisions are completely healed.   You have received a prescription for a narcotic pain medicine, as you will have some pain following surgery.  You will not be totally pain free, but your pain medicine should make the pain tolerable. Please take your pain medicine as prescribed and always take your pills with food to prevent nausea. If you are having severe pain that cannot be controlled by the pain medicine, please contact me.   Narcotic pain medicine can cause constipation. Take an over the counter stool softener, like Miaralax or docusate, to prevent constipation while taking narcotics.  You have also received a  prescription for an anti-nausea medicine. Please take this as prescribed for any nausea or vomiting. Nausea could be a result of the anesthesia or a result of the narcotic pain medicine. If you experience severe nausea and vomiting that cannot be controlled by the nausea medicine, please call me.   It is not unusual to experience pain/discomfort in your shoulders or your ribs after surgery. It is from the gas used during the laparoscopic procedure and usually lasts 1-3 days. The prescription pain medicine is used to treat the surgical incisional pain and does not typically alleviate this “gassy” pain.   No driving for 24 hours and for as long as you are taking your prescription pain medicine.   You will need to call the office at [phone] to schedule a follow-up appointment in 2 weeks.   Remember to contact me for any of the following:   Fever greater than 101 degrees  Severe pain that cannot be controlled by taking your pain pills  Severe nausea or vomiting that cannot be controlled by taking your nausea pills  Significant bleeding of your incisions  Drainage that has a bad smell or is yellow or green in appearance  Any other questions or concerns    - Follow up with PCP in 3-5 days  - Follow up with Dr. Acosta within two weeks  - Follow up with Dr. Parmar within one month  - Take Norco 5/325mg PO q6h PRN pain  - Take protonix 40mg PO BID x 30 days  - Take Carafate 1g by mouth four times a day before meals and at bedtime

## 2024-10-23 NOTE — ANESTHESIA PROCEDURE NOTES
Airway  Urgency: elective    Date/Time: 10/23/2024 5:21 PM  Airway not difficult    General Information and Staff    Patient location during procedure: OR  Anesthesiologist: Qyuen Santillan MD  CRNA/CAA: Romeo Medina CRNA    Indications and Patient Condition  Indications for airway management: airway protection    Preoxygenated: yes  Mask difficulty assessment: 1 - vent by mask    Final Airway Details  Final airway type: endotracheal airway      Successful airway: ETT  Cuffed: yes   Successful intubation technique: direct laryngoscopy  Endotracheal tube insertion site: oral  Blade: Olvera  Blade size: 2  ETT size (mm): 6.5  Cormack-Lehane Classification: grade I - full view of glottis  Placement verified by: chest auscultation and capnometry   Measured from: lips  Number of attempts at approach: 1  Assessment: lips, teeth, and gum same as pre-op and atraumatic intubation

## 2024-10-23 NOTE — ANESTHESIA PREPROCEDURE EVALUATION
" Anesthesia Evaluation     Patient summary reviewed and Nursing notes reviewed   history of anesthetic complications (lip abrasion after EGD):   NPO Solid Status: > 8 hours  NPO Liquid Status: > 2 hours           Airway   Mallampati: I  TM distance: >3 FB  Neck ROM: full  No difficulty expected  Dental      Comment: Upper and Lower Braces    Pulmonary - normal exam   (+) asthma (mild/exercise induced),  (-) COPD  Cardiovascular   Exercise tolerance: good (4-7 METS)    Rhythm: regular    (-) past MI, angina, cardiac stents      Neuro/Psych  (-) seizures, CVA  GI/Hepatic/Renal/Endo    (-) GERD, liver disease, no renal disease    Musculoskeletal     Abdominal    Substance History - negative use     OB/GYN          Other   blood dyscrasia anemia,                   Anesthesia Plan    ASA 2     general     (Scop patch    I have reviewed the patient's history and chart with the patient, including all pertinent laboratory results and imaging. I have explained the risks of anesthesia including but not limited to dental or airway injury, nausea, cardio-pulmonary complications, such as aspiration, MI, stroke, or death.     VITALS:  /77 (BP Location: Right arm, Patient Position: Sitting)   Pulse 80   Temp 36.6 °C (97.8 °F) (Oral)   Resp 16   Ht 165.1 cm (65\")   Wt 61.2 kg (135 lb)   LMP 10/13/2024 (Approximate)   SpO2 100%   BMI 22.47 kg/m² )  intravenous induction     Anesthetic plan, risks, benefits, and alternatives have been provided, discussed and informed consent has been obtained with: patient.  Pre-procedure education provided    CODE STATUS:    Code Status (Patient has no pulse and is not breathing): CPR (Attempt to Resuscitate)  Medical Interventions (Patient has pulse or is breathing): Full Support      "

## 2024-10-23 NOTE — ANESTHESIA POSTPROCEDURE EVALUATION
Patient: Dariel Schilling    Procedure Summary       Date: 10/23/24 Room / Location: [facility] OR [address] [place] [address] MAIN OR    Anesthesia Start: 1709 Anesthesia Stop: 1834    Procedure: CHOLECYSTECTOMY LAPAROSCOPIC INTRAOPERATIVE CHOLANGIOGRAM (Abdomen) Diagnosis:       Biliary dyskinesia      (Biliary dyskinesia [K82.8])    Surgeons: Lala Acosta MD Provider: Quyen Santillan MD    Anesthesia Type: general ASA Status: 2            Anesthesia Type: general    Vitals  Vitals Value Taken Time   /82 10/23/24 1930   Temp 36.5 °C (97.7 °F) 10/23/24 1832   Pulse 81 10/23/24 1944   Resp 20 10/23/24 1930   SpO2 100 % 10/23/24 1944   Vitals shown include unfiled device data.        Post Anesthesia Care and Evaluation    Patient location during evaluation: bedside  Patient participation: complete - patient participated  Level of consciousness: awake and alert  Pain management: adequate    Airway patency: patent  Anesthetic complications: No anesthetic complications  PONV Status: controlled  Cardiovascular status: acceptable  Respiratory status: acceptable  Hydration status: acceptable

## 2024-10-23 NOTE — PROGRESS NOTES
[facility]  Inpatient Progress Note    Reason for Follow Up: Nausea, vomiting and abdominal pain    Subjective     Interval History:     No acute events overnight.  Denies abdominal pain at the moment.  No nausea or vomiting.  Currently NPO.    Patient with abnormal HIDA scan potential laparoscopic cholecystectomy today.    Hemoglobin this morning 10.6 with normal LFTs      Current Facility-Administered Medications:     acetaminophen (TYLENOL) tablet 650 mg, 650 mg, Oral, Q4H PRN, 650 mg at 10/20/24 0920 **OR** acetaminophen (TYLENOL) 160 MG/5ML oral solution 650 mg, 650 mg, Oral, Q4H PRN **OR** acetaminophen (TYLENOL) suppository 650 mg, 650 mg, Rectal, Q4H PRN, Maryam Dexter MD    albuterol (PROVENTIL) nebulizer solution 0.083% 2.5 mg/3mL, 2.5 mg, Nebulization, Q6H PRN, Maryam Dexter MD    sennosides-docusate (PERICOLACE) 8.6-50 MG per tablet 2 tablet, 2 tablet, Oral, BID PRN **AND** polyethylene glycol (MIRALAX) packet 17 g, 17 g, Oral, Daily PRN **AND** bisacodyl (DULCOLAX) EC tablet 5 mg, 5 mg, Oral, Daily PRN **AND** bisacodyl (DULCOLAX) suppository 10 mg, 10 mg, Rectal, Daily PRN, Maryam Dexter MD    HYDROcodone-acetaminophen (NORCO) 5-325 MG per tablet 1 tablet, 1 tablet, Oral, Q4H PRN, Jono Mijares MD, 1 tablet at 10/22/24 0916    montelukast (SINGULAIR) tablet 10 mg, 10 mg, Oral, Nightly, StingMaryam puga MD, 10 mg at 10/22/24 2033    morphine injection 2 mg, 2 mg, Intravenous, Q4H PRN, Maryam Dexter MD, 2 mg at 10/20/24 0320    ondansetron (ZOFRAN) injection 4 mg, 4 mg, Intravenous, Q6H PRN, Maryam Dexter MD, 4 mg at 10/22/24 1635    pantoprazole (PROTONIX) EC tablet 40 mg, 40 mg, Oral, BID AC, Kendrick Swain MD, 40 mg at 10/22/24 1829    [COMPLETED] Insert Peripheral IV, , , Once **AND** sodium chloride 0.9 % flush 10 mL, 10 mL, Intravenous, PRN, Anderson Zelaya, PA    sodium chloride 0.9 % infusion, 75  mL/hr, Intravenous, Continuous, Kendrick Swain MD, Last Rate: 75 mL/hr at 10/23/24 0750, 75 mL/hr at 10/23/24 0750    sucralfate (CARAFATE) tablet 1 g, 1 g, Oral, 4x Daily AC & at Bedtime, Kendrick Swain MD, 1 g at 10/22/24 2033  Review of Systems:    The following systems were reviewed and negative;  gastrointestinal    Objective     Vital Signs  Temp:  [97 °F (36.1 °C)-99 °F (37.2 °C)] 98.3 °F (36.8 °C)  Heart Rate:  [] 106  Resp:  [16-18] 16  BP: ()/(61-76) 114/76  Body mass index is 22.47 kg/m².    Intake/Output Summary (Last 24 hours) at 10/23/2024 0913  Last data filed at 10/23/2024 0448  Gross per 24 hour   Intake 1117 ml   Output --   Net 1117 ml     No intake/output data recorded.     Physical Exam:   General: patient awake, alert and cooperative   Abdomen: soft, nontender, nondistended; normal bowel sounds      Results Review:     I reviewed the patient's new clinical results.    Results from last 7 days   Lab Units 10/23/24  0435 10/22/24  0302 10/21/24  0623   WBC 10*3/mm3 6.13 8.13 9.97   HEMOGLOBIN g/dL 10.6* 10.4* 11.1*   HEMATOCRIT % 33.0* 33.5* 35.9   PLATELETS 10*3/mm3 361 339 334     Results from last 7 days   Lab Units 10/23/24  0435 10/22/24  0302 10/21/24  0623   SODIUM mmol/L 135* 139 134*   POTASSIUM mmol/L 3.5 3.8 3.6   CHLORIDE mmol/L 104 107 101   CO2 mmol/L 21.2* 24.9 20.8*   BUN mg/dL 5* 5* 5*   CREATININE mg/dL 0.53* 0.53* 0.61   CALCIUM mg/dL 8.6 9.1 8.9   BILIRUBIN mg/dL 0.2 0.3 0.3   ALK PHOS U/L 54 56 59   ALT (SGPT) U/L 5 <5 <5   AST (SGOT) U/L 7 7 7   GLUCOSE mg/dL 72 85 78         Lab Results   Lab Value Date/Time    LIPASE 15 10/19/2024 0500    LIPASE 21 09/27/2024 0558    LIPASE 17 06/22/2024 0043    LIPASE 12 (L) 04/27/2024 2348    LIPASE 17 12/01/2023 0729    LIPASE 11 11/28/2023 1121    LIPASE 20 08/21/2023 1851    LIPASE 9 03/16/2023 1048    LIPASE 25 03/04/2023 0515    LIPASE 15 (L) 01/09/2023 2108    LIPASE 18 (L) 10/01/2022 0423     LIPASE 17 (L) 09/03/2022 0622       Radiology:  NM HIDA SCAN WITH PHARMACOLOGICAL INTERVENTION   Final Result       1. Patent cystic duct.   2. Patent CBD.   3. Gallbladder ejection fraction measures 22% and is diminished. Finding   can be seen with chronic cholecystitis or functional gallbladder   dysfunction               This report was finalized on 10/22/2024 10:27 AM by Dr. Raoul Corw M.D on Workstation: [id number]          US Abdomen Limited   Final Result          Assessment & Plan     Active Hospital Problems    Diagnosis     SIRS (systemic inflammatory response syndrome)     Intractable abdominal pain     Asthma     Nausea and vomiting     Chronic abdominal pain        Assessment:  Generalized abdominal pain, worse in the upper  Nausea and vomiting  Abnormal HIDA scan  Erosive gastritis on EGD    Plan:  Lap pooja planned for this afternoon  Continue n.p.o. status  Continue PPI and Carafate as an outpatient  Recommend she follow-up with our office in 2 to 3 weeks postdischarge  We will sign off but remain available if needed    I discussed the patients findings and my recommendations with patient and Dr. Parmar .    TRACI Montaño

## 2024-10-24 ENCOUNTER — TELEPHONE (OUTPATIENT)
Dept: SURGERY | Facility: CLINIC | Age: 19
End: 2024-10-24
Payer: MEDICAID

## 2024-10-24 ENCOUNTER — TELEPHONE (OUTPATIENT)
Dept: GASTROENTEROLOGY | Facility: CLINIC | Age: 19
End: 2024-10-24
Payer: MEDICAID

## 2024-10-24 ENCOUNTER — READMISSION MANAGEMENT (OUTPATIENT)
Dept: CALL CENTER | Facility: HOSPITAL | Age: 19
End: 2024-10-24
Payer: MEDICAID

## 2024-10-24 VITALS
OXYGEN SATURATION: 100 % | HEIGHT: 65 IN | WEIGHT: 135 LBS | HEART RATE: 92 BPM | BODY MASS INDEX: 22.49 KG/M2 | RESPIRATION RATE: 16 BRPM | SYSTOLIC BLOOD PRESSURE: 106 MMHG | TEMPERATURE: 97.5 F | DIASTOLIC BLOOD PRESSURE: 69 MMHG

## 2024-10-24 PROCEDURE — G0378 HOSPITAL OBSERVATION PER HR: HCPCS

## 2024-10-24 PROCEDURE — 99024 POSTOP FOLLOW-UP VISIT: CPT

## 2024-10-24 RX ORDER — SUCRALFATE 1 G/1
1 TABLET ORAL
Qty: 120 TABLET | Refills: 0 | Status: SHIPPED | OUTPATIENT
Start: 2024-10-24

## 2024-10-24 RX ORDER — PANTOPRAZOLE SODIUM 40 MG/1
40 TABLET, DELAYED RELEASE ORAL
Qty: 60 TABLET | Refills: 0 | Status: SHIPPED | OUTPATIENT
Start: 2024-10-24

## 2024-10-24 RX ADMIN — SUCRALFATE 1 G: 1 TABLET ORAL at 06:16

## 2024-10-24 RX ADMIN — PANTOPRAZOLE SODIUM 40 MG: 40 TABLET, DELAYED RELEASE ORAL at 06:16

## 2024-10-24 NOTE — PLAN OF CARE
Goal Outcome Evaluation:  Plan of Care Reviewed With: patient        Progress: improving  Outcome Evaluation: recieved pt from Clari DEAN at 2300, pt is alert and oriented, c/o incisional pain and nausea medicated with norco and phenergan, voiding freely, encourage to increase fluid intake and to use incentive spirometer, mother at bedside, 3 lap site with glue, continue to monitor the pt.

## 2024-10-24 NOTE — TELEPHONE ENCOUNTER
----- Message from Viktoriya Parmar sent at 10/23/2024  3:32 PM EDT -----  Small bowel biopsies were negative for celiac  Mild inflammation seen on gastric biopsies.  Negative for H. pylori.  Recommend pantoprazole daily for 1 month.  Avoid NSAIDs.

## 2024-10-24 NOTE — NURSING NOTE
VSS< lap sites x 3 dry & intact with glue, up ad [place]ton, voiding w/o difficulty, medicated for pain x 1 with relief, d/c home

## 2024-10-24 NOTE — DISCHARGE SUMMARY
Patient Name: Dariel Schilling  : 2005  MRN: [id number]    Date of Admission: 10/19/2024  Date of Discharge:  10/24/2024  Primary Care Physician: System, Provider Not In      Chief Complaint:   Fever, Nausea, Vomiting, and Abdominal Pain      Discharge Diagnoses     Active Hospital Problems    Diagnosis  POA    **Biliary dyskinesia [K82.8]  Unknown    SIRS (systemic inflammatory response syndrome) [R65.10]  Yes    Intractable abdominal pain [R10.9]  Yes    Asthma [J45.909]  Yes    Nausea and vomiting [R11.2]  Yes    Chronic abdominal pain [R10.9, G89.29]  Yes      Resolved Hospital Problems   No resolved problems to display.        Hospital Course     Ms. Schilling is a 19 y.o. female who presented to [facility] on 10/19/2024 due to abdominal pain.  GI consulted, EGD showed erosive gastritis that was biopsied.  HIDA revealed chronic cholecystitis, surgery consulted.  Laparoscopic cholecystectomy peformed on 10/23/2024 without complication and patient was cleared for dsicharged by surgery on 10/24/2024.  Patient was asymptomatic and agreeable with discharge plan below.    #Discharge Plan    - Follow up with PCP in 3-5 days  - Follow up with Dr. Acosta within two weeks  - Follow up with Dr. Parmar within one month  - Take Norco 5/325mg PO q6h PRN pain  - Take protonix 40mg PO BID x 30 days  - Take Carafate 1g by mouth four times a day before meals and at bedtime    Day of Discharge     Subjective:  10/24/2024  Patient feels at baseline and is eager to go home.  Personally discussed with surgery and cleared for discharge.      Physical Exam:  Temp:  [97.2 °F (36.2 °C)-97.8 °F (36.6 °C)] 97.4 °F (36.3 °C)  Heart Rate:  [] 76  Resp:  [16-20] 16  BP: ()/(60-94) 104/60  Body mass index is 22.47 kg/m².  Physical Exam  Constitutional:       General: She is not in acute distress.     Appearance: Normal appearance. She is not toxic-appearing.   HENT:      Head: Normocephalic and atraumatic.      Nose: Nose  normal. No congestion.      Mouth/Throat:      Pharynx: Oropharynx is clear. No oropharyngeal exudate.   Eyes:      General: No scleral icterus.  Cardiovascular:      Rate and Rhythm: Normal rate and regular rhythm.      Heart sounds: No murmur heard.     No friction rub. No gallop.   Pulmonary:      Effort: No respiratory distress.      Breath sounds: No wheezing or rales.   Abdominal:      General: There is no distension.      Tenderness: There is no abdominal tenderness. There is no guarding.   Musculoskeletal:         General: No swelling or deformity.      Cervical back: Normal range of motion. No rigidity.      Right lower leg: No edema.      Left lower leg: No edema.   Skin:     Coloration: Skin is not jaundiced.      Findings: No bruising or lesion.   Neurological:      General: No focal deficit present.      Mental Status: She is alert and oriented to person, place, and time.      Motor: No weakness.         Consultants     Consult Orders (all) (From admission, onward)       Start     Ordered    10/22/24 1224  Inpatient General Surgery Consult  Once        Specialty:  General Surgery  Provider:  Stu Naranjo MD    10/22/24 1224    10/19/24 1856  Inpatient Gastroenterology Consult  Once        Specialty:  Gastroenterology  Provider:  Viktoriya Parmar MD    10/19/24 1856    10/19/24 1829  [facility] (on-call MD unless specified) Details  Once        Specialty:  Hospitalist  Provider:  Maryam Dexter MD    10/19/24 1828                  Procedures     CHOLECYSTECTOMY LAPAROSCOPIC INTRAOPERATIVE CHOLANGIOGRAM    Imaging Results (All)       Procedure Component Value Units Date/Time    FL Cholangiogram Operative [[id number]] Collected: 10/23/24 1824     Updated: 10/23/24 1829    Narrative:      FL CHOLANGIOGRAM OPERATIVE-        INDICATION: Intraoperative cholangiogram     COMPARISON: None     TECHNIQUE: 1 static image of the right upper quadrant and one cine loop.  Fluoroscopy time: 9.2 seconds.      FINDINGS:      Cholecystectomy clips. Small catheter placed in the cystic duct remnant.  Intraoperative cholangiogram. No biliary ductal dilatation. Contrast  passes rapidly into the duodenum. No filling defects seen in the CBD.  Contrast extravasation into the right upper quadrant.       Impression:      Fluoroscopic guidance for intraoperative cholangiogram.     This report was finalized on 10/23/2024 6:26 PM by Dr. Raoul Crow M.D on Workstation: [id number]       NM HIDA SCAN WITH PHARMACOLOGICAL INTERVENTION [[id number]] Collected: 10/22/24 1022     Updated: 10/22/24 1159    Narrative:      Nuclear medicine HIDA scan with pharmacological intervention     INDICATION: Recurrent postprandial nausea and vomiting for 2 years     COMPARISON: None     TECHNIQUE: Following intravenous injection of 5.7 mCi Technetium 99m  Choletec IV, sequential static images of the abdomen were obtained at 30  minutes and 1 hour.  Subsequently, 1.2 mcg CCK was infused to assess  ejection fraction.        FINDINGS:     Radiotracer seen in the liver, gallbladder, biliary system and proximal  bowel at 30 minutes with increased radiotracer in the gallbladder and  bowel at 60 minutes. CCK administered with gallbladder ejection fraction  measuring 22%.       Impression:         1. Patent cystic duct.  2. Patent CBD.  3. Gallbladder ejection fraction measures 22% and is diminished. Finding  can be seen with chronic cholecystitis or functional gallbladder  dysfunction           This report was finalized on 10/22/2024 10:27 AM by Dr. Raoul Crow M.D on Workstation: [id number]       US Abdomen Limited [[id number]] Collected: 10/20/24 1835     Updated: 10/20/24 1857    Narrative:      US ABDOMEN LIMITED-     CLINICAL: Right upper quadrant pain.     ULTRASOUND FINDINGS: Visualized portions of the pancreas have a  satisfactory appearance, pancreas only seen in part. No ductal  dilatation seen.     There is antegrade flow demonstrated  "within the main portal vein seen on  color Doppler and spectral analysis. The gallbladder is normal in  appearance. No gallstones or pericholecystic fluid nor biliary duct  dilatation with a CBD diameter of 2 mm. Liver size and echotexture are  within normal limits. No free fluid is seen within the upper abdomen.     The right kidney measures 9.3 cm in length. No mass, calculus or  obstructive uropathy seen.     CONCLUSION: Limited evaluation of the pancreas, sonogram of the right  upper quadrant is otherwise within normal limits.     This report was finalized on 10/20/2024 6:54 PM by Dr. Yonas Raphael M.D on Workstation: [id number]                 Pertinent Labs     Results from last 7 days   Lab Units 10/23/24  0435 10/22/24  0302 10/21/24  0623 10/20/24  0405   WBC 10*3/mm3 6.13 8.13 9.97 16.49*   HEMOGLOBIN g/dL 10.6* 10.4* 11.1* 10.8*   PLATELETS 10*3/mm3 361 339 334 325     Results from last 7 days   Lab Units 10/23/24  0435 10/22/24  0302 10/21/24  0623 10/20/24  0405   SODIUM mmol/L 135* 139 134* 136   POTASSIUM mmol/L 3.5 3.8 3.6 3.8   CHLORIDE mmol/L 104 107 101 105   CO2 mmol/L 21.2* 24.9 20.8* 19.5*   BUN mg/dL 5* 5* 5* 5*   CREATININE mg/dL 0.53* 0.53* 0.61 0.60   GLUCOSE mg/dL 72 85 78 108*   EGFR mL/min/1.73 136.0 136.0 131.4 132.0     Results from last 7 days   Lab Units 10/23/24  0435 10/22/24  0302 10/21/24  0623 10/19/24  1623   ALBUMIN g/dL 3.4* 3.4* 3.5 4.0   BILIRUBIN mg/dL 0.2 0.3 0.3 0.3   ALK PHOS U/L 54 56 59 62   AST (SGOT) U/L 7 7 7 9   ALT (SGPT) U/L 5 <5 <5 5     Results from last 7 days   Lab Units 10/23/24  0435 10/22/24  1408 10/22/24  0302 10/21/24  0623 10/20/24  0405 10/19/24  1623   CALCIUM mg/dL 8.6  --  9.1 8.9 8.8 9.0   ALBUMIN g/dL 3.4*  --  3.4* 3.5  --  4.0   MAGNESIUM mg/dL  --  2.4*  --   --   --   --      Results from last 7 days   Lab Units 10/19/24  0500   LIPASE U/L 15             Invalid input(s): \"LDLCALC\"  Results from last 7 days   Lab Units 10/20/24  0835 " 10/20/24  0826   BLOODCX  No growth at 3 days No growth at 3 days     Results from last 7 days   Lab Units 10/19/24  1623   COVID19  Not Detected       Test Results Pending at Discharge     Pending Results       Procedure [Order ID] Specimen - Date/Time    CBC (No Diff) [[id number]]     Specimen: Blood     Comprehensive Metabolic Panel [[id number]]     Specimen: Blood     Tissue Pathology Exam [[id number]] Collected: 10/23/24 1811    Specimen: Tissue from Gallbladder Updated: 10/23/24 2205              Discharge Details        Discharge Medications        New Medications        Instructions Start Date   HYDROcodone-acetaminophen 5-325 MG per tablet  Commonly known as: Norco   1 tablet, Oral, Every 6 Hours PRN      pantoprazole 40 MG EC tablet  Commonly known as: PROTONIX   40 mg, Oral, 2 Times Daily Before Meals      sucralfate 1 g tablet  Commonly known as: CARAFATE   1 g, Oral, 4 Times Daily Before Meals & Nightly             Changes to Medications        Instructions Start Date   azelastine 0.1 % nasal spray  Commonly known as: ASTELIN  What changed:   when to take this  reasons to take this   2 sprays, Nasal, 2 Times Daily      norelgestromin-ethinyl estradiol 150-35 MCG/24HR  Commonly known as: ORTHO EVRA  What changed: additional instructions   1 patch, Transdermal, Weekly             Continue These Medications        Instructions Start Date   albuterol sulfate  (90 Base) MCG/ACT inhaler  Commonly known as: PROVENTIL HFA;VENTOLIN HFA;PROAIR HFA   Inhale 2 puffs 4 (Four) Times a Day.      Banophen 25 MG capsule  Generic drug: diphenhydrAMINE   Take 1 capsule by mouth 2 (Two) Times a Day.      cetirizine 5 MG tablet  Commonly known as: zyrTEC   5 mg, Every Morning      Denta 5000 Plus 1.1 % cream  Generic drug: Sodium Fluoride   Take 1 dose by mouth Every Night.      montelukast 10 MG tablet  Commonly known as: Singulair   10 mg, Oral, Nightly      ondansetron ODT 4 MG disintegrating tablet  Commonly known  as: ZOFRAN-ODT   4 mg, Oral, 4 Times Daily PRN             Stop These Medications      Scopolamine 1 MG/3DAYS patch              Allergies   Allergen Reactions    Pomegranate [[place]] Swelling       Discharge Disposition:  Home or Self Care      Discharge Diet:  Diet Order   Procedures    Diet: Regular/House; Fluid Consistency: Thin (IDDSI 0)       Discharge Activity:       CODE STATUS:    Code Status and Medical Interventions: CPR (Attempt to Resuscitate); Full Support   Ordered at: 10/19/24 2327     Code Status (Patient has no pulse and is not breathing):    CPR (Attempt to Resuscitate)     Medical Interventions (Patient has pulse or is breathing):    Full Support       Future Appointments   Date Time Provider Department Center   11/13/2024  9:00 AM Sulma Rushing PA-C [facility]   12/16/2024  1:00 PM Carmine Fink MD [facility]   1/20/2025  9:40 AM LAB CHAIR [facility]   1/20/2025 10:00 AM Yosef Hollis MD [facility]   7/28/2025  9:00 AM Gwen Aguilera, JOEM [facility]      Follow-up Information       Lala Acosta MD Follow up in 2 week(s).    Specialty: General Surgery  Why: Please call to confirm your follow up appointment.  Contact information:  [address]  [place] [address]  [phone]               Viktoriya Parmar MD. Schedule an appointment as soon as possible for a visit in 1 month(s).    Specialty: Gastroenterology  Contact information:  [address]  [place] [id number] [phone]               System, Provider Not In .    Contact information:  [facility] [address]                             Time Spent on Discharge:  32 minutes    DO [facility]  10/24/24  08:51 EDT

## 2024-10-24 NOTE — PROGRESS NOTES
Case Management Discharge Note      Final Note: Discharged home. Deepti Raya RN    Provided Post Acute Provider List?: N/A  N/A Provider List Comment: Denies needs. Plan is home    Selected Continued Care - Discharged on 10/24/2024 Admission date: 10/19/2024 - Discharge disposition: Home or Self Care         Transportation Services  Private: Car    Final Discharge Disposition Code: 01 - home or self-care

## 2024-10-24 NOTE — TELEPHONE ENCOUNTER
Patient called triage line and would like her prescription for hydrocodone-acetaminophen sent to a different pharmacy. I have updated her pharmacy to the [facility] on [address].

## 2024-10-24 NOTE — PROGRESS NOTES
Continued Stay Note  [facility]     Patient Name: Dariel Schilling  MRN: [id number]  Today's Date: 10/24/2024    Admit Date: 10/19/2024    Plan: Home with family assist   Discharge Plan       Row Name 10/24/24 0949       Plan    Plan Home with family assist    Plan Comments Discharge order noted. Met with patient who confirmed DC plan is to return home. Her mother will assist as needed and will provide transportation at DC. Denies any needs/equipment.                   Discharge Codes    No documentation.                 Expected Discharge Date and Time       Expected Discharge Date Expected Discharge Time    Oct 24, 2024               Deepti Raya RN

## 2024-10-24 NOTE — PROGRESS NOTES
Acute Care General Surgery Progress Note    Patient: Dariel Schilling  YOB: 2005  MRN: [id number]      Assessment  Dariel Schilling is a 19 y.o. female who is POD 1 laparoscopic cholecystectomy with intraoperative cholangiogram for biliary dyskinesia. Cholangiogram with no filling defects. Patient doing well this morning, incisions are in good order, and her pain is controlled. She refused am labs.  She is afebrile with stable vitals. Discussed post op restrictions, follow up, and when to return to the ED with patient, she verbalized understanding.     Plan  Okay for discharge home from a general surgery standpoint  Will need to follow up with Dr Amado in 2 weeks  No heavy lifting for 2 weeks, no submerging in water for 2 weeks, okay to shower      Subjective  Denies nausea or vomiting. Tolerating a diet.     Objective    Vitals:    10/24/24 1013   BP: 106/69   Pulse: 92   Resp: 16   Temp: 97.5 °F (36.4 °C)   SpO2: 100%           Physical Exam  Constitutional: alert, oriented, in no acute distress  Respiratory: Normal work of breathing, Symmetric excursion  Cardiovascular: Well pefused, no jugular venous distention evident   Abdominal: soft, non-distended, expected post op tenderness, incision sites are clean, dry, intact with no erythema or ecchymosis  Skin: warm, dry          TRACI Corral  Acute Care General Surgery  [facility]    [address], [address]  [place], [place], [address]  P: [phone]  F: [phone]

## 2024-10-25 DIAGNOSIS — G89.18 ACUTE POSTOPERATIVE PAIN: Primary | ICD-10-CM

## 2024-10-25 LAB
BACTERIA SPEC AEROBE CULT: NORMAL
BACTERIA SPEC AEROBE CULT: NORMAL
CYTO UR: NORMAL
LAB AP CASE REPORT: NORMAL
PATH REPORT.FINAL DX SPEC: NORMAL
PATH REPORT.GROSS SPEC: NORMAL

## 2024-10-25 RX ORDER — HYDROCODONE BITARTRATE AND ACETAMINOPHEN 5; 325 MG/1; MG/1
1 TABLET ORAL EVERY 6 HOURS PRN
Qty: 12 TABLET | Refills: 0 | Status: SHIPPED | OUTPATIENT
Start: 2024-10-25 | End: 2024-10-28

## 2024-10-25 NOTE — TELEPHONE ENCOUNTER
Patient called again asking if we could re-send the prescription. [facility] did not receive the prescription.

## 2024-10-25 NOTE — OUTREACH NOTE
Prep Survey      Flowsheet Row Responses   [facility] facility patient discharged from? [facility]   Is LACE score < 7 ? No   Eligibility Readm Mgmt   Discharge diagnosis Lap cholecystectomy   Does the patient have one of the following disease processes/diagnoses(primary or secondary)? General Surgery   Does the patient have Home health ordered? No   Is there a DME ordered? No   Prep survey completed? Yes            CHARMAINE SON - Registered Nurse

## 2024-10-25 NOTE — TELEPHONE ENCOUNTER
Patient called again regarding pain medication. Pt does not have insurance and she stated that [facility] would not let her pay out of pocket. Requesting rx be sent to [facility]. Advised that a message has been sent to Dr. Acosta. Pt is tearful and asked if someone else can approve the prescription as she has not had any pain medication since she has been home from the hospital.

## 2024-10-28 ENCOUNTER — TELEPHONE (OUTPATIENT)
Dept: SURGERY | Facility: CLINIC | Age: 19
End: 2024-10-28
Payer: MEDICAID

## 2024-10-28 ENCOUNTER — READMISSION MANAGEMENT (OUTPATIENT)
Dept: CALL CENTER | Facility: HOSPITAL | Age: 19
End: 2024-10-28
Payer: MEDICAID

## 2024-10-28 NOTE — OUTREACH NOTE
General Surgery Week 1 Survey      Flowsheet Row Responses   [facility] patient discharged from? [facility]   Does the patient have one of the following disease processes/diagnoses(primary or secondary)? General Surgery   Week 1 attempt successful? Yes   Call start time 1659   Call end time 1704   Discharge diagnosis Lap cholecystectomy   Meds reviewed with patient/caregiver? Yes   Is the patient having any side effects they believe may be caused by any medication additions or changes? No   Does the patient have all medications related to this admission filled (includes all antibiotics, pain medications, etc.) Yes   Is the patient taking all medications as directed (includes completed medication regime)? Yes   Does the patient have a follow up appointment scheduled with their surgeon? Yes   Has the patient kept scheduled appointments due by today? N/A   Psychosocial issues? No   Did the patient receive a copy of their discharge instructions? Yes   Nursing interventions Reviewed instructions with patient   What is the patient's perception of their health status since discharge? Improving   Nursing interventions Nurse provided patient education   Is the patient /caregiver able to teach back basic post-op care? Take showers only when approved by MD-sponge bathe until then, No tub bath, swimming, or hot tub until instructed by MD, Keep incision areas clean,dry and protected, Lifting as instructed by MD in discharge instructions   Is the patient/caregiver able to teach back signs and symptoms of incisional infection? Increased redness, swelling or pain at the incisonal site, Increased drainage or bleeding, Incisional warmth, Pus or odor from incision, Fever   Is the patient/caregiver able to teach back steps to recovery at home? Rest and rebuild strength, gradually increase activity, Eat a well-balance diet   If the patient is a current smoker, are they able to teach back resources for cessation? Not a smoker   Is  the patient/caregiver able to teach back the hierarchy of who to call/visit for symptoms/problems? PCP, Specialist, Home health nurse, Urgent Care, ED, 911 Yes   Week 1 call completed? Yes   Is the patient interested in additional calls from an ambulatory ? No   Would this patient benefit from a Referral to [facility] Social Work? No   Wrap up additional comments Pt states she is doing pretty good, and denies fever, or increased redness, swelling, drainage at insional site. Reviewed AVS/meds/instructions. Pt has post-op appt. Pt given phone number to obtain PCP.   Call end time 1704            Marilin SIMMONS - Registered Nurse

## 2024-10-28 NOTE — TELEPHONE ENCOUNTER
Patient is scheduled for her post-op appt on 11/7 s/p mathieu patton 10/23. She would like to return to work on 11/5. Pt states she sits most of the day and does not do any heavy lifting.

## 2024-10-28 NOTE — TELEPHONE ENCOUNTER
Patient aware ok to return on 11/5 as long as she is feeling okay. Disability paperwork completed and faxed.

## 2024-11-01 ENCOUNTER — APPOINTMENT (OUTPATIENT)
Dept: GENERAL RADIOLOGY | Facility: HOSPITAL | Age: 19
End: 2024-11-01
Payer: MEDICAID

## 2024-11-01 ENCOUNTER — HOSPITAL ENCOUNTER (EMERGENCY)
Facility: HOSPITAL | Age: 19
Discharge: HOME | End: 2024-11-01
Attending: EMERGENCY MEDICINE
Payer: MEDICAID

## 2024-11-01 VITALS
HEART RATE: 111 BPM | BODY MASS INDEX: 22.49 KG/M2 | SYSTOLIC BLOOD PRESSURE: 112 MMHG | TEMPERATURE: 98 F | WEIGHT: 135 LBS | RESPIRATION RATE: 18 BRPM | DIASTOLIC BLOOD PRESSURE: 72 MMHG | OXYGEN SATURATION: 97 % | HEIGHT: 65 IN

## 2024-11-01 DIAGNOSIS — S93.601A RIGHT FOOT SPRAIN, INITIAL ENCOUNTER: Primary | ICD-10-CM

## 2024-11-01 DIAGNOSIS — S93.401A SPRAIN OF RIGHT ANKLE, UNSPECIFIED LIGAMENT, INITIAL ENCOUNTER: ICD-10-CM

## 2024-11-01 PROCEDURE — 73610 X-RAY EXAM OF ANKLE: CPT

## 2024-11-01 PROCEDURE — 73630 X-RAY EXAM OF FOOT: CPT

## 2024-11-01 PROCEDURE — 99283 EMERGENCY DEPT VISIT LOW MDM: CPT

## 2024-11-01 RX ORDER — IBUPROFEN 800 MG/1
800 TABLET, FILM COATED ORAL EVERY 8 HOURS PRN
Qty: 30 TABLET | Refills: 0 | Status: SHIPPED | OUTPATIENT
Start: 2024-11-01

## 2024-11-01 NOTE — ED NOTES
Patient arrives via pv from home for complaints of a right foot injury. Patient states last night she tripped and fell off of her front porch. Alert and oriented x4.

## 2024-11-01 NOTE — DISCHARGE INSTRUCTIONS
Rest, ice, elevate. Wear the boot  Increase weight-bearing gradually as tolerated.  Recheck with orthopaedist of your choice or the one listed  Return to the ER as needed.

## 2024-11-01 NOTE — Clinical Note
[facility] EMERGENCY DEPARTMENT  [address] [facility] [address]  Phone: [phone]    Dariel Schilling was seen and treated in our emergency department on 11/1/2024.  She may return to work on 11/02/2024.         Thank you for choosing [facility].    Althea Perez PA-C

## 2024-11-01 NOTE — ED PROVIDER NOTES
EMERGENCY DEPARTMENT ENCOUNTER  Room Number:  05/05  PCP: System, Provider Not In  Independent Historians: Patient      HPI:  Chief Complaint: had concerns including Foot Injury.     A complete HPI/ROS/PMH/PSH/SH/FH are unobtainable due to: None    Chronic or social conditions impacting patient care (Social Determinants of Health): None      Context: The patient is a 19 y.o. female with a medical history of endometriosis, chronic abdominal pain, biliary dyskinesia, marijuana use who presents to the ED c/o acute right foot and ankle pain after stepping off of a friend's porch last night accidentally.  Unsure of the exact mechanism of injury.  Felt great pain with weightbearing subsequently.  Had a cam walking boot from an injury approximately 1 year ago, started wearing that with some increased ability to weight-bear.  Denies any other injuries or complaints at this time.      Review of prior external notes (non-ED) -and- Review of prior external test results outside of this encounter:  Labs performed 10/23/2024.  Sodium 135, creatinine 0.53, hemoglobin was 10.6        PAST MEDICAL HISTORY  Active Ambulatory Problems     Diagnosis Date Noted    Left-sided weakness 10/04/2023    Routine health maintenance 05/08/2024    Chronic abdominal pain 05/29/2024    Seasonal allergies 05/29/2024    Nausea and vomiting 05/29/2024    Chronic chest pain 05/29/2024    Endometriosis 05/29/2024    Recurrent infections 07/09/2024    Marijuana use 07/09/2024    Asthma 07/09/2024    Intractable abdominal pain 10/19/2024    SIRS (systemic inflammatory response syndrome) 10/20/2024    Biliary dyskinesia 10/19/2024     Resolved Ambulatory Problems     Diagnosis Date Noted    Chronic cough 05/29/2024     Past Medical History:   Diagnosis Date    Allergic     Anemia     Anxiety     Chlamydia     Eczema     Visual impairment          PAST SURGICAL HISTORY  Past Surgical History:   Procedure Laterality Date    CHOLECYSTECTOMY WITH  INTRAOPERATIVE CHOLANGIOGRAM N/A 10/23/2024    Procedure: CHOLECYSTECTOMY LAPAROSCOPIC INTRAOPERATIVE CHOLANGIOGRAM;  Surgeon: Lala Acosta MD;  Location: [facility] MAIN OR;  Service: General;  Laterality: N/A;    COLONOSCOPY      ENDOSCOPY N/A 10/21/2024    Procedure: ESOPHAGOGASTRODUODENOSCOPY with cold biopsies;  Surgeon: Viktoriya Parmar MD;  Location: [facility] ENDOSCOPY;  Service: Gastroenterology;  Laterality: N/A;  pre: nausea, vomitting  post: erosive gastritis    WISDOM TOOTH EXTRACTION           FAMILY HISTORY  Family History   Problem Relation Age of Onset    Asthma Mother     Hyperlipidemia Mother     Asthma Father     Arthritis Maternal Grandmother     Cancer Maternal Grandfather     Cancer Paternal Grandmother     Hyperlipidemia Paternal Grandmother     Vision loss Paternal Grandmother     Hyperlipidemia Paternal Grandfather     Cancer Maternal Great-Grandmother     Breast cancer Maternal Great-Grandmother          SOCIAL HISTORY  Social History     Socioeconomic History    Marital status: Single   Tobacco Use    Smoking status: Never    Smokeless tobacco: Never   Vaping Use    Vaping status: Never Used   Substance and Sexual Activity    Alcohol use: Never    Drug use: Never     Types: Marijuana     Comment: every other day    Sexual activity: Defer     Partners: Female     Birth control/protection: Condom, Patch         ALLERGIES  Pomegranate [punica]      REVIEW OF SYSTEMS  Review of Systems  Included in HPI  All systems reviewed and negative except for those discussed in HPI.      PHYSICAL EXAM    I have reviewed the triage vital signs and nursing notes.    ED Triage Vitals   Temp Heart Rate Resp BP SpO2   11/01/24 0729 11/01/24 0729 11/01/24 0729 11/01/24 0731 11/01/24 0729   98 °F (36.7 °C) 111 18 112/72 97 %      Temp src Heart Rate Source Patient Position BP Location FiO2 (%)   -- -- -- -- --              Physical Exam  GENERAL: alert, no acute distress  SKIN: Warm, dry  HENT: Normocephalic,  atraumatic  EYES: no scleral icterus  CV: regular rhythm, regular rate  RESPIRATORY: normal effort, lungs clear  ABDOMEN: nondistended  MUSCULOSKELETAL: no deformity.  On inspection of the right foot and ankle there is some trace right lateral ankle edema.  She is tender over the anterior ankle and diffusely over the dorsum of the midfoot.  DP PT pulses 2+, cap refill brisk and sensation intact distally.  Range of motion of the ankle causes pain.  No proximal tib-fib tenderness.  No other tenderness to the foot.  NEURO: alert, moves all extremities, follows commands            LAB RESULTS  No results found for this or any previous visit (from the past 24 hours).      RADIOLOGY  XR Foot 3+ View Right, XR Ankle 3+ View Right    Result Date: 11/1/2024  XR FOOT 3+ VW RIGHT-, XR ANKLE 3+ VW RIGHT-  INDICATION: Post fall with subsequent pain  COMPARISON: None available       Right ankle: No fracture. Ankle mortise maintained on nonweightbearing views.  Right foot: No fracture. Normal alignment. Preserved joint spaces.  This report was finalized on 11/1/2024 8:41 AM by Dr. Yehuda Pearson M.D on Workstation: [id number]         MEDICATIONS GIVEN IN ER  Medications - No data to display      ORDERS PLACED DURING THIS VISIT:  Orders Placed This Encounter   Procedures    [facility] 11.  Crutches; Heightened Risk of Morbidity / Mortality Secondary to Attempts to Perform MRADLs, Prevents Completion of MRADLs Within Reasonable Time Frame; Able to Safely Use Equipment; Mobility Deficit Can Be Sufficiently R...    XR Foot 3+ View Right    XR Ankle 3+ View Right         OUTPATIENT MEDICATION MANAGEMENT:  No current Epic-ordered facility-administered medications on file.     Current Outpatient Medications Ordered in Epic   Medication Sig Dispense Refill    albuterol sulfate  (90 Base) MCG/ACT inhaler Inhale 2 puffs 4 (Four) Times a Day.      azelastine (ASTELIN) 0.1 % nasal spray 2 sprays into the nostril(s) as  directed by provider 2 (Two) Times a Day. 30 mL 6    Banophen 25 MG capsule Take 1 capsule by mouth 2 (Two) Times a Day.      cetirizine (zyrTEC) 5 MG tablet Take 1 tablet by mouth Every Morning.      Denta 5000 Plus 1.1 % cream Take 1 dose by mouth Every Night.      ibuprofen (ADVIL,MOTRIN) 800 MG tablet Take 1 tablet by mouth Every 8 (Eight) Hours As Needed (pain. take with food.). 30 tablet 0    montelukast (Singulair) 10 MG tablet Take 1 tablet by mouth Every Night. 30 tablet 3    norelgestromin-ethinyl estradiol (ORTHO EVRA) 150-35 MCG/24HR Place 1 patch on the skin as directed by provider 1 (One) Time Per Week for 90 days. (Patient taking differently: Place 1 patch on the skin as directed by provider 1 (One) Time Per Week. Changes friday) 12 patch 3    ondansetron ODT (ZOFRAN-ODT) 4 MG disintegrating tablet Take 1 tablet by mouth 4 (Four) Times a Day As Needed for Nausea or Vomiting. 15 tablet 0    pantoprazole (PROTONIX) 40 MG EC tablet Take 1 tablet by mouth 2 (Two) Times a Day Before Meals. 60 tablet 0    sucralfate (CARAFATE) 1 g tablet Take 1 tablet by mouth 4 (Four) Times a Day Before Meals & at Bedtime. 120 tablet 0         PROCEDURES  Procedures            PROGRESS, DATA ANALYSIS, CONSULTS, AND MEDICAL DECISION MAKING  All labs have been independently interpreted by me.  All radiology studies have been reviewed by me. All EKG's have been independently viewed and interpreted by me.  Discussion below represents my analysis of pertinent findings related to patient's condition, differential diagnosis, treatment plan and final disposition.    DIFFERENTIAL    Differential diagnosis includes but is not limited to fracture, sprain, contusion, dislocation    Clinical Scores:                  ED Course as of 11/01/24 0905   Fri Nov 01, 2024   0824 My independent interpretation of the right foot and ankle x-rays is no fracture or dislocation [KA]   0901 Patient symptoms consistent with sprain.  Recommended  conservative management with rest ice elevation and NSAIDs.  Will discharge with crutches and recommended follow-up with orthopedics in 1 week. [KA]      ED Course User Index  [KA] Althea Perez PA-C             AS OF 09:05 EDT VITALS:    BP - 112/72  HR - 111  TEMP - 98 °F (36.7 °C)  O2 SATS - 97%    COMPLEXITY OF CARE  Admission was considered but after careful review of the patient's presentation, physical examination, diagnostic results, and response to treatment the patient may be safely discharged with outpatient follow-up.      DIAGNOSIS  Final diagnoses:   Right foot sprain, initial encounter   Sprain of right ankle, unspecified ligament, initial encounter         DISPOSITION  ED Disposition       ED Disposition   Discharge    Condition   Good    Comment   --                  FOLLOW UP  Memo Lindo MD  [address] [id number] [phone]    Schedule an appointment as soon as possible for a visit in 1 week          Prescribed Medications     Medication List        New Prescriptions      ibuprofen 800 MG tablet  Commonly known as: ADVIL,MOTRIN  Take 1 tablet by mouth Every 8 (Eight) Hours As Needed (pain. take with food.).            Changed      norelgestromin-ethinyl estradiol 150-35 MCG/24HR  Commonly known as: ORTHO EVRA  Place 1 patch on the skin as directed by provider 1 (One) Time Per Week for 90 days.  What changed: additional instructions               Where to Get Your Medications        These medications were sent to [facility] [id number] - [place], [place] - [address] [place] [address] [facility] [address] - [phone]  - [phone] [place] [address]      Phone: [phone]   ibuprofen 800 MG tablet                   Please note that portions of this document were completed with a voice recognition program.    Note Disclaimer: At [facility], we believe that sharing information builds trust and better relationships. You are receiving this  note because you recently visited [facility]. It is possible you will see health information before a provider has talked with you about it. This kind of information can be easy to misunderstand. To help you fully understand what it means for your health, we urge you to discuss this note with your provider.         Althea Perez PA-C  11/01/24 0905

## 2024-11-01 NOTE — ED PROVIDER NOTES
"SHARED VISIT: This visit was performed by BOTH a physician and an APC. The substantive portion of the medical decision making was performed by this attesting physician who made or approved the management plan and takes responsibility for patient management. All studies in the APC note (if performed) were independently interpreted by me.     The VINCE and I have discussed this patient's history, physical exam, and treatment plan.  I have reviewed the documentation and personally had a face to face interaction with the patient. I affirm the documentation and agree with the treatment and plan.  The attached note describes my personal findings.      I provided a substantive portion of the care of the patient.  I personally performed the physical exam in its entirety, and below are my findings.     Brief history of present illness: 19-year-old female presents with right foot and ankle injury after stepping off her porch last night.  She states that her ankle inverted and she has pain on the lateral aspect since that time.  Has pain with weightbearing.  No other injury.  No change in sensation.    Physical exam:    /72   Pulse 111   Temp 98 °F (36.7 °C)   Resp 18   Ht 165.1 cm (65\")   Wt 61.2 kg (135 lb)   LMP 10/13/2024 (Approximate)   SpO2 97%   Breastfeeding No   BMI 22.47 kg/m²       Physical Exam   Constitutional: No distress.  Nontoxic  HENT:  Head: Normocephalic and atraumatic.   Oropharynx: Mucous membranes are moist.   Eyes: . No scleral icterus.   Musculoskeletal: Right foot has mild swelling and tenderness over the lateral aspect, no obvious bony deformity, 2+ DP and PT pulse, normal sensation, no swelling or tenderness over the Achilles tendon  Neurological: Alert and oriented.  Speech fluent and easily intelligible  Skin: Skin is pink, warm, and dry.   Psychiatric: Mood and affect normal.   Nursing note and vitals reviewed.    Imaging interpreted by myself:   Right ankle x-ray shows no acute " fracture or dislocation    XR Foot 3+ View Right    Result Date: 11/1/2024   Right ankle: No fracture. Ankle mortise maintained on nonweightbearing views.  Right foot: No fracture. Normal alignment. Preserved joint spaces.  This report was finalized on 11/1/2024 8:41 AM by Dr. Yehuda Pearson M.D on Workstation: [id number]      XR Ankle 3+ View Right    Result Date: 11/1/2024   Right ankle: No fracture. Ankle mortise maintained on nonweightbearing views.  Right foot: No fracture. Normal alignment. Preserved joint spaces.  This report was finalized on 11/1/2024 8:41 AM by Dr. Yehuda Pearson M.D on Workstation: [id number]       MDM: Patient presents with right ankle injury.  Differential diagnosis includes right ankle fracture, dislocation, contusion, sprain    Please note that portions of this were completed with a voice recognition program.       Note Disclaimer: At [facility], we believe that sharing information builds trust and better relationships. You are receiving this note because you are receiving care at [facility] or recently visited. It is possible you will see health information before a provider has talked with you about it. This kind of information can be easy to misunderstand. To help you fully understand what it means for your health, we urge you to discuss this note with your provider.       Scott Ley MD  11/01/24 0904

## 2024-11-07 ENCOUNTER — OFFICE VISIT (OUTPATIENT)
Dept: SURGERY | Facility: CLINIC | Age: 19
End: 2024-11-07

## 2024-11-07 VITALS
TEMPERATURE: 97.8 F | SYSTOLIC BLOOD PRESSURE: 92 MMHG | WEIGHT: 132.2 LBS | HEART RATE: 82 BPM | DIASTOLIC BLOOD PRESSURE: 40 MMHG | HEIGHT: 65 IN | BODY MASS INDEX: 22.02 KG/M2

## 2024-11-07 DIAGNOSIS — Z90.49 STATUS POST LAPAROSCOPIC CHOLECYSTECTOMY: Primary | ICD-10-CM

## 2024-11-07 NOTE — PROGRESS NOTES
"General Surgery Office Follow Up Note     History of Present Illness:    Dariel Schilling is a 19 y.o. female who presents for follow up from laparoscopic cholecystectomy with intraoperative cholangiogram.  Pathology positive for mild chronic cholecystitis and benign gallbladder.  Patient states that she is feeling well today.  She has had a little bit of nausea and is still taking nausea medicine but denies vomiting and says that she is \"doing fine eating and drinking.\"  She denies any diarrhea or constipation.  She denies fevers, chills, chest pain, shortness of breath, or abdominal pain.  She states that her incisions are little bit itchy but she thinks this is due to the skin glue starting to peel off.  She has also had some fatigue but is feeling better overall.    She did recently fall and injure her right ankle.  She is asking if she can take ibuprofen as prescribed by the ER.      Allergies:  Allergies   Allergen Reactions    Pomegranate [Punica] Swelling       Meds:    Current Outpatient Medications:     albuterol sulfate  (90 Base) MCG/ACT inhaler, Inhale 2 puffs 4 (Four) Times a Day., Disp: , Rfl:     azelastine (ASTELIN) 0.1 % nasal spray, 2 sprays into the nostril(s) as directed by provider 2 (Two) Times a Day., Disp: 30 mL, Rfl: 6    Banophen 25 MG capsule, Take 1 capsule by mouth 2 (Two) Times a Day., Disp: , Rfl:     cetirizine (zyrTEC) 5 MG tablet, Take 1 tablet by mouth Every Morning., Disp: , Rfl:     Denta 5000 Plus 1.1 % cream, Take 1 dose by mouth Every Night., Disp: , Rfl:     montelukast (Singulair) 10 MG tablet, Take 1 tablet by mouth Every Night., Disp: 30 tablet, Rfl: 3    ondansetron ODT (ZOFRAN-ODT) 4 MG disintegrating tablet, Take 1 tablet by mouth 4 (Four) Times a Day As Needed for Nausea or Vomiting., Disp: 15 tablet, Rfl: 0    pantoprazole (PROTONIX) 40 MG EC tablet, Take 1 tablet by mouth 2 (Two) Times a Day Before Meals., Disp: 60 tablet, Rfl: 0    sucralfate (CARAFATE) 1 " "g tablet, Take 1 tablet by mouth 4 (Four) Times a Day Before Meals & at Bedtime., Disp: 120 tablet, Rfl: 0    ibuprofen (ADVIL,MOTRIN) 800 MG tablet, Take 1 tablet by mouth Every 8 (Eight) Hours As Needed (pain. take with food.). (Patient not taking: Reported on 11/7/2024), Disp: 30 tablet, Rfl: 0    Physical Exam:   BP 92/40 (BP Location: Right arm, Patient Position: Sitting, Cuff Size: Adult)   Pulse 82 Comment: manual  Temp 97.8 °F (36.6 °C)   Ht 165.1 cm (65\")   Wt 60 kg (132 lb 3.2 oz)   LMP 10/13/2024 (Approximate)   BMI 22.00 kg/m²   Constitutional: Well-developed well-nourished   Eyes: Conjunctiva normal, sclera nonicteric  HENT: Hearing grossly normal, oral mucosa moist  Respiratory: Normal inspiratory effort on room air  Cardiovascular: Well-perfused, no peripheral edema   Gastrointestinal: Abd soft, nontender, nondistended, no rebound or guarding, healing skin incisions with small amount of skin glue still in place  Musculoskeletal: Symmetric strength, normal gait  Psychiatric: Normal mood and affect  Skin:  Warm, dry, no rash on visualized skin surfaces  BMI is within normal parameters. No other follow-up for BMI required.      Assessment/Plan:  19-year-old lady presenting status post laparoscopic cholecystectomy with intraoperative cholangiogram with mild chronic cholecystitis    Patient doing well, with expected postop course.  Tolerating regular diet, having bowel function.  Incisions healing in good order.  Expect her nausea will improve over time and with treatment of her gastritis noted on EGD.  I did recommend against her taking 800 mg ibuprofen frequently due to her recent diagnosis of gastritis.  I recommended instead she can take Tylenol for her right lower extremity pain.  She is planning to schedule a follow-up with Ortho to evaluate her right ankle strain.  Discussed pathology results.  From my standpoint she may return to activity as tolerated. Avoid activities that cause pain or " strain at incision sites.   Counseled on signs and symptoms concerning for need to call the office or return to the ER.  Patient may follow-up with general surgery as needed.  Patient voiced understanding and is agreeable.    Lala Acosta M.D.  General, Robotic and Endoscopic Surgery  [facility]    [address], [address]  [place], [place], [address]  P: [phone]  F: [phone]

## 2024-11-12 ENCOUNTER — READMISSION MANAGEMENT (OUTPATIENT)
Dept: CALL CENTER | Facility: HOSPITAL | Age: 19
End: 2024-11-12

## 2024-11-12 NOTE — OUTREACH NOTE
General Surgery Week 3 Survey      Flowsheet Row Responses   [facility] patient discharged from? [facility]   Does the patient have one of the following disease processes/diagnoses(primary or secondary)? General Surgery   Week 3 attempt successful? Yes   Call start time 1524   Call end time 1525   Is patient permission given to speak with other caregiver? Yes   Person spoke with today (if not patient) and relationship Coral-mother.   Meds reviewed with patient/caregiver? Yes   Is the patient taking all medications as directed (includes completed medication regime)? Yes   Has the patient kept scheduled appointments due by today? Yes   What is the patient's perception of their health status since discharge? Improving   Week 3 call completed? Yes   Graduated Yes   Is the patient interested in additional calls from an ambulatory ? No   Would this patient benefit from a Referral to Amb Social Work? No   Wrap up additional comments Mother states patient is improving, and has returned to work. States will have patient call nurse call center with any questions/concerns.   Call end time 1525            Lenka SANTOYO - Registered Nurse

## 2025-01-09 ENCOUNTER — HOSPITAL ENCOUNTER (EMERGENCY)
Facility: HOSPITAL | Age: 20
Discharge: HOME | End: 2025-01-09
Attending: EMERGENCY MEDICINE
Payer: MEDICAID

## 2025-01-09 ENCOUNTER — APPOINTMENT (OUTPATIENT)
Dept: GENERAL RADIOLOGY | Facility: HOSPITAL | Age: 20
End: 2025-01-09
Payer: MEDICAID

## 2025-01-09 VITALS
RESPIRATION RATE: 18 BRPM | OXYGEN SATURATION: 98 % | HEART RATE: 94 BPM | WEIGHT: 130 LBS | DIASTOLIC BLOOD PRESSURE: 59 MMHG | BODY MASS INDEX: 21.66 KG/M2 | HEIGHT: 65 IN | SYSTOLIC BLOOD PRESSURE: 102 MMHG | TEMPERATURE: 96.7 F

## 2025-01-09 VITALS
HEART RATE: 120 BPM | OXYGEN SATURATION: 99 % | DIASTOLIC BLOOD PRESSURE: 88 MMHG | RESPIRATION RATE: 18 BRPM | TEMPERATURE: 98.7 F | SYSTOLIC BLOOD PRESSURE: 110 MMHG

## 2025-01-09 DIAGNOSIS — B34.9 VIRAL SYNDROME: Primary | ICD-10-CM

## 2025-01-09 DIAGNOSIS — R11.2 NAUSEA AND VOMITING, UNSPECIFIED VOMITING TYPE: ICD-10-CM

## 2025-01-09 DIAGNOSIS — J01.00 ACUTE NON-RECURRENT MAXILLARY SINUSITIS: Primary | ICD-10-CM

## 2025-01-09 LAB
ALBUMIN SERPL-MCNC: 4.2 G/DL (ref 3.5–5.2)
ALBUMIN/GLOB SERPL: 1.2 G/DL
ALP SERPL-CCNC: 65 U/L (ref 39–117)
ALT SERPL W P-5'-P-CCNC: 8 U/L (ref 1–33)
ANION GAP SERPL CALCULATED.3IONS-SCNC: 14 MMOL/L (ref 5–15)
AST SERPL-CCNC: 13 U/L (ref 1–32)
B PARAPERT DNA SPEC QL NAA+PROBE: NOT DETECTED
B PERT DNA SPEC QL NAA+PROBE: NOT DETECTED
BACTERIA UR QL AUTO: ABNORMAL /HPF
BASOPHILS # BLD AUTO: 0.03 10*3/MM3 (ref 0–0.2)
BASOPHILS NFR BLD AUTO: 0.2 % (ref 0–1.5)
BILIRUB SERPL-MCNC: 0.4 MG/DL (ref 0–1.2)
BILIRUB UR QL STRIP: NEGATIVE
BUN SERPL-MCNC: 5 MG/DL (ref 6–20)
BUN/CREAT SERPL: 7.6 (ref 7–25)
C PNEUM DNA NPH QL NAA+NON-PROBE: NOT DETECTED
CALCIUM SPEC-SCNC: 9.5 MG/DL (ref 8.6–10.5)
CHLORIDE SERPL-SCNC: 99 MMOL/L (ref 98–107)
CLARITY UR: ABNORMAL
CO2 SERPL-SCNC: 22 MMOL/L (ref 22–29)
COLOR UR: YELLOW
CREAT SERPL-MCNC: 0.66 MG/DL (ref 0.57–1)
DEPRECATED RDW RBC AUTO: 43 FL (ref 37–54)
EGFRCR SERPLBLD CKD-EPI 2021: 129 ML/MIN/1.73
EOSINOPHIL # BLD AUTO: 0.01 10*3/MM3 (ref 0–0.4)
EOSINOPHIL NFR BLD AUTO: 0.1 % (ref 0.3–6.2)
ERYTHROCYTE [DISTWIDTH] IN BLOOD BY AUTOMATED COUNT: 14.4 % (ref 12.3–15.4)
FLUAV SUBTYP SPEC NAA+PROBE: NOT DETECTED
FLUBV RNA ISLT QL NAA+PROBE: NOT DETECTED
GLOBULIN UR ELPH-MCNC: 3.5 GM/DL
GLUCOSE SERPL-MCNC: 86 MG/DL (ref 65–99)
GLUCOSE UR STRIP-MCNC: NEGATIVE MG/DL
HADV DNA SPEC NAA+PROBE: NOT DETECTED
HCG SERPL QL: NEGATIVE
HCOV 229E RNA SPEC QL NAA+PROBE: NOT DETECTED
HCOV HKU1 RNA SPEC QL NAA+PROBE: NOT DETECTED
HCOV NL63 RNA SPEC QL NAA+PROBE: NOT DETECTED
HCOV OC43 RNA SPEC QL NAA+PROBE: NOT DETECTED
HCT VFR BLD AUTO: 36.7 % (ref 34–46.6)
HGB BLD-MCNC: 11.4 G/DL (ref 12–15.9)
HGB UR QL STRIP.AUTO: NEGATIVE
HMPV RNA NPH QL NAA+NON-PROBE: NOT DETECTED
HPIV1 RNA ISLT QL NAA+PROBE: NOT DETECTED
HPIV2 RNA SPEC QL NAA+PROBE: NOT DETECTED
HPIV3 RNA NPH QL NAA+PROBE: NOT DETECTED
HPIV4 P GENE NPH QL NAA+PROBE: NOT DETECTED
HYALINE CASTS UR QL AUTO: ABNORMAL /LPF
IMM GRANULOCYTES # BLD AUTO: 0.11 10*3/MM3 (ref 0–0.05)
IMM GRANULOCYTES NFR BLD AUTO: 0.7 % (ref 0–0.5)
KETONES UR QL STRIP: ABNORMAL
LEUKOCYTE ESTERASE UR QL STRIP.AUTO: ABNORMAL
LYMPHOCYTES # BLD AUTO: 1.01 10*3/MM3 (ref 0.7–3.1)
LYMPHOCYTES NFR BLD AUTO: 6.2 % (ref 19.6–45.3)
M PNEUMO IGG SER IA-ACNC: NOT DETECTED
MCH RBC QN AUTO: 25.7 PG (ref 26.6–33)
MCHC RBC AUTO-ENTMCNC: 31.1 G/DL (ref 31.5–35.7)
MCV RBC AUTO: 82.7 FL (ref 79–97)
MONOCYTES # BLD AUTO: 0.72 10*3/MM3 (ref 0.1–0.9)
MONOCYTES NFR BLD AUTO: 4.4 % (ref 5–12)
NEUTROPHILS NFR BLD AUTO: 14.45 10*3/MM3 (ref 1.7–7)
NEUTROPHILS NFR BLD AUTO: 88.4 % (ref 42.7–76)
NITRITE UR QL STRIP: NEGATIVE
NRBC BLD AUTO-RTO: 0 /100 WBC (ref 0–0.2)
PH UR STRIP.AUTO: 8.5 [PH] (ref 5–8)
PLATELET # BLD AUTO: 371 10*3/MM3 (ref 140–450)
PMV BLD AUTO: 10 FL (ref 6–12)
POTASSIUM SERPL-SCNC: 3.3 MMOL/L (ref 3.5–5.2)
PROCALCITONIN SERPL-MCNC: 0.06 NG/ML (ref 0–0.25)
PROT SERPL-MCNC: 7.7 G/DL (ref 6–8.5)
PROT UR QL STRIP: ABNORMAL
RBC # BLD AUTO: 4.44 10*6/MM3 (ref 3.77–5.28)
RBC # UR STRIP: ABNORMAL /HPF
REF LAB TEST METHOD: ABNORMAL
RHINOVIRUS RNA SPEC NAA+PROBE: NOT DETECTED
RSV RNA NPH QL NAA+NON-PROBE: NOT DETECTED
SARS-COV-2 RNA NPH QL NAA+NON-PROBE: NOT DETECTED
SODIUM SERPL-SCNC: 135 MMOL/L (ref 136–145)
SP GR UR STRIP: 1.02 (ref 1–1.03)
SQUAMOUS #/AREA URNS HPF: ABNORMAL /HPF
UROBILINOGEN UR QL STRIP: ABNORMAL
WBC # UR STRIP: ABNORMAL /HPF
WBC NRBC COR # BLD AUTO: 16.33 10*3/MM3 (ref 3.4–10.8)

## 2025-01-09 PROCEDURE — 63710000001 ONDANSETRON ODT 4 MG TABLET DISPERSIBLE: Performed by: EMERGENCY MEDICINE

## 2025-01-09 PROCEDURE — 84703 CHORIONIC GONADOTROPIN ASSAY: CPT | Performed by: EMERGENCY MEDICINE

## 2025-01-09 PROCEDURE — 99283 EMERGENCY DEPT VISIT LOW MDM: CPT

## 2025-01-09 PROCEDURE — 96374 THER/PROPH/DIAG INJ IV PUSH: CPT

## 2025-01-09 PROCEDURE — 36415 COLL VENOUS BLD VENIPUNCTURE: CPT | Performed by: EMERGENCY MEDICINE

## 2025-01-09 PROCEDURE — 25810000003 SODIUM CHLORIDE 0.9 % SOLUTION: Performed by: PHYSICIAN ASSISTANT

## 2025-01-09 PROCEDURE — 0202U NFCT DS 22 TRGT SARS-COV-2: CPT | Performed by: EMERGENCY MEDICINE

## 2025-01-09 PROCEDURE — 84145 PROCALCITONIN (PCT): CPT | Performed by: EMERGENCY MEDICINE

## 2025-01-09 PROCEDURE — 81001 URINALYSIS AUTO W/SCOPE: CPT | Performed by: EMERGENCY MEDICINE

## 2025-01-09 PROCEDURE — 71045 X-RAY EXAM CHEST 1 VIEW: CPT

## 2025-01-09 PROCEDURE — 80053 COMPREHEN METABOLIC PANEL: CPT | Performed by: EMERGENCY MEDICINE

## 2025-01-09 PROCEDURE — 25010000002 KETOROLAC TROMETHAMINE PER 15 MG: Performed by: PHYSICIAN ASSISTANT

## 2025-01-09 PROCEDURE — 85025 COMPLETE CBC W/AUTO DIFF WBC: CPT | Performed by: EMERGENCY MEDICINE

## 2025-01-09 RX ORDER — ACETAMINOPHEN 500 MG
1000 TABLET ORAL ONCE
Status: COMPLETED | OUTPATIENT
Start: 2025-01-09 | End: 2025-01-09

## 2025-01-09 RX ORDER — ACETAMINOPHEN 500 MG
500 TABLET ORAL ONCE
Status: COMPLETED | OUTPATIENT
Start: 2025-01-09 | End: 2025-01-09

## 2025-01-09 RX ORDER — AMOXICILLIN 875 MG/1
875 TABLET, COATED ORAL 2 TIMES DAILY
Qty: 14 TABLET | Refills: 0 | Status: SHIPPED | OUTPATIENT
Start: 2025-01-09

## 2025-01-09 RX ORDER — ONDANSETRON 4 MG/1
8 TABLET, ORALLY DISINTEGRATING ORAL ONCE
Status: COMPLETED | OUTPATIENT
Start: 2025-01-09 | End: 2025-01-09

## 2025-01-09 RX ORDER — KETOROLAC TROMETHAMINE 15 MG/ML
15 INJECTION, SOLUTION INTRAMUSCULAR; INTRAVENOUS ONCE
Status: COMPLETED | OUTPATIENT
Start: 2025-01-09 | End: 2025-01-09

## 2025-01-09 RX ADMIN — ACETAMINOPHEN 500 MG: 500 TABLET, FILM COATED ORAL at 09:27

## 2025-01-09 RX ADMIN — SODIUM CHLORIDE 1000 ML: 9 INJECTION, SOLUTION INTRAVENOUS at 18:28

## 2025-01-09 RX ADMIN — KETOROLAC TROMETHAMINE 15 MG: 15 INJECTION, SOLUTION INTRAMUSCULAR; INTRAVENOUS at 18:32

## 2025-01-09 RX ADMIN — ACETAMINOPHEN 1000 MG: 500 TABLET, FILM COATED ORAL at 19:21

## 2025-01-09 RX ADMIN — ONDANSETRON 8 MG: 4 TABLET, ORALLY DISINTEGRATING ORAL at 18:32

## 2025-01-09 NOTE — DISCHARGE INSTRUCTIONS
May take Tylenol or ibuprofen every 8 hours as needed for fevers or pain.  Please return to the emergency room for any worsening pain, fevers, cough, weakness, difficulties breathing or any other concerns.

## 2025-01-09 NOTE — ED NOTES
Pt to ed from home via PV    Pt c/o facial pressure since 7pm last night. Pt also c/o HA and vomiting. Pt reports body aches. Pt states she was exposed to flu

## 2025-01-09 NOTE — ED PROVIDER NOTES
" EMERGENCY DEPARTMENT ENCOUNTER  Room Number:  32/32  PCP: System, Provider Not In  Independent Historians: Patient      HPI:  Chief Complaint: had concerns including Facial Pain.  Sinus congestion, body aches and malaise    A complete HPI/ROS/PMH/PSH/SH/FH are unobtainable due to: None    Chronic or social conditions impacting patient care (Social Determinants of Health): None      Context: Dariel Schilling is a 19 y.o. female with a medical history of asthma, anemia and anxiety who presents to the ED c/o acute headache, sinus pressure, nausea, vomiting, body aches and generalized malaise with poor appetite since 7 PM yesterday evening.  She says that her mother had similar symptoms that she may have caught \"the flu\" from her.\"  Patient denies difficulties breathing.      Review of prior external notes (non-ED) -and- Review of prior external test results outside of this encounter: I independently reviewed the hospitalist discharge summary from October 24, 2024.  She was admitted for intractable abdominal pain with biliary dyskinesia at that time.  General surgery was consulted.  Had laparoscopic cholecystectomy for chronic cholecystitis problem.    Prescription drug monitoring program review: [facility] reviewed by Guille Kaufman MD       PAST MEDICAL HISTORY  Active Ambulatory Problems     Diagnosis Date Noted    Left-sided weakness 10/04/2023    Routine health maintenance 05/08/2024    Chronic abdominal pain 05/29/2024    Seasonal allergies 05/29/2024    Nausea and vomiting 05/29/2024    Chronic chest pain 05/29/2024    Endometriosis 05/29/2024    Recurrent infections 07/09/2024    Marijuana use 07/09/2024    Asthma 07/09/2024    Intractable abdominal pain 10/19/2024    SIRS (systemic inflammatory response syndrome) 10/20/2024    Biliary dyskinesia 10/19/2024     Resolved Ambulatory Problems     Diagnosis Date Noted    Chronic cough 05/29/2024     Past Medical History:   Diagnosis Date    Allergic     Anemia     " Anxiety     Chlamydia     Eczema     Visual impairment          PAST SURGICAL HISTORY  Past Surgical History:   Procedure Laterality Date    CHOLECYSTECTOMY WITH INTRAOPERATIVE CHOLANGIOGRAM N/A 10/23/2024    Procedure: CHOLECYSTECTOMY LAPAROSCOPIC INTRAOPERATIVE CHOLANGIOGRAM;  Surgeon: Lala Acosta MD;  Location: [facility] MAIN OR;  Service: General;  Laterality: N/A;    COLONOSCOPY      ENDOSCOPY N/A 10/21/2024    Procedure: ESOPHAGOGASTRODUODENOSCOPY with cold biopsies;  Surgeon: Viktoriya Parmar MD;  Location: [facility] ENDOSCOPY;  Service: Gastroenterology;  Laterality: N/A;  pre: nausea, vomitting  post: erosive gastritis    WISDOM TOOTH EXTRACTION           FAMILY HISTORY  Family History   Problem Relation Age of Onset    Asthma Mother     Hyperlipidemia Mother     Asthma Father     Arthritis Maternal Grandmother     Cancer Maternal Grandfather     Cancer Paternal Grandmother     Hyperlipidemia Paternal Grandmother     Vision loss Paternal Grandmother     Hyperlipidemia Paternal Grandfather     Cancer Maternal Great-Grandmother     Breast cancer Maternal Great-Grandmother          SOCIAL HISTORY  Social History     Socioeconomic History    Marital status: Single   Tobacco Use    Smoking status: Never    Smokeless tobacco: Never   Vaping Use    Vaping status: Never Used   Substance and Sexual Activity    Alcohol use: Never    Drug use: Yes     Types: Marijuana     Comment: every other day    Sexual activity: Not Currently     Partners: Female     Birth control/protection: Condom, Patch         ALLERGIES  Pomegranate [punica]      REVIEW OF SYSTEMS  Review of Systems  Included in HPI  All systems reviewed and negative except for those discussed in HPI.      PHYSICAL EXAM    I have reviewed the triage vital signs and nursing notes.    ED Triage Vitals   Temp Heart Rate Resp BP SpO2   01/09/25 0912 01/09/25 0924 01/09/25 0913 01/09/25 0918 01/09/25 0924   99.8 °F (37.7 °C) 106 18 134/92 96 %      Temp src Heart  Rate Source Patient Position BP Location FiO2 (%)   01/09/25 0912 -- -- -- --   Tympanic           Physical Exam  GENERAL: alert, no acute distress  SKIN: Warm, dry, no rashes  HENT: Normocephalic, atraumatic, bilateral maxillary tenderness to palpation.  Mucous membranes somewhat dry.  No drooling or trismus.  EYES: no scleral icterus, normal conjunctivae  CV: regular rhythm, regular rate, normal pulses  RESPIRATORY: normal effort, lungs clear bilaterally, no stridor, no coughing  ABDOMEN: soft, nondistended, nontender  MUSCULOSKELETAL: no deformity, no asymmetry extremities  NEURO: alert, moves all extremities, follows commands      LAB RESULTS  Recent Results (from the past 24 hours)   Respiratory Panel PCR w/COVID-19(SARS-CoV-2) [facility]ORY/[facility]GI/[facility] In-House, NP Swab in UTM/VTM, 2 HR TAT - Swab, Nasopharynx    Collection Time: 01/09/25  9:21 AM    Specimen: Nasopharynx; Swab   Result Value Ref Range    ADENOVIRUS, PCR Not Detected Not Detected    Coronavirus 229E Not Detected Not Detected    Coronavirus HKU1 Not Detected Not Detected    Coronavirus NL63 Not Detected Not Detected    Coronavirus OC43 Not Detected Not Detected    COVID19 Not Detected Not Detected - Ref. Range    Human Metapneumovirus Not Detected Not Detected    Human Rhinovirus/Enterovirus Not Detected Not Detected    Influenza A PCR Not Detected Not Detected    Influenza B PCR Not Detected Not Detected    Parainfluenza Virus 1 Not Detected Not Detected    Parainfluenza Virus 2 Not Detected Not Detected    Parainfluenza Virus 3 Not Detected Not Detected    Parainfluenza Virus 4 Not Detected Not Detected    RSV, PCR Not Detected Not Detected    Bordetella pertussis pcr Not Detected Not Detected    Bordetella parapertussis PCR Not Detected Not Detected    Chlamydophila pneumoniae PCR Not Detected Not Detected    Mycoplasma pneumo by PCR Not Detected Not Detected         RADIOLOGY  No Radiology Exams Resulted Within Past 24 Hours      MEDICATIONS  GIVEN IN ER  Medications   acetaminophen (TYLENOL) tablet 500 mg (500 mg Oral Given 1/9/25 0927)         ORDERS PLACED DURING THIS VISIT:  Orders Placed This Encounter   Procedures    Respiratory Panel PCR w/COVID-19(SARS-CoV-2) [facility]/GLORY/[facility]GI/[facility] In-House, NP Swab in UTM/VTM, 2 HR TAT - Swab, Nasopharynx         OUTPATIENT MEDICATION MANAGEMENT:  No current Epic-ordered facility-administered medications on file.     Current Outpatient Medications Ordered in Epic   Medication Sig Dispense Refill    albuterol sulfate  (90 Base) MCG/ACT inhaler Inhale 2 puffs 4 (Four) Times a Day.      amoxicillin (AMOXIL) 875 MG tablet Take 1 tablet by mouth 2 (Two) Times a Day. 14 tablet 0    azelastine (ASTELIN) 0.1 % nasal spray 2 sprays into the nostril(s) as directed by provider 2 (Two) Times a Day. 30 mL 6    Banophen 25 MG capsule Take 1 capsule by mouth 2 (Two) Times a Day.      cetirizine (zyrTEC) 5 MG tablet Take 1 tablet by mouth Every Morning.      Denta 5000 Plus 1.1 % cream Take 1 dose by mouth Every Night.      ibuprofen (ADVIL,MOTRIN) 800 MG tablet Take 1 tablet by mouth Every 8 (Eight) Hours As Needed (pain. take with food.). (Patient not taking: Reported on 11/7/2024) 30 tablet 0    montelukast (Singulair) 10 MG tablet Take 1 tablet by mouth Every Night. 30 tablet 3    ondansetron ODT (ZOFRAN-ODT) 4 MG disintegrating tablet Take 1 tablet by mouth 4 (Four) Times a Day As Needed for Nausea or Vomiting. 15 tablet 0    pantoprazole (PROTONIX) 40 MG EC tablet Take 1 tablet by mouth 2 (Two) Times a Day Before Meals. 60 tablet 0    sucralfate (CARAFATE) 1 g tablet Take 1 tablet by mouth 4 (Four) Times a Day Before Meals & at Bedtime. 120 tablet 0         PROCEDURES  Procedures        PROGRESS, DATA ANALYSIS, CONSULTS, AND MEDICAL DECISION MAKING  All labs have been independently interpreted by me.  All radiology studies have been reviewed by me. All EKG's have been independently viewed and interpreted by me.   Discussion below represents my analysis of pertinent findings related to patient's condition, differential diagnosis, treatment plan and final disposition.    Differential diagnosis includes but is not limited to influenza, COVID-19, RSV, pneumonia, mononucleosis, pharyngitis, sinusitis.    Clinical Scores:                   ED Course as of 01/09/25 1259   Thu Jan 09, 2025   1055 Respiratory viral panel is unremarkable [NAUN]   1108 Patient is afebrile and nontoxic-appearing.  Work of breathing is normal.  Saturations are normal on room air.  She is complaining primarily of facial/sinus pressure/headache right now.  Clinically this is consistent with acute sinusitis.  I will start her on a course of amoxicillin.  However I do have a suspicion that she may also be coming down with a different virus that is not being detected on RVP.  At this point I think she is safe for discharge home and outpatient symptomatic management.  I did explain to her that she should return here in 24 to 48 hours if her condition seems to be worsening at all.  She agreed to that plan as outlined.  Also encouraged her to follow-up with local ENT provider and she told me that she had previously received that recommendation but has not yet made any appointments. [NAUN]      ED Course User Index  [NAUN] Guille Kaufman MD             AS OF 12:59 EST VITALS:    BP - 110/88  HR - 120  TEMP - 98.7 °F (37.1 °C) (Oral)  O2 SATS - 99%    COMPLEXITY OF CARE  Admission was considered but after careful review of the patient's presentation, physical examination, diagnostic results, and response to treatment the patient may be safely discharged with outpatient follow-up.      DIAGNOSIS  Final diagnoses:   Acute non-recurrent maxillary sinusitis         DISPOSITION  ED Disposition       ED Disposition   Discharge    Condition   Stable    Comment   --                Please note that portions of this document were completed with a voice recognition  program.    Note Disclaimer: At [facility], we believe that sharing information builds trust and better relationships. You are receiving this note because you recently visited [facility]. It is possible you will see health information before a provider has talked with you about it. This kind of information can be easy to misunderstand. To help you fully understand what it means for your health, we urge you to discuss this note with your provider.         Guille Kaufman MD  01/09/25 0471

## 2025-01-09 NOTE — ED PROVIDER NOTES
MD ATTESTATION NOTE  I supervised care provided by the midlevel provider. We have discussed this patient's history, physical exam, and treatment plan. I have reviewed the midlevel provider's note and I agree with the midlevel provider's findings and plan of care.   SHARED VISIT: This visit was performed by BOTH a physician and an APC. The substantive portion of the medical decision making was performed by this attesting physician who made or approved the management plan and takes responsibility for patient management. All studies in the APC note (if performed) were independently interpreted by me.   I have personally had a face to face encounter with the patient.     PCP: System, Provider Not In  Patient Care Team:  System, Provider Not In as PCP - General  Yosef Hollis MD as Consulting Physician (Hematology and Oncology)  Beba Gaytan MD as Referring Physician (Internal Medicine)     Dariel Schilling is a 19 y.o. female who presents to the ED c/o flu exposure.  Patient reports that she has been ill for the last 24 hours.  Patient reports fever with shakes chills, endorses productive cough, nausea vomiting as well as diarrhea.  Emesis nonbloody nonbilious, diarrhea has no blood or mucus.  Patient endorses global headache as well as generalized bodyaches.  Patient reports mother is positive for flu.    On exam:  General: NAD.  Head: NCAT.  ENT: nares patent, no scleral icterus  Neck: Supple, trachea midline.  No nuchal rigidity.  Cardiac: regular rate and rhythm.  Lungs: normal effort, clear to auscultation bilaterally  Abdomen: Soft, nondistended, NTTP, no rebound tenderness, no guarding or rigidity.   Extremities: Moves all extremities well, no peripheral edema  Neuro: alert, MAEW, follows commands  Psych: calm, cooperative  Skin: Warm, dry.    Medical Decision Making:  After the initial H&P, I discussed pertinent information from history and physical exam with patient/family.  Discussed differential  diagnosis.  Discussed plan for ED evaluation/work-up/treatment.  All questions answered.  Patient/family is agreeable with plan.    ED Course as of 01/09/25 2147   Thu Jan 09, 2025 1909 Glucose: 86 [KA]   1909 Creatinine: 0.66 [KA]   1909 WBC(!): 16.33 [KA]   1909 Hemoglobin(!): 11.4 [KA]   1913 I reviewed chest x-ray in PACS, no pulmonary infiltrates per my read. [JG]   2038 Procalcitonin: 0.06 [KA]   2038 HCG Qualitative: Negative [KA]   2038 Bacteria, UA(!): Trace [KA]   2038 Squamous Epithelial Cells, UA(!): 7-12 [KA]   2038 Ketones, UA(!): >=160 mg/dL (4+) [KA]      ED Course User Index  [JG] Garrison Garcia MD  [KA] Althea Perez PA-C       Diagnosis  Final diagnoses:   Viral syndrome   Nausea and vomiting, unspecified vomiting type          Garrison Garcia MD  01/09/25 2147

## 2025-01-09 NOTE — ED PROVIDER NOTES
EMERGENCY DEPARTMENT ENCOUNTER  Room Number:  05/05  PCP: System, Provider Not In  Independent Historians: Patient      HPI:  Chief Complaint: had concerns including Headache.     A complete HPI/ROS/PMH/PSH/SH/FH are unobtainable due to: None    Chronic or social conditions impacting patient care (Social Determinants of Health): None      Context: The patient is a 19 y.o. female with a medical history of chronic abdominal pain, nausea and vomiting, marijuana use, asthma who presents to the ED c/o acute generalized symptoms that started last night.She reports nausea and vomiting, fever, shakes, body aches, chills, productive cough, diarrhea.  Denies any blood in her stool or emesis.  She was exposed to her mother who has influenza.  She was evaluated in the emergency department this morning and states she is not feeling any better presents for repeat evaluation.      Review of prior external notes (non-ED) -and- Review of prior external test results outside of this encounter:  Viral respiratory panel performed earlier today was negative        PAST MEDICAL HISTORY  Active Ambulatory Problems     Diagnosis Date Noted    Left-sided weakness 10/04/2023    Routine health maintenance 05/08/2024    Chronic abdominal pain 05/29/2024    Seasonal allergies 05/29/2024    Nausea and vomiting 05/29/2024    Chronic chest pain 05/29/2024    Endometriosis 05/29/2024    Recurrent infections 07/09/2024    Marijuana use 07/09/2024    Asthma 07/09/2024    Intractable abdominal pain 10/19/2024    SIRS (systemic inflammatory response syndrome) 10/20/2024    Biliary dyskinesia 10/19/2024     Resolved Ambulatory Problems     Diagnosis Date Noted    Chronic cough 05/29/2024     Past Medical History:   Diagnosis Date    Allergic     Anemia     Anxiety     Chlamydia     Eczema     Visual impairment          PAST SURGICAL HISTORY  Past Surgical History:   Procedure Laterality Date    CHOLECYSTECTOMY WITH INTRAOPERATIVE CHOLANGIOGRAM N/A  10/23/2024    Procedure: CHOLECYSTECTOMY LAPAROSCOPIC INTRAOPERATIVE CHOLANGIOGRAM;  Surgeon: Lala Acosta MD;  Location: [facility] MAIN OR;  Service: General;  Laterality: N/A;    COLONOSCOPY      ENDOSCOPY N/A 10/21/2024    Procedure: ESOPHAGOGASTRODUODENOSCOPY with cold biopsies;  Surgeon: Viktoriya Parmar MD;  Location: [facility] ENDOSCOPY;  Service: Gastroenterology;  Laterality: N/A;  pre: nausea, vomitting  post: erosive gastritis    WISDOM TOOTH EXTRACTION           FAMILY HISTORY  Family History   Problem Relation Age of Onset    Asthma Mother     Hyperlipidemia Mother     Asthma Father     Arthritis Maternal Grandmother     Cancer Maternal Grandfather     Cancer Paternal Grandmother     Hyperlipidemia Paternal Grandmother     Vision loss Paternal Grandmother     Hyperlipidemia Paternal Grandfather     Cancer Maternal Great-Grandmother     Breast cancer Maternal Great-Grandmother          SOCIAL HISTORY  Social History     Socioeconomic History    Marital status: Single   Tobacco Use    Smoking status: Never    Smokeless tobacco: Never   Vaping Use    Vaping status: Never Used   Substance and Sexual Activity    Alcohol use: Never    Drug use: Yes     Types: Marijuana     Comment: every other day    Sexual activity: Not Currently     Partners: Female     Birth control/protection: Condom, Patch         ALLERGIES  Pomegranate [punica]      REVIEW OF SYSTEMS  Review of Systems  Included in HPI  All systems reviewed and negative except for those discussed in HPI.      PHYSICAL EXAM    I have reviewed the triage vital signs and nursing notes.    ED Triage Vitals   Temp Heart Rate Resp BP SpO2   01/09/25 1706 01/09/25 1710 01/09/25 1710 01/09/25 1710 01/09/25 1710   96.7 °F (35.9 °C) 117 18 118/72 98 %      Temp src Heart Rate Source Patient Position BP Location FiO2 (%)   01/09/25 1706 -- -- -- --   Tympanic           Physical Exam  GENERAL: alert, no acute distress  SKIN: Warm, dry  HENT: Normocephalic,  atraumatic  EYES: no scleral icterus  CV: regular rhythm, regular rate  RESPIRATORY: normal effort, lungs clear  ABDOMEN: nondistended soft, mild epigastric tenderness, no guarding or rigidity, bowel sounds present  MUSCULOSKELETAL: no deformity  NEURO: alert, moves all extremities, follows commands            LAB RESULTS  Recent Results (from the past 24 hours)   Respiratory Panel PCR w/COVID-19(SARS-CoV-2) ENRIQUE/GLORY/MARGI/PAD/COR/AMERICA In-House, NP Swab in UTM/VTM, 2 HR TAT - Swab, Nasopharynx    Collection Time: 01/09/25  9:21 AM    Specimen: Nasopharynx; Swab   Result Value Ref Range    ADENOVIRUS, PCR Not Detected Not Detected    Coronavirus 229E Not Detected Not Detected    Coronavirus HKU1 Not Detected Not Detected    Coronavirus NL63 Not Detected Not Detected    Coronavirus OC43 Not Detected Not Detected    COVID19 Not Detected Not Detected - Ref. Range    Human Metapneumovirus Not Detected Not Detected    Human Rhinovirus/Enterovirus Not Detected Not Detected    Influenza A PCR Not Detected Not Detected    Influenza B PCR Not Detected Not Detected    Parainfluenza Virus 1 Not Detected Not Detected    Parainfluenza Virus 2 Not Detected Not Detected    Parainfluenza Virus 3 Not Detected Not Detected    Parainfluenza Virus 4 Not Detected Not Detected    RSV, PCR Not Detected Not Detected    Bordetella pertussis pcr Not Detected Not Detected    Bordetella parapertussis PCR Not Detected Not Detected    Chlamydophila pneumoniae PCR Not Detected Not Detected    Mycoplasma pneumo by PCR Not Detected Not Detected         RADIOLOGY  No Radiology Exams Resulted Within Past 24 Hours      MEDICATIONS GIVEN IN ER  Medications   acetaminophen (TYLENOL) tablet 1,000 mg (has no administration in time range)   sodium chloride 0.9 % bolus 1,000 mL (1,000 mL Intravenous New Bag 1/9/25 1828)   ondansetron ODT (ZOFRAN-ODT) disintegrating tablet 8 mg (8 mg Oral Given 1/9/25 1832)   ketorolac (TORADOL) injection 15 mg (15 mg Intravenous  Given 1/9/25 1832)         ORDERS PLACED DURING THIS VISIT:  Orders Placed This Encounter   Procedures    XR Chest 1 View    Comprehensive Metabolic Panel    Urinalysis With Microscopic If Indicated (No Culture) - Urine, Clean Catch    Procalcitonin    hCG, Serum, Qualitative    CBC Auto Differential    Continuous Pulse Oximetry    CBC & Differential         OUTPATIENT MEDICATION MANAGEMENT:  Current Facility-Administered Medications Ordered in Epic   Medication Dose Route Frequency Provider Last Rate Last Admin    acetaminophen (TYLENOL) tablet 1,000 mg  1,000 mg Oral Once Garrison Garcia MD        sodium chloride 0.9 % bolus 1,000 mL  1,000 mL Intravenous Once Althea Perez PA-C 2,000 mL/hr at 01/09/25 1828 1,000 mL at 01/09/25 1828     Current Outpatient Medications Ordered in Epic   Medication Sig Dispense Refill    albuterol sulfate  (90 Base) MCG/ACT inhaler Inhale 2 puffs 4 (Four) Times a Day.      amoxicillin (AMOXIL) 875 MG tablet Take 1 tablet by mouth 2 (Two) Times a Day. 14 tablet 0    azelastine (ASTELIN) 0.1 % nasal spray 2 sprays into the nostril(s) as directed by provider 2 (Two) Times a Day. 30 mL 6    Banophen 25 MG capsule Take 1 capsule by mouth 2 (Two) Times a Day.      cetirizine (zyrTEC) 5 MG tablet Take 1 tablet by mouth Every Morning.      Denta 5000 Plus 1.1 % cream Take 1 dose by mouth Every Night.      ibuprofen (ADVIL,MOTRIN) 800 MG tablet Take 1 tablet by mouth Every 8 (Eight) Hours As Needed (pain. take with food.). (Patient not taking: Reported on 11/7/2024) 30 tablet 0    montelukast (Singulair) 10 MG tablet Take 1 tablet by mouth Every Night. 30 tablet 3    ondansetron ODT (ZOFRAN-ODT) 4 MG disintegrating tablet Take 1 tablet by mouth 4 (Four) Times a Day As Needed for Nausea or Vomiting. 15 tablet 0    pantoprazole (PROTONIX) 40 MG EC tablet Take 1 tablet by mouth 2 (Two) Times a Day Before Meals. 60 tablet 0    sucralfate (CARAFATE) 1 g tablet Take 1 tablet by mouth 4  (Four) Times a Day Before Meals & at Bedtime. 120 tablet 0         PROCEDURES  Procedures            PROGRESS, DATA ANALYSIS, CONSULTS, AND MEDICAL DECISION MAKING  All labs have been independently interpreted by me.  All radiology studies have been reviewed by me. All EKG's have been independently viewed and interpreted by me.  Discussion below represents my analysis of pertinent findings related to patient's condition, differential diagnosis, treatment plan and final disposition.    DIFFERENTIAL    Differential diagnosis includes but is not limited to:  - hepatobiliary pathology such as cholecystitis, cholangitis, and symptomatic cholelithiasis  - Pancreatitis  - Dyspepsia  - Small bowel obstruction  - Appendicitis  - Diverticulitis  - UTI including pyelonephritis  - Ureteral stone  - Zoster  - Colitis, including infectious and ischemic  - Atypical ACS      Clinical Scores:                  ED Course as of 01/11/25 1137   Thu Jan 09, 2025 1909 Glucose: 86 [KA]   1909 Creatinine: 0.66 [KA]   1909 WBC(!): 16.33 [KA]   1909 Hemoglobin(!): 11.4 [KA]   1913 I reviewed chest x-ray in PACS, no pulmonary infiltrates per my read. [JG]   2038 Procalcitonin: 0.06 [KA]   2038 HCG Qualitative: Negative [KA]   2038 Bacteria, UA(!): Trace [KA]   2038 Squamous Epithelial Cells, UA(!): 7-12 [KA]   2038 Ketones, UA(!): >=160 mg/dL (4+) [KA]      ED Course User Index  [JG] Garrison Garcia MD  [KA] Althea Perez PA-C       I reassessed the patient, she is eager for discharge.  She has had no vomiting in the emergency department.  She does have an acute leukocytosis, suspect this is related to her vomiting.  She is afebrile here, urinalysis not suggestive of infection, chest x-ray clear.  Symptoms most suggestive of a viral syndrome.  She is been given IV fluids and antiemetics in the emergency department.  She states she has antiemetics at home due to her chronic abdominal pain nausea and vomiting episodes and does not need  another prescription.  I counseled her on the nature of her diagnosis, recommendations for follow-up with PCP within a couple days and indications for return to the emergency department.  I do not see an indication for antibiotics at this time.  Work note provided.      AS OF 18:34 EST VITALS:    BP - 107/74  HR - 102  TEMP - 96.7 °F (35.9 °C) (Tympanic)  O2 SATS - 99%    COMPLEXITY OF CARE  Admission was considered but after careful review of the patient's presentation, physical examination, diagnostic results, and response to treatment the patient may be safely discharged with outpatient follow-up.      DIAGNOSIS  Final diagnoses:   Viral syndrome   Nausea and vomiting, unspecified vomiting type         DISPOSITION  ED Disposition       ED Disposition   Discharge    Condition   Good    Comment   --                  FOLLOW UP  PATIENT CONNECTION - [place] [id number]  [phone]  Schedule an appointment as soon as possible for a visit in 2 days      [facility] EMERGENCY DEPARTMENT  [address] [facility] [address] [phone]    If symptoms worsen or any concerns        Prescribed Medications     Medication List      No changes were made to your prescriptions during this visit.                   Please note that portions of this document were completed with a voice recognition program.    Note Disclaimer: At [facility], we believe that sharing information builds trust and better relationships. You are receiving this note because you recently visited [facility]. It is possible you will see health information before a provider has talked with you about it. This kind of information can be easy to misunderstand. To help you fully understand what it means for your health, we urge you to discuss this note with your provider.         Althea Perez PA-C  01/11/25 9970

## 2025-01-09 NOTE — Clinical Note
[facility] EMERGENCY DEPARTMENT  [address] [facility] [address]  Phone: [phone]    Dariel Schilling was seen and treated in our emergency department on 1/9/2025.  She may return to work on 01/11/2025.         Thank you for choosing [facility].    Althea Perez PA-C

## 2025-01-13 ENCOUNTER — TELEPHONE (OUTPATIENT)
Dept: OBSTETRICS AND GYNECOLOGY | Facility: CLINIC | Age: 20
End: 2025-01-13

## 2025-01-13 NOTE — TELEPHONE ENCOUNTER
Gwen,    Patient is requesting the nuvaring be sent to her pharmacy. She previously was on the patch but never picked it up from her pharmacy.     Pharmacy verified  [facility] [id number] - [address] AT [facility] [address] - [phone] [facility] [phone] FX       Please advise  Thanks Kateryna

## 2025-01-14 RX ORDER — NORELGESTROMIN AND ETHINYL ESTRADIOL 35; 150 UG/MG; UG/MG
1 PATCH TRANSDERMAL WEEKLY
Qty: 12 PATCH | Refills: 3 | Status: SHIPPED | OUTPATIENT
Start: 2025-01-14

## 2025-02-26 ENCOUNTER — TELEPHONE (OUTPATIENT)
Dept: OBSTETRICS AND GYNECOLOGY | Facility: CLINIC | Age: 20
End: 2025-02-26

## 2025-02-26 NOTE — TELEPHONE ENCOUNTER
"Pt sent a [employer] appt request to see Dr. Rush or Tricia Merritt for a inflammation after she got a cut on her \"labia\". Pt also wants to cut to be checked out. Does pt need to be worked in? The soonest Tricia can do is next Wednesday or Friday.      Please advise  ~Judy  "

## 2025-03-03 ENCOUNTER — OFFICE VISIT (OUTPATIENT)
Dept: OBSTETRICS AND GYNECOLOGY | Facility: CLINIC | Age: 20
End: 2025-03-03
Payer: MEDICAID

## 2025-03-03 VITALS
WEIGHT: 131.6 LBS | BODY MASS INDEX: 21.92 KG/M2 | HEIGHT: 65 IN | SYSTOLIC BLOOD PRESSURE: 104 MMHG | DIASTOLIC BLOOD PRESSURE: 67 MMHG

## 2025-03-03 DIAGNOSIS — Z30.016 ENCOUNTER FOR INITIAL PRESCRIPTION OF TRANSDERMAL PATCH HORMONAL CONTRACEPTIVE DEVICE: ICD-10-CM

## 2025-03-03 DIAGNOSIS — Z01.419 NORMAL VULVAR EXAM: Primary | ICD-10-CM

## 2025-03-03 RX ORDER — NORELGESTROMIN AND ETHINYL ESTRADIOL 35; 150 UG/MG; UG/MG
1 PATCH TRANSDERMAL WEEKLY
Qty: 3 PATCH | Refills: 11 | Status: SHIPPED | OUTPATIENT
Start: 2025-03-03 | End: 2025-04-02

## 2025-03-03 NOTE — PROGRESS NOTES
Chief Complaint   Patient presents with    Follow-up     Patient is here in regards to had a allergic reaction and now she is having inflammation. Patient states that she is having some pain from her bc         SUBJECTIVE:     Dariel Schilling is a 19 y.o.  female who presents following an allergic reaction that lead to labial swelling as well as discussion regarding birth control.   The patient reports that sometime last week, thinking it was Tuesday, she had a pomegranate drink which she is allergic to. She noted that her throat felt funny that night but she did not experience any swelling. She had protected intercourse that day as well as had oral intercourse performed on her by her boyfriend who had also consumed the pomegranate drink. Following that episode, she noted increasing facial puffiness on Wednesday as well as right labial swelling. She started taking Benadryl to help this and stopped taking it on Thursday after noting decrease in the swelling. She states that the labia did feel like there was a cut down there and it was sensitive and uncomfortable but this has since resolved. She really just wanted to get it checked to make sure everything appears normal.   The patient is using the vaginal ring for contraception and reports that she has been having abdominal cramping most days. Her left ovary also hurts sometimes.     Past Medical History:   Diagnosis Date    Allergic     Anemia     iron deficiency    Anxiety     Asthma     Chlamydia     Chronic cough 2024    Eczema     Seasonal allergies     Visual impairment       Past Surgical History:   Procedure Laterality Date    CHOLECYSTECTOMY WITH INTRAOPERATIVE CHOLANGIOGRAM N/A 10/23/2024    Procedure: CHOLECYSTECTOMY LAPAROSCOPIC INTRAOPERATIVE CHOLANGIOGRAM;  Surgeon: Lala Acosta MD;  Location: [facility];  Service: General;  Laterality: N/A;    COLONOSCOPY      ENDOSCOPY N/A 10/21/2024    Procedure: ESOPHAGOGASTRODUODENOSCOPY with  "cold biopsies;  Surgeon: Viktoriya Parmar MD;  Location: [facility] ENDOSCOPY;  Service: Gastroenterology;  Laterality: N/A;  pre: nausea, vomitting  post: erosive gastritis    WISDOM TOOTH EXTRACTION        Social History     Tobacco Use    Smoking status: Never    Smokeless tobacco: Never   Vaping Use    Vaping status: Never Used   Substance Use Topics    Alcohol use: Never    Drug use: Yes     Types: Marijuana     Comment: every other day     OB History   No obstetric history on file.        Review of Systems   Genitourinary:  Positive for pelvic pain.       OBJECTIVE:   Vitals:    03/03/25 1506   BP: 104/67   Weight: 59.7 kg (131 lb 9.6 oz)   Height: 165.1 cm (65\")        Physical Exam  Vitals reviewed. Exam conducted with a chaperone present.   Constitutional:       General: She is not in acute distress.  HENT:      Head: Normocephalic and atraumatic.      Right Ear: External ear normal.      Left Ear: External ear normal.   Eyes:      Extraocular Movements: Extraocular movements intact.      Pupils: Pupils are equal, round, and reactive to light.   Pulmonary:      Effort: Pulmonary effort is normal. No respiratory distress.   Abdominal:      General: There is no distension.      Palpations: Abdomen is soft.      Tenderness: There is no abdominal tenderness. There is no guarding or rebound.   Genitourinary:     General: Normal vulva.      Exam position: Lithotomy position.      Labia:         Right: No rash, tenderness, lesion or injury.         Left: No rash, tenderness, lesion or injury.       Urethra: No prolapse or urethral swelling.      Cervix: Normal.      Uterus: Not enlarged, not fixed and not tender.       Adnexa:         Right: No mass, tenderness or fullness.          Left: Tenderness present. No mass or fullness.     Musculoskeletal:         General: No deformity. Normal range of motion.      Cervical back: Normal range of motion and neck supple.   Lymphadenopathy:      Lower Body: No right inguinal " adenopathy. No left inguinal adenopathy.   Skin:     General: Skin is warm and dry.   Neurological:      General: No focal deficit present.      Mental Status: She is alert and oriented to person, place, and time.   Psychiatric:         Mood and Affect: Mood normal.         Behavior: Behavior normal.         ASSESSMENT:     ICD-10-CM ICD-9-CM   1. Normal vulvar exam  Z01.419 V72.31   2. Encounter for initial prescription of transdermal patch hormonal contraceptive device  Z30.016 V25.02       PLAN:   The patient had a normal vulvar exam today and shows no evidence of swelling or lesions. I agree with the patient in that I suspect that she had an allergic reaction following oral intercourse after drinking the pomegranate drink that the patient is allergic to. Reassured the patient today and advised avoidance of pomegranate products as well as her boyfriend if they are planning for oral intercourse.     We then addressed her birth control. The patient has been using vaginal rings with ongoing abdominal cramping almost every day. We discussed switching methods and I reviewed options including OCPs, POPs, implant, IUDs, patches, injections. The patient reports she struggles to take something every day and would like to use the patches. I discussed how the patches work and that they are changed weekly. She is planning on taking them continuously and this may lead to episodes of breakthrough bleeding. She is to let me know if her pain does not improve with patch. I recommend she start the patch when she is planning to switch out the vaginal ring next and use condoms for 7 days with start of the patch. Risks of using the patches discussed with the patient. Prescription for Zafemy sent to her pharmacy. Follow up as needed.     Angela Rush MD

## 2025-03-27 ENCOUNTER — HOSPITAL ENCOUNTER (EMERGENCY)
Facility: HOSPITAL | Age: 20
Discharge: HOME | End: 2025-03-27
Attending: EMERGENCY MEDICINE
Payer: MEDICAID

## 2025-03-27 VITALS
TEMPERATURE: 97.9 F | OXYGEN SATURATION: 100 % | SYSTOLIC BLOOD PRESSURE: 133 MMHG | DIASTOLIC BLOOD PRESSURE: 77 MMHG | RESPIRATION RATE: 18 BRPM | BODY MASS INDEX: 21.66 KG/M2 | WEIGHT: 130 LBS | HEIGHT: 65 IN | HEART RATE: 94 BPM

## 2025-03-27 DIAGNOSIS — R11.10 ABDOMINAL PAIN WITH VOMITING: ICD-10-CM

## 2025-03-27 DIAGNOSIS — R10.9 ABDOMINAL PAIN WITH VOMITING: ICD-10-CM

## 2025-03-27 DIAGNOSIS — J06.9 VIRAL URI: Primary | ICD-10-CM

## 2025-03-27 LAB
ALBUMIN SERPL-MCNC: 3.9 G/DL (ref 3.5–5.2)
ALBUMIN/GLOB SERPL: 1.3 G/DL
ALP SERPL-CCNC: 78 U/L (ref 39–117)
ALT SERPL W P-5'-P-CCNC: 26 U/L (ref 1–33)
AMPHET+METHAMPHET UR QL: NEGATIVE
AMPHETAMINES UR QL: NEGATIVE
ANION GAP SERPL CALCULATED.3IONS-SCNC: 15 MMOL/L (ref 5–15)
AST SERPL-CCNC: 60 U/L (ref 1–32)
B PARAPERT DNA SPEC QL NAA+PROBE: NOT DETECTED
B PERT DNA SPEC QL NAA+PROBE: NOT DETECTED
BACTERIA UR QL AUTO: ABNORMAL /HPF
BARBITURATES UR QL SCN: NEGATIVE
BASOPHILS # BLD AUTO: 0.02 10*3/MM3 (ref 0–0.2)
BASOPHILS NFR BLD AUTO: 0.3 % (ref 0–1.5)
BENZODIAZ UR QL SCN: NEGATIVE
BILIRUB SERPL-MCNC: 0.3 MG/DL (ref 0–1.2)
BILIRUB UR QL STRIP: NEGATIVE
BUN SERPL-MCNC: 5 MG/DL (ref 6–20)
BUN/CREAT SERPL: 7.8 (ref 7–25)
BUPRENORPHINE SERPL-MCNC: NEGATIVE NG/ML
C PNEUM DNA NPH QL NAA+NON-PROBE: NOT DETECTED
CALCIUM SPEC-SCNC: 8.9 MG/DL (ref 8.6–10.5)
CANNABINOIDS SERPL QL: POSITIVE
CHLORIDE SERPL-SCNC: 107 MMOL/L (ref 98–107)
CLARITY UR: CLEAR
CO2 SERPL-SCNC: 18 MMOL/L (ref 22–29)
COCAINE UR QL: NEGATIVE
COLOR UR: YELLOW
CREAT SERPL-MCNC: 0.64 MG/DL (ref 0.57–1)
DEPRECATED RDW RBC AUTO: 40.7 FL (ref 37–54)
EGFRCR SERPLBLD CKD-EPI 2021: 129.9 ML/MIN/1.73
EOSINOPHIL # BLD AUTO: 0.04 10*3/MM3 (ref 0–0.4)
EOSINOPHIL NFR BLD AUTO: 0.5 % (ref 0.3–6.2)
ERYTHROCYTE [DISTWIDTH] IN BLOOD BY AUTOMATED COUNT: 14.3 % (ref 12.3–15.4)
FENTANYL UR-MCNC: NEGATIVE NG/ML
FLUAV SUBTYP SPEC NAA+PROBE: NOT DETECTED
FLUBV RNA ISLT QL NAA+PROBE: NOT DETECTED
GLOBULIN UR ELPH-MCNC: 3 GM/DL
GLUCOSE SERPL-MCNC: 115 MG/DL (ref 65–99)
GLUCOSE UR STRIP-MCNC: NEGATIVE MG/DL
HADV DNA SPEC NAA+PROBE: NOT DETECTED
HCG SERPL QL: NEGATIVE
HCOV 229E RNA SPEC QL NAA+PROBE: NOT DETECTED
HCOV HKU1 RNA SPEC QL NAA+PROBE: NOT DETECTED
HCOV NL63 RNA SPEC QL NAA+PROBE: DETECTED
HCOV OC43 RNA SPEC QL NAA+PROBE: NOT DETECTED
HCT VFR BLD AUTO: 35.4 % (ref 34–46.6)
HGB BLD-MCNC: 11.4 G/DL (ref 12–15.9)
HGB UR QL STRIP.AUTO: ABNORMAL
HMPV RNA NPH QL NAA+NON-PROBE: NOT DETECTED
HPIV1 RNA ISLT QL NAA+PROBE: NOT DETECTED
HPIV2 RNA SPEC QL NAA+PROBE: NOT DETECTED
HPIV3 RNA NPH QL NAA+PROBE: NOT DETECTED
HPIV4 P GENE NPH QL NAA+PROBE: NOT DETECTED
HYALINE CASTS UR QL AUTO: ABNORMAL /LPF
IMM GRANULOCYTES # BLD AUTO: 0.02 10*3/MM3 (ref 0–0.05)
IMM GRANULOCYTES NFR BLD AUTO: 0.3 % (ref 0–0.5)
KETONES UR QL STRIP: ABNORMAL
LEUKOCYTE ESTERASE UR QL STRIP.AUTO: NEGATIVE
LIPASE SERPL-CCNC: 28 U/L (ref 13–60)
LYMPHOCYTES # BLD AUTO: 0.8 10*3/MM3 (ref 0.7–3.1)
LYMPHOCYTES NFR BLD AUTO: 10.6 % (ref 19.6–45.3)
M PNEUMO IGG SER IA-ACNC: NOT DETECTED
MCH RBC QN AUTO: 25.4 PG (ref 26.6–33)
MCHC RBC AUTO-ENTMCNC: 32.2 G/DL (ref 31.5–35.7)
MCV RBC AUTO: 79 FL (ref 79–97)
METHADONE UR QL SCN: NEGATIVE
MONOCYTES # BLD AUTO: 0.45 10*3/MM3 (ref 0.1–0.9)
MONOCYTES NFR BLD AUTO: 6 % (ref 5–12)
NEUTROPHILS NFR BLD AUTO: 6.22 10*3/MM3 (ref 1.7–7)
NEUTROPHILS NFR BLD AUTO: 82.3 % (ref 42.7–76)
NITRITE UR QL STRIP: NEGATIVE
NRBC BLD AUTO-RTO: 0 /100 WBC (ref 0–0.2)
OPIATES UR QL: NEGATIVE
OXYCODONE UR QL SCN: NEGATIVE
PCP UR QL SCN: NEGATIVE
PH UR STRIP.AUTO: 8.5 [PH] (ref 5–8)
PLATELET # BLD AUTO: 302 10*3/MM3 (ref 140–450)
PMV BLD AUTO: 10.1 FL (ref 6–12)
POTASSIUM SERPL-SCNC: 3.7 MMOL/L (ref 3.5–5.2)
PROT SERPL-MCNC: 6.9 G/DL (ref 6–8.5)
PROT UR QL STRIP: NEGATIVE
RBC # BLD AUTO: 4.48 10*6/MM3 (ref 3.77–5.28)
RBC # UR STRIP: ABNORMAL /HPF
REF LAB TEST METHOD: ABNORMAL
RHINOVIRUS RNA SPEC NAA+PROBE: NOT DETECTED
RSV RNA NPH QL NAA+NON-PROBE: NOT DETECTED
SARS-COV-2 RNA NPH QL NAA+NON-PROBE: NOT DETECTED
SODIUM SERPL-SCNC: 140 MMOL/L (ref 136–145)
SP GR UR STRIP: 1.02 (ref 1–1.03)
SQUAMOUS #/AREA URNS HPF: ABNORMAL /HPF
TRICYCLICS UR QL SCN: NEGATIVE
UROBILINOGEN UR QL STRIP: ABNORMAL
WBC # UR STRIP: ABNORMAL /HPF
WBC NRBC COR # BLD AUTO: 7.55 10*3/MM3 (ref 3.4–10.8)

## 2025-03-27 PROCEDURE — 25010000002 METOCLOPRAMIDE PER 10 MG: Performed by: PHYSICIAN ASSISTANT

## 2025-03-27 PROCEDURE — 96374 THER/PROPH/DIAG INJ IV PUSH: CPT

## 2025-03-27 PROCEDURE — 80307 DRUG TEST PRSMV CHEM ANLYZR: CPT | Performed by: PHYSICIAN ASSISTANT

## 2025-03-27 PROCEDURE — 36415 COLL VENOUS BLD VENIPUNCTURE: CPT

## 2025-03-27 PROCEDURE — 25810000003 LACTATED RINGERS SOLUTION: Performed by: PHYSICIAN ASSISTANT

## 2025-03-27 PROCEDURE — 85025 COMPLETE CBC W/AUTO DIFF WBC: CPT | Performed by: PHYSICIAN ASSISTANT

## 2025-03-27 PROCEDURE — 25010000002 HALOPERIDOL LACTATE PER 5 MG: Performed by: PHYSICIAN ASSISTANT

## 2025-03-27 PROCEDURE — 80053 COMPREHEN METABOLIC PANEL: CPT | Performed by: PHYSICIAN ASSISTANT

## 2025-03-27 PROCEDURE — 0202U NFCT DS 22 TRGT SARS-COV-2: CPT | Performed by: EMERGENCY MEDICINE

## 2025-03-27 PROCEDURE — 81001 URINALYSIS AUTO W/SCOPE: CPT | Performed by: PHYSICIAN ASSISTANT

## 2025-03-27 PROCEDURE — 84703 CHORIONIC GONADOTROPIN ASSAY: CPT | Performed by: PHYSICIAN ASSISTANT

## 2025-03-27 PROCEDURE — 99283 EMERGENCY DEPT VISIT LOW MDM: CPT

## 2025-03-27 PROCEDURE — 96375 TX/PRO/DX INJ NEW DRUG ADDON: CPT

## 2025-03-27 PROCEDURE — 83690 ASSAY OF LIPASE: CPT | Performed by: PHYSICIAN ASSISTANT

## 2025-03-27 RX ORDER — ONDANSETRON 4 MG/1
4 TABLET, ORALLY DISINTEGRATING ORAL 4 TIMES DAILY PRN
Qty: 20 TABLET | Refills: 0 | Status: SHIPPED | OUTPATIENT
Start: 2025-03-27

## 2025-03-27 RX ORDER — FAMOTIDINE 10 MG/ML
20 INJECTION, SOLUTION INTRAVENOUS ONCE
Status: COMPLETED | OUTPATIENT
Start: 2025-03-27 | End: 2025-03-27

## 2025-03-27 RX ORDER — SCOPOLAMINE 1 MG/3D
1 PATCH, EXTENDED RELEASE TRANSDERMAL
Qty: 10 EACH | Refills: 0 | Status: SHIPPED | OUTPATIENT
Start: 2025-03-27

## 2025-03-27 RX ORDER — METOCLOPRAMIDE HYDROCHLORIDE 5 MG/ML
10 INJECTION INTRAMUSCULAR; INTRAVENOUS ONCE
Status: COMPLETED | OUTPATIENT
Start: 2025-03-27 | End: 2025-03-27

## 2025-03-27 RX ORDER — HALOPERIDOL 5 MG/ML
2 INJECTION INTRAMUSCULAR ONCE
Status: COMPLETED | OUTPATIENT
Start: 2025-03-27 | End: 2025-03-27

## 2025-03-27 RX ORDER — DIPHENHYDRAMINE HYDROCHLORIDE 50 MG/ML
50 INJECTION, SOLUTION INTRAMUSCULAR; INTRAVENOUS ONCE
Status: DISCONTINUED | OUTPATIENT
Start: 2025-03-27 | End: 2025-03-27 | Stop reason: HOSPADM

## 2025-03-27 RX ORDER — SODIUM CHLORIDE 0.9 % (FLUSH) 0.9 %
10 SYRINGE (ML) INJECTION AS NEEDED
Status: DISCONTINUED | OUTPATIENT
Start: 2025-03-27 | End: 2025-03-27 | Stop reason: HOSPADM

## 2025-03-27 RX ADMIN — FAMOTIDINE 20 MG: 10 INJECTION INTRAVENOUS at 05:00

## 2025-03-27 RX ADMIN — HALOPERIDOL LACTATE 2 MG: 5 INJECTION, SOLUTION INTRAMUSCULAR at 05:00

## 2025-03-27 RX ADMIN — METOCLOPRAMIDE 10 MG: 5 INJECTION, SOLUTION INTRAMUSCULAR; INTRAVENOUS at 06:53

## 2025-03-27 RX ADMIN — SODIUM CHLORIDE, POTASSIUM CHLORIDE, SODIUM LACTATE AND CALCIUM CHLORIDE 1000 ML: 600; 310; 30; 20 INJECTION, SOLUTION INTRAVENOUS at 05:05

## 2025-03-27 NOTE — Clinical Note
[facility] EMERGENCY DEPARTMENT  [address] [facility] [address]  Phone: [phone]    Dariel Schilling was seen and treated in our emergency department on 3/27/2025.  She may return to work on 03/29/2025.         Thank you for choosing [facility].    Anderson Zelaya, PA

## 2025-03-27 NOTE — ED PROVIDER NOTES
I have personally performed a face-to-face diagnostic evaluation of the patient.  I have reviewed and agree with the care plan as outlined by NP/PA.  My findings are as follows:    HPI:  Patient is a 19 y.o. female who presents with complaints of nausea, vomiting, diarrhea, sore throat and some congestion.      PE:  Physical Exam  Constitutional:       Appearance: Normal appearance.   HENT:      Head: Normocephalic and atraumatic.   Eyes:      Pupils: Pupils are equal, round, and reactive to light.   Cardiovascular:      Rate and Rhythm: Normal rate and regular rhythm.      Heart sounds: No murmur heard.  Abdominal:      Tenderness: There is no abdominal tenderness. There is no guarding or rebound.   Skin:     General: Skin is warm.   Neurological:      Mental Status: She is alert and oriented to person, place, and time.           MDM:  I provided a substantiate portion of the care of this patient. I personally performed the medical decision making.    Medical records are reviewed in [facility] Everywhere, if applicable      Recent Results (from the past 24 hours)   Respiratory Panel PCR w/COVID-19(SARS-CoV-2) [facility] In-House, NP Swab in UT/VTM, 2 HR TAT - Swab, Nasopharynx    Collection Time: 03/27/25  3:37 AM    Specimen: Nasopharynx; Swab   Result Value Ref Range    ADENOVIRUS, PCR Not Detected Not Detected    Coronavirus 229E Not Detected Not Detected    Coronavirus HKU1 Not Detected Not Detected    Coronavirus NL63 Detected (A) Not Detected    Coronavirus OC43 Not Detected Not Detected    COVID19 Not Detected Not Detected - Ref. Range    Human Metapneumovirus Not Detected Not Detected    Human Rhinovirus/Enterovirus Not Detected Not Detected    Influenza A PCR Not Detected Not Detected    Influenza B PCR Not Detected Not Detected    Parainfluenza Virus 1 Not Detected Not Detected    Parainfluenza Virus 2 Not Detected Not Detected    Parainfluenza Virus 3 Not Detected Not Detected     Parainfluenza Virus 4 Not Detected Not Detected    RSV, PCR Not Detected Not Detected    Bordetella pertussis pcr Not Detected Not Detected    Bordetella parapertussis PCR Not Detected Not Detected    Chlamydophila pneumoniae PCR Not Detected Not Detected    Mycoplasma pneumo by PCR Not Detected Not Detected   Comprehensive Metabolic Panel    Collection Time: 03/27/25  5:43 AM    Specimen: Blood   Result Value Ref Range    Glucose 115 (H) 65 - 99 mg/dL    BUN 5 (L) 6 - 20 mg/dL    Creatinine 0.64 0.57 - 1.00 mg/dL    Sodium 140 136 - 145 mmol/L    Potassium 3.7 3.5 - 5.2 mmol/L    Chloride 107 98 - 107 mmol/L    CO2 18.0 (L) 22.0 - 29.0 mmol/L    Calcium 8.9 8.6 - 10.5 mg/dL    Total Protein 6.9 6.0 - 8.5 g/dL    Albumin 3.9 3.5 - 5.2 g/dL    ALT (SGPT) 26 1 - 33 U/L    AST (SGOT) 60 (H) 1 - 32 U/L    Alkaline Phosphatase 78 39 - 117 U/L    Total Bilirubin 0.3 0.0 - 1.2 mg/dL    Globulin 3.0 gm/dL    A/G Ratio 1.3 g/dL    BUN/Creatinine Ratio 7.8 7.0 - 25.0    Anion Gap 15.0 5.0 - 15.0 mmol/L    eGFR 129.9 >60.0 mL/min/1.73   Lipase    Collection Time: 03/27/25  5:43 AM    Specimen: Blood   Result Value Ref Range    Lipase 28 13 - 60 U/L   hCG, Serum, Qualitative    Collection Time: 03/27/25  5:43 AM    Specimen: Blood   Result Value Ref Range    HCG Qualitative Negative Negative   CBC Auto Differential    Collection Time: 03/27/25  5:43 AM    Specimen: Blood   Result Value Ref Range    WBC 7.55 3.40 - 10.80 10*3/mm3    RBC 4.48 3.77 - 5.28 10*6/mm3    Hemoglobin 11.4 (L) 12.0 - 15.9 g/dL    Hematocrit 35.4 34.0 - 46.6 %    MCV 79.0 79.0 - 97.0 fL    MCH 25.4 (L) 26.6 - 33.0 pg    MCHC 32.2 31.5 - 35.7 g/dL    RDW 14.3 12.3 - 15.4 %    RDW-SD 40.7 37.0 - 54.0 fl    MPV 10.1 6.0 - 12.0 fL    Platelets 302 140 - 450 10*3/mm3    Neutrophil % 82.3 (H) 42.7 - 76.0 %    Lymphocyte % 10.6 (L) 19.6 - 45.3 %    Monocyte % 6.0 5.0 - 12.0 %    Eosinophil % 0.5 0.3 - 6.2 %    Basophil % 0.3 0.0 - 1.5 %    Immature Grans % 0.3  0.0 - 0.5 %    Neutrophils, Absolute 6.22 1.70 - 7.00 10*3/mm3    Lymphocytes, Absolute 0.80 0.70 - 3.10 10*3/mm3    Monocytes, Absolute 0.45 0.10 - 0.90 10*3/mm3    Eosinophils, Absolute 0.04 0.00 - 0.40 10*3/mm3    Basophils, Absolute 0.02 0.00 - 0.20 10*3/mm3    Immature Grans, Absolute 0.02 0.00 - 0.05 10*3/mm3    nRBC 0.0 0.0 - 0.2 /100 WBC   Urinalysis With Microscopic If Indicated (No Culture) - Urine, Clean Catch    Collection Time: 03/27/25  6:03 AM    Specimen: Urine, Clean Catch   Result Value Ref Range    Color, UA Yellow Yellow, Straw    Appearance, UA Clear Clear    pH, UA 8.5 (H) 5.0 - 8.0    Specific Gravity, UA 1.025 1.005 - 1.030    Glucose, UA Negative Negative    Ketones, UA Trace (A) Negative    Bilirubin, UA Negative Negative    Blood, UA Small (1+) (A) Negative    Protein, UA Negative Negative    Leuk Esterase, UA Negative Negative    Nitrite, UA Negative Negative    Urobilinogen, UA 0.2 E.U./dL 0.2 - 1.0 E.U./dL   Urine Drug Screen - Urine, Clean Catch    Collection Time: 03/27/25  6:03 AM    Specimen: Urine, Clean Catch   Result Value Ref Range    THC, Screen, Urine Positive (A) Negative    Phencyclidine (PCP), Urine Negative Negative    Cocaine Screen, Urine Negative Negative    Methamphetamine, Ur Negative Negative    Opiate Screen Negative Negative    Amphetamine Screen, Urine Negative Negative    Benzodiazepine Screen, Urine Negative Negative    Tricyclic Antidepressants Screen Negative Negative    Methadone Screen, Urine Negative Negative    Barbiturates Screen, Urine Negative Negative    Oxycodone Screen, Urine Negative Negative    Buprenorphine, Screen, Urine Negative Negative   Fentanyl, Urine - Urine, Clean Catch    Collection Time: 03/27/25  6:03 AM    Specimen: Urine, Clean Catch   Result Value Ref Range    Fentanyl, Urine Negative Negative   Urinalysis, Microscopic Only - Urine, Clean Catch    Collection Time: 03/27/25  6:03 AM    Specimen: Urine, Clean Catch   Result Value Ref  Range    RBC, UA 3-5 (A) None Seen, 0-2 /HPF    WBC, UA 3-5 (A) None Seen, 0-2 /HPF    Bacteria, UA 1+ (A) None Seen /HPF    Squamous Epithelial Cells, UA 3-6 (A) None Seen, 0-2 /HPF    Hyaline Casts, UA None Seen None Seen /LPF    Methodology Manual Light Microscopy      I have reviewed the above labs    This is a generally healthy 19-year-old female who is presenting with some upper respiratory symptoms as well as nausea, vomiting and abdominal pain.  She overall looks clinically well.  She presents afebrile with unremarkable vital signs.  Her exam is quite benign.  I can express no significant tenderness to palpation of her abdomen.     She was evaluated with labs.  These are overall reviewed and fairly benign.  Her UA is contaminated and she is not complaining of any symptoms to suggest UTI.  I would not treat.    She is positive for a non-COVID-19 coronavirus, which certainly is contributing to her symptoms.    The setting of her benign abdominal exam, I would not pursue cross-sectional imaging.    I suspect there is some degree of cannabinoid hyperemesis syndrome here as well.  She was treated supportively with fluids, antacids, antiemetics, which did improve her symptoms.    At time of reevaluation, patient is resting comfortably, continues to have a benign abdominal exam, and is tolerating p.o. intake.  She reports feeling improved.  Therefore, she will be appropriate for discharge from the emergency department with a trial of outpatient supportive management.    Impression:  Final diagnoses:   Viral URI   Abdominal pain with vomiting         Disposition:  ED Disposition       ED Disposition   Discharge    Condition   Stable    Comment   --                Clari Rico MD  03/28/25 0035

## 2025-03-27 NOTE — ED PROVIDER NOTES
EMERGENCY DEPARTMENT ENCOUNTER  Room Number:  S02/02  PCP: System, Provider Not In  Independent Historians: Patient      HPI:  Chief Complaint: had concerns including Flu Symptoms.     A complete HPI/ROS/PMH/PSH/SH/FH are unobtainable due to: None    Chronic or social conditions impacting patient care (Social Determinants of Health): None      Context: Dariel Schilling is a 19 y.o. female with a medical history of seasonal allergies, recurrent nausea and vomiting, marijuana use, asthma who presents to the ED c/o acute vomiting, diarrhea, sore throat, nasal congestion.  Patient reports she has been feeling poorly for the last 2 to 3 days.  Reports she has been running a low-grade temperature.  Reports diffuse pain across her upper abdomen.  No known sick contacts.  Denies savanah hematemesis.  No other systemic complaints at this time.      Review of prior external notes (non-ED) -and- Review of prior external test results outside of this encounter:  Patient seen in office by OB/GYN on 3/3/2025 for vulvar exam.  Reviewed assessment and plan.  Patient prescribed birth control patch.  Reviewed labs collected on 1/9/2025.  CBC with hemoglobin 11.4, CMP with creatinine 0.66.    Prescription drug monitoring program review:     N/A    PAST MEDICAL HISTORY  Active Ambulatory Problems     Diagnosis Date Noted   • Left-sided weakness 10/04/2023   • Routine health maintenance 05/08/2024   • Chronic abdominal pain 05/29/2024   • Seasonal allergies 05/29/2024   • Nausea and vomiting 05/29/2024   • Chronic chest pain 05/29/2024   • Endometriosis 05/29/2024   • Recurrent infections 07/09/2024   • Marijuana use 07/09/2024   • Asthma 07/09/2024   • Intractable abdominal pain 10/19/2024   • SIRS (systemic inflammatory response syndrome) 10/20/2024   • Biliary dyskinesia 10/19/2024     Resolved Ambulatory Problems     Diagnosis Date Noted   • Chronic cough 05/29/2024     Past Medical History:   Diagnosis Date   • Allergic    • Anemia     • Anxiety    • Chlamydia    • Eczema    • Visual impairment          PAST SURGICAL HISTORY  Past Surgical History:   Procedure Laterality Date   • CHOLECYSTECTOMY WITH INTRAOPERATIVE CHOLANGIOGRAM N/A 10/23/2024    Procedure: CHOLECYSTECTOMY LAPAROSCOPIC INTRAOPERATIVE CHOLANGIOGRAM;  Surgeon: Lala Acosta MD;  Location: [facility] OR;  Service: General;  Laterality: N/A;   • COLONOSCOPY     • ENDOSCOPY N/A 10/21/2024    Procedure: ESOPHAGOGASTRODUODENOSCOPY with cold biopsies;  Surgeon: Viktoriya Parmar MD;  Location: [facility] ENDOSCOPY;  Service: Gastroenterology;  Laterality: N/A;  pre: nausea, vomitting  post: erosive gastritis   • WISDOM TOOTH EXTRACTION           FAMILY HISTORY  Family History   Problem Relation Age of Onset   • Asthma Mother    • Hyperlipidemia Mother    • Asthma Father    • Arthritis Maternal Grandmother    • Cancer Maternal Grandfather    • Cancer Paternal Grandmother    • Hyperlipidemia Paternal Grandmother    • Vision loss Paternal Grandmother    • Hyperlipidemia Paternal Grandfather    • Cancer Maternal Great-Grandmother    • Breast cancer Maternal Great-Grandmother          SOCIAL HISTORY  Social History     Socioeconomic History   • Marital status: Single   Tobacco Use   • Smoking status: Never   • Smokeless tobacco: Never   Vaping Use   • Vaping status: Never Used   Substance and Sexual Activity   • Alcohol use: Never   • Drug use: Yes     Types: Marijuana     Comment: every other day   • Sexual activity: Not Currently     Partners: Female     Birth control/protection: Condom, Patch         ALLERGIES  Pomegranate [punica]      REVIEW OF SYSTEMS  Included in HPI  All systems reviewed and negative except for those discussed in HPI.      PHYSICAL EXAM    I have reviewed the triage vital signs and nursing notes.    ED Triage Vitals   Temp Heart Rate Resp BP SpO2   03/27/25 0217 03/27/25 0217 03/27/25 0217 03/27/25 0219 03/27/25 0217   97.9 °F (36.6 °C) 108 18 91/69 97 %      Temp  src Heart Rate Source Patient Position BP Location FiO2 (%)   -- -- -- -- --              Physical Exam  Constitutional:       General: She is not in acute distress.     Appearance: She is well-developed.      Comments: Anxious but nontoxic appearing   HENT:      Head: Normocephalic and atraumatic.   Eyes:      Extraocular Movements: Extraocular movements intact.   Cardiovascular:      Rate and Rhythm: Normal rate and regular rhythm.      Heart sounds: Normal heart sounds.   Pulmonary:      Effort: Pulmonary effort is normal.      Breath sounds: Normal breath sounds.   Abdominal:      General: There is no distension.      Tenderness: There is generalized abdominal tenderness.      Comments: Retching on exam   Skin:     General: Skin is warm.   Neurological:      General: No focal deficit present.      Mental Status: She is alert and oriented to person, place, and time.   Psychiatric:         Mood and Affect: Mood normal.             LAB RESULTS  Recent Results (from the past 24 hours)   Respiratory Panel PCR w/COVID-19(SARS-CoV-2) [facility] In-House, NP Swab in UTM/VTM, 2 HR TAT - Swab, Nasopharynx    Collection Time: 03/27/25  3:37 AM    Specimen: Nasopharynx; Swab   Result Value Ref Range    ADENOVIRUS, PCR Not Detected Not Detected    Coronavirus 229E Not Detected Not Detected    Coronavirus HKU1 Not Detected Not Detected    Coronavirus NL63 Detected (A) Not Detected    Coronavirus OC43 Not Detected Not Detected    COVID19 Not Detected Not Detected - Ref. Range    Human Metapneumovirus Not Detected Not Detected    Human Rhinovirus/Enterovirus Not Detected Not Detected    Influenza A PCR Not Detected Not Detected    Influenza B PCR Not Detected Not Detected    Parainfluenza Virus 1 Not Detected Not Detected    Parainfluenza Virus 2 Not Detected Not Detected    Parainfluenza Virus 3 Not Detected Not Detected    Parainfluenza Virus 4 Not Detected Not Detected    RSV, PCR Not Detected Not Detected     Bordetella pertussis pcr Not Detected Not Detected    Bordetella parapertussis PCR Not Detected Not Detected    Chlamydophila pneumoniae PCR Not Detected Not Detected    Mycoplasma pneumo by PCR Not Detected Not Detected   Comprehensive Metabolic Panel    Collection Time: 03/27/25  5:43 AM    Specimen: Blood   Result Value Ref Range    Glucose 115 (H) 65 - 99 mg/dL    BUN 5 (L) 6 - 20 mg/dL    Creatinine 0.64 0.57 - 1.00 mg/dL    Sodium 140 136 - 145 mmol/L    Potassium 3.7 3.5 - 5.2 mmol/L    Chloride 107 98 - 107 mmol/L    CO2 18.0 (L) 22.0 - 29.0 mmol/L    Calcium 8.9 8.6 - 10.5 mg/dL    Total Protein 6.9 6.0 - 8.5 g/dL    Albumin 3.9 3.5 - 5.2 g/dL    ALT (SGPT) 26 1 - 33 U/L    AST (SGOT) 60 (H) 1 - 32 U/L    Alkaline Phosphatase 78 39 - 117 U/L    Total Bilirubin 0.3 0.0 - 1.2 mg/dL    Globulin 3.0 gm/dL    A/G Ratio 1.3 g/dL    BUN/Creatinine Ratio 7.8 7.0 - 25.0    Anion Gap 15.0 5.0 - 15.0 mmol/L    eGFR 129.9 >60.0 mL/min/1.73   Lipase    Collection Time: 03/27/25  5:43 AM    Specimen: Blood   Result Value Ref Range    Lipase 28 13 - 60 U/L   hCG, Serum, Qualitative    Collection Time: 03/27/25  5:43 AM    Specimen: Blood   Result Value Ref Range    HCG Qualitative Negative Negative   CBC Auto Differential    Collection Time: 03/27/25  5:43 AM    Specimen: Blood   Result Value Ref Range    WBC 7.55 3.40 - 10.80 10*3/mm3    RBC 4.48 3.77 - 5.28 10*6/mm3    Hemoglobin 11.4 (L) 12.0 - 15.9 g/dL    Hematocrit 35.4 34.0 - 46.6 %    MCV 79.0 79.0 - 97.0 fL    MCH 25.4 (L) 26.6 - 33.0 pg    MCHC 32.2 31.5 - 35.7 g/dL    RDW 14.3 12.3 - 15.4 %    RDW-SD 40.7 37.0 - 54.0 fl    MPV 10.1 6.0 - 12.0 fL    Platelets 302 140 - 450 10*3/mm3    Neutrophil % 82.3 (H) 42.7 - 76.0 %    Lymphocyte % 10.6 (L) 19.6 - 45.3 %    Monocyte % 6.0 5.0 - 12.0 %    Eosinophil % 0.5 0.3 - 6.2 %    Basophil % 0.3 0.0 - 1.5 %    Immature Grans % 0.3 0.0 - 0.5 %    Neutrophils, Absolute 6.22 1.70 - 7.00 10*3/mm3    Lymphocytes, Absolute  0.80 0.70 - 3.10 10*3/mm3    Monocytes, Absolute 0.45 0.10 - 0.90 10*3/mm3    Eosinophils, Absolute 0.04 0.00 - 0.40 10*3/mm3    Basophils, Absolute 0.02 0.00 - 0.20 10*3/mm3    Immature Grans, Absolute 0.02 0.00 - 0.05 10*3/mm3    nRBC 0.0 0.0 - 0.2 /100 WBC           MEDICATIONS GIVEN IN ER  Medications   sodium chloride 0.9 % flush 10 mL (has no administration in time range)   diphenhydrAMINE (BENADRYL) injection 50 mg (has no administration in time range)   lactated ringers bolus 1,000 mL (1,000 mL Intravenous New Bag 3/27/25 0505)   haloperidol lactate (HALDOL) injection 2 mg (2 mg Intravenous Given 3/27/25 0500)   famotidine (PEPCID) injection 20 mg (20 mg Intravenous Given 3/27/25 0500)         ORDERS PLACED DURING THIS VISIT:  Orders Placed This Encounter   Procedures   • Respiratory Panel PCR w/COVID-19(SARS-CoV-2) [facility]ORY/[facility] In-House, NP Swab in UTM/VTM, 2 HR TAT - Swab, Nasopharynx         OUTPATIENT MEDICATION MANAGEMENT:  No current Epic-ordered facility-administered medications on file.     Current Outpatient Medications Ordered in Epic   Medication Sig Dispense Refill   • albuterol sulfate  (90 Base) MCG/ACT inhaler Inhale 2 puffs 4 (Four) Times a Day.     • amoxicillin (AMOXIL) 875 MG tablet Take 1 tablet by mouth 2 (Two) Times a Day. 14 tablet 0   • azelastine (ASTELIN) 0.1 % nasal spray 2 sprays into the nostril(s) as directed by provider 2 (Two) Times a Day. 30 mL 6   • Banophen 25 MG capsule Take 1 capsule by mouth 2 (Two) Times a Day.     • cetirizine (zyrTEC) 5 MG tablet Take 1 tablet by mouth Every Morning.     • Denta 5000 Plus 1.1 % cream Take 1 dose by mouth Every Night.     • ibuprofen (ADVIL,MOTRIN) 800 MG tablet Take 1 tablet by mouth Every 8 (Eight) Hours As Needed (pain. take with food.). (Patient not taking: Reported on 3/3/2025) 30 tablet 0   • montelukast (Singulair) 10 MG tablet Take 1 tablet by mouth Every Night. 30 tablet 3   • norelgestromin-ethinyl estradiol  (Zafemy) 150-35 MCG/24HR Place 1 patch on the skin as directed by provider 1 (One) Time Per Week for 30 days. Leave patch off the fourth week for a period. 3 patch 11   • ondansetron ODT (ZOFRAN-ODT) 4 MG disintegrating tablet Take 1 tablet by mouth 4 (Four) Times a Day As Needed for Nausea or Vomiting. 15 tablet 0   • pantoprazole (PROTONIX) 40 MG EC tablet Take 1 tablet by mouth 2 (Two) Times a Day Before Meals. 60 tablet 0   • sucralfate (CARAFATE) 1 g tablet Take 1 tablet by mouth 4 (Four) Times a Day Before Meals & at Bedtime. 120 tablet 0           PROGRESS, DATA ANALYSIS, CONSULTS, AND MEDICAL DECISION MAKING  All labs have been independently interpreted by me.  All radiology studies have been reviewed by me. All EKG's have been independently viewed and interpreted by me.  Discussion below represents my analysis of pertinent findings related to patient's condition, differential diagnosis, treatment plan and final disposition.    Differential diagnosis includes but is not limited to hyperemesis, dehydration, influenza.        ED Course as of 03/27/25 2225   Thu Mar 27, 2025   0600 Pt care given to Anderson Zelaya PA-C, pending labs and disposition. [MP]   0611 Coronavirus NL63(!): Detected [MP]   0749 Updated patient on workup.  She states she is feeling better.  Vitals are stable.  We will discharge.  She asks that we refill her Zofran and scopolamine patches. [EE]      ED Course User Index  [EE] Anderson Zelaya PA  [MP] Jenny Greer PA-C             AS OF 04:27 EDT VITALS:    BP - 91/69  HR - 108  TEMP - 97.9 °F (36.6 °C)  O2 SATS - 97%        Please note that portions of this document were completed with a voice recognition program.    Note Disclaimer: At [facility], we believe that sharing information builds trust and better relationships. You are receiving this note because you recently visited [facility]. It is possible you will see health information before a provider has talked with you about  it. This kind of information can be easy to misunderstand. To help you fully understand what it means for your health, we urge you to discuss this note with your provider.     Jenny Greer PA-C  03/27/25 0620       Jenny Greer PA-C  03/27/25 6016

## 2025-05-11 ENCOUNTER — HOSPITAL ENCOUNTER (EMERGENCY)
Facility: HOSPITAL | Age: 20
Discharge: HOME | End: 2025-05-11
Attending: EMERGENCY MEDICINE | Admitting: EMERGENCY MEDICINE
Payer: MEDICAID

## 2025-05-11 VITALS
OXYGEN SATURATION: 100 % | RESPIRATION RATE: 20 BRPM | TEMPERATURE: 99.1 F | HEART RATE: 109 BPM | WEIGHT: 132 LBS | HEIGHT: 65 IN | BODY MASS INDEX: 21.99 KG/M2 | SYSTOLIC BLOOD PRESSURE: 102 MMHG | DIASTOLIC BLOOD PRESSURE: 68 MMHG

## 2025-05-11 DIAGNOSIS — F12.90 MARIJUANA USE: ICD-10-CM

## 2025-05-11 DIAGNOSIS — R11.2 NAUSEA AND VOMITING, UNSPECIFIED VOMITING TYPE: Primary | ICD-10-CM

## 2025-05-11 LAB
ALBUMIN SERPL-MCNC: 4.2 G/DL (ref 3.5–5.2)
ALBUMIN/GLOB SERPL: 1.3 G/DL
ALP SERPL-CCNC: 70 U/L (ref 39–117)
ALT SERPL W P-5'-P-CCNC: 14 U/L (ref 1–33)
AMPHET+METHAMPHET UR QL: NEGATIVE
AMPHETAMINES UR QL: NEGATIVE
ANION GAP SERPL CALCULATED.3IONS-SCNC: 15.3 MMOL/L (ref 5–15)
AST SERPL-CCNC: 28 U/L (ref 1–32)
BACTERIA UR QL AUTO: NORMAL /HPF
BARBITURATES UR QL SCN: NEGATIVE
BASOPHILS # BLD AUTO: 0.04 10*3/MM3 (ref 0–0.2)
BASOPHILS NFR BLD AUTO: 0.4 % (ref 0–1.5)
BENZODIAZ UR QL SCN: NEGATIVE
BILIRUB SERPL-MCNC: 0.3 MG/DL (ref 0–1.2)
BILIRUB UR QL STRIP: NEGATIVE
BUN SERPL-MCNC: 6 MG/DL (ref 6–20)
BUN/CREAT SERPL: 7.9 (ref 7–25)
BUPRENORPHINE SERPL-MCNC: NEGATIVE NG/ML
CALCIUM SPEC-SCNC: 9.1 MG/DL (ref 8.6–10.5)
CANNABINOIDS SERPL QL: POSITIVE
CHLORIDE SERPL-SCNC: 102 MMOL/L (ref 98–107)
CLARITY UR: CLEAR
CO2 SERPL-SCNC: 20.7 MMOL/L (ref 22–29)
COCAINE UR QL: NEGATIVE
COLOR UR: YELLOW
CREAT SERPL-MCNC: 0.76 MG/DL (ref 0.57–1)
DEPRECATED RDW RBC AUTO: 47.7 FL (ref 37–54)
EGFRCR SERPLBLD CKD-EPI 2021: 115.2 ML/MIN/1.73
EOSINOPHIL # BLD AUTO: 0.07 10*3/MM3 (ref 0–0.4)
EOSINOPHIL NFR BLD AUTO: 0.6 % (ref 0.3–6.2)
ERYTHROCYTE [DISTWIDTH] IN BLOOD BY AUTOMATED COUNT: 16.3 % (ref 12.3–15.4)
FENTANYL UR-MCNC: NEGATIVE NG/ML
GLOBULIN UR ELPH-MCNC: 3.2 GM/DL
GLUCOSE SERPL-MCNC: 110 MG/DL (ref 65–99)
GLUCOSE UR STRIP-MCNC: NEGATIVE MG/DL
HCG SERPL QL: NEGATIVE
HCT VFR BLD AUTO: 37.1 % (ref 34–46.6)
HGB BLD-MCNC: 11.9 G/DL (ref 12–15.9)
HGB UR QL STRIP.AUTO: ABNORMAL
HYALINE CASTS UR QL AUTO: NORMAL /LPF
IMM GRANULOCYTES # BLD AUTO: 0.02 10*3/MM3 (ref 0–0.05)
IMM GRANULOCYTES NFR BLD AUTO: 0.2 % (ref 0–0.5)
KETONES UR QL STRIP: NEGATIVE
LEUKOCYTE ESTERASE UR QL STRIP.AUTO: ABNORMAL
LIPASE SERPL-CCNC: 16 U/L (ref 13–60)
LYMPHOCYTES # BLD AUTO: 1.2 10*3/MM3 (ref 0.7–3.1)
LYMPHOCYTES NFR BLD AUTO: 11 % (ref 19.6–45.3)
MCH RBC QN AUTO: 25.8 PG (ref 26.6–33)
MCHC RBC AUTO-ENTMCNC: 32.1 G/DL (ref 31.5–35.7)
MCV RBC AUTO: 80.5 FL (ref 79–97)
METHADONE UR QL SCN: NEGATIVE
MONOCYTES # BLD AUTO: 0.51 10*3/MM3 (ref 0.1–0.9)
MONOCYTES NFR BLD AUTO: 4.7 % (ref 5–12)
NEUTROPHILS NFR BLD AUTO: 83.1 % (ref 42.7–76)
NEUTROPHILS NFR BLD AUTO: 9.09 10*3/MM3 (ref 1.7–7)
NITRITE UR QL STRIP: NEGATIVE
NRBC BLD AUTO-RTO: 0 /100 WBC (ref 0–0.2)
OPIATES UR QL: NEGATIVE
OXYCODONE UR QL SCN: NEGATIVE
PCP UR QL SCN: NEGATIVE
PH UR STRIP.AUTO: >=9 [PH] (ref 5–8)
PLATELET # BLD AUTO: 318 10*3/MM3 (ref 140–450)
PMV BLD AUTO: 9.9 FL (ref 6–12)
POTASSIUM SERPL-SCNC: 3.5 MMOL/L (ref 3.5–5.2)
PROT SERPL-MCNC: 7.4 G/DL (ref 6–8.5)
PROT UR QL STRIP: NEGATIVE
RBC # BLD AUTO: 4.61 10*6/MM3 (ref 3.77–5.28)
RBC # UR STRIP: NORMAL /HPF
REF LAB TEST METHOD: NORMAL
SODIUM SERPL-SCNC: 138 MMOL/L (ref 136–145)
SP GR UR STRIP: <=1.005 (ref 1–1.03)
SQUAMOUS #/AREA URNS HPF: NORMAL /HPF
TRICYCLICS UR QL SCN: NEGATIVE
UROBILINOGEN UR QL STRIP: ABNORMAL
WBC # UR STRIP: NORMAL /HPF
WBC NRBC COR # BLD AUTO: 10.93 10*3/MM3 (ref 3.4–10.8)

## 2025-05-11 PROCEDURE — 85025 COMPLETE CBC W/AUTO DIFF WBC: CPT | Performed by: PHYSICIAN ASSISTANT

## 2025-05-11 PROCEDURE — 99283 EMERGENCY DEPT VISIT LOW MDM: CPT

## 2025-05-11 PROCEDURE — 80307 DRUG TEST PRSMV CHEM ANLYZR: CPT | Performed by: PHYSICIAN ASSISTANT

## 2025-05-11 PROCEDURE — 63710000001 PROMETHAZINE PER 25 MG: Performed by: PHYSICIAN ASSISTANT

## 2025-05-11 PROCEDURE — 80053 COMPREHEN METABOLIC PANEL: CPT | Performed by: PHYSICIAN ASSISTANT

## 2025-05-11 PROCEDURE — 96374 THER/PROPH/DIAG INJ IV PUSH: CPT

## 2025-05-11 PROCEDURE — 83690 ASSAY OF LIPASE: CPT | Performed by: PHYSICIAN ASSISTANT

## 2025-05-11 PROCEDURE — 25010000002 FAMOTIDINE 10 MG/ML SOLUTION: Performed by: PHYSICIAN ASSISTANT

## 2025-05-11 PROCEDURE — 81001 URINALYSIS AUTO W/SCOPE: CPT | Performed by: PHYSICIAN ASSISTANT

## 2025-05-11 PROCEDURE — 96375 TX/PRO/DX INJ NEW DRUG ADDON: CPT

## 2025-05-11 PROCEDURE — 25010000002 METOCLOPRAMIDE PER 10 MG: Performed by: PHYSICIAN ASSISTANT

## 2025-05-11 PROCEDURE — 84703 CHORIONIC GONADOTROPIN ASSAY: CPT | Performed by: PHYSICIAN ASSISTANT

## 2025-05-11 PROCEDURE — 25810000003 LACTATED RINGERS SOLUTION: Performed by: PHYSICIAN ASSISTANT

## 2025-05-11 RX ORDER — ACETAMINOPHEN 500 MG
1000 TABLET ORAL ONCE
Status: COMPLETED | OUTPATIENT
Start: 2025-05-11 | End: 2025-05-11

## 2025-05-11 RX ORDER — METOCLOPRAMIDE HYDROCHLORIDE 5 MG/ML
10 INJECTION INTRAMUSCULAR; INTRAVENOUS ONCE
Status: COMPLETED | OUTPATIENT
Start: 2025-05-11 | End: 2025-05-11

## 2025-05-11 RX ORDER — ONDANSETRON 4 MG/1
4 TABLET, ORALLY DISINTEGRATING ORAL 4 TIMES DAILY PRN
Qty: 20 TABLET | Refills: 0 | Status: SHIPPED | OUTPATIENT
Start: 2025-05-11

## 2025-05-11 RX ORDER — FAMOTIDINE 10 MG/ML
20 INJECTION, SOLUTION INTRAVENOUS ONCE
Status: COMPLETED | OUTPATIENT
Start: 2025-05-11 | End: 2025-05-11

## 2025-05-11 RX ORDER — FAMOTIDINE 20 MG/1
20 TABLET, FILM COATED ORAL 2 TIMES DAILY
Qty: 20 TABLET | Refills: 0 | Status: SHIPPED | OUTPATIENT
Start: 2025-05-11 | End: 2025-05-14

## 2025-05-11 RX ORDER — PROMETHAZINE HYDROCHLORIDE 25 MG/1
25 TABLET ORAL ONCE
Status: COMPLETED | OUTPATIENT
Start: 2025-05-11 | End: 2025-05-11

## 2025-05-11 RX ORDER — SODIUM CHLORIDE 0.9 % (FLUSH) 0.9 %
10 SYRINGE (ML) INJECTION AS NEEDED
Status: DISCONTINUED | OUTPATIENT
Start: 2025-05-11 | End: 2025-05-11 | Stop reason: HOSPADM

## 2025-05-11 RX ADMIN — METOCLOPRAMIDE 10 MG: 5 INJECTION, SOLUTION INTRAMUSCULAR; INTRAVENOUS at 05:40

## 2025-05-11 RX ADMIN — SODIUM CHLORIDE, POTASSIUM CHLORIDE, SODIUM LACTATE AND CALCIUM CHLORIDE 1000 ML: 600; 310; 30; 20 INJECTION, SOLUTION INTRAVENOUS at 05:44

## 2025-05-11 RX ADMIN — ACETAMINOPHEN 1000 MG: 500 TABLET, FILM COATED ORAL at 06:38

## 2025-05-11 RX ADMIN — FAMOTIDINE 20 MG: 10 INJECTION INTRAVENOUS at 05:43

## 2025-05-11 RX ADMIN — PROMETHAZINE HYDROCHLORIDE 25 MG: 25 TABLET ORAL at 06:38

## 2025-05-11 NOTE — ED PROVIDER NOTES
EMERGENCY DEPARTMENT ENCOUNTER      Room Number:  04/04  PCP: System, Provider Not In  Independent Historians: Patient  Patient Care Team:  System, Provider Not In as PCP - General  Yosef Hollis MD as Consulting Physician (Hematology and Oncology)  Beba Gaytan MD as Referring Physician (Internal Medicine)       HPI:  Chief Complaint: N/V    A complete HPI/ROS/PMH/PSH/SH/FH are unobtainable due to: None    Chronic or social conditions impacting patient care (Social Determinants of Health): None      Context: Dariel Schilling is a 19 y.o. female with a PMH significant for anxiety, anemia who presents to the ED c/o acute nausea and vomiting with a mild headache that developed overnight last night.  The patient denies fever, chills, changes to bowel habits or urination.  No sick contacts at home.  No obvious palliative activity identified.  She is a daily marijuana user.  Reports that she has had symptoms similar to this in the past.  No concern for pregnancy.      Upon review of prior external notes (non-ED) -and- Review of prior external test results outside of this encounter it appears the patient was evaluated in the office with OB/GYN for routine appointment on 3/3/2025.  The patient had a normal hCG and lipase on 3/27/2025.      PAST MEDICAL HISTORY  Active Ambulatory Problems     Diagnosis Date Noted    Left-sided weakness 10/04/2023    Routine health maintenance 05/08/2024    Chronic abdominal pain 05/29/2024    Seasonal allergies 05/29/2024    Nausea and vomiting 05/29/2024    Chronic chest pain 05/29/2024    Endometriosis 05/29/2024    Recurrent infections 07/09/2024    Marijuana use 07/09/2024    Asthma 07/09/2024    Intractable abdominal pain 10/19/2024    SIRS (systemic inflammatory response syndrome) 10/20/2024    Biliary dyskinesia 10/19/2024     Resolved Ambulatory Problems     Diagnosis Date Noted    Chronic cough 05/29/2024     Past Medical History:   Diagnosis Date    Allergic      Anemia     Anxiety     Chlamydia     Eczema     Visual impairment          PAST SURGICAL HISTORY  Past Surgical History:   Procedure Laterality Date    CHOLECYSTECTOMY WITH INTRAOPERATIVE CHOLANGIOGRAM N/A 10/23/2024    Procedure: CHOLECYSTECTOMY LAPAROSCOPIC INTRAOPERATIVE CHOLANGIOGRAM;  Surgeon: Lala Acosta MD;  Location: [facility] MAIN OR;  Service: General;  Laterality: N/A;    COLONOSCOPY      ENDOSCOPY N/A 10/21/2024    Procedure: ESOPHAGOGASTRODUODENOSCOPY with cold biopsies;  Surgeon: Viktoriya Parmar MD;  Location: [facility] ENDOSCOPY;  Service: Gastroenterology;  Laterality: N/A;  pre: nausea, vomitting  post: erosive gastritis    WISDOM TOOTH EXTRACTION           FAMILY HISTORY  Family History   Problem Relation Age of Onset    Asthma Mother     Hyperlipidemia Mother     Asthma Father     Arthritis Maternal Grandmother     Cancer Maternal Grandfather     Cancer Paternal Grandmother     Hyperlipidemia Paternal Grandmother     Vision loss Paternal Grandmother     Hyperlipidemia Paternal Grandfather     Cancer Maternal Great-Grandmother     Breast cancer Maternal Great-Grandmother          SOCIAL HISTORY  Social History     Socioeconomic History    Marital status: Single   Tobacco Use    Smoking status: Never    Smokeless tobacco: Never   Vaping Use    Vaping status: Never Used   Substance and Sexual Activity    Alcohol use: Never    Drug use: Yes     Types: Marijuana     Comment: every other day    Sexual activity: Not Currently     Partners: Female     Birth control/protection: Condom, Patch         ALLERGIES  Pomegranate [punica]      REVIEW OF SYSTEMS  Included in HPI  All systems reviewed and negative except for those discussed in HPI.      PHYSICAL EXAM    I have reviewed the triage vital signs and nursing notes.    ED Triage Vitals   Temp Heart Rate Resp BP SpO2   05/11/25 0457 05/11/25 0456 05/11/25 0456 05/11/25 0459 05/11/25 0456   99.1 °F (37.3 °C) 109 20 108/72 99 %      Temp src Heart Rate  Source Patient Position BP Location FiO2 (%)   -- -- 05/11/25 0459 05/11/25 0459 --     Sitting Right arm        Physical Exam  Constitutional:       General: She is not in acute distress.     Appearance: She is well-developed.   HENT:      Head: Normocephalic and atraumatic.   Eyes:      General: No scleral icterus.     Conjunctiva/sclera: Conjunctivae normal.   Neck:      Trachea: No tracheal deviation.   Cardiovascular:      Rate and Rhythm: Regular rhythm.      Comments: Tachycardia  Pulmonary:      Effort: Pulmonary effort is normal.      Breath sounds: Normal breath sounds.   Abdominal:      Palpations: Abdomen is soft.      Tenderness: There is no abdominal tenderness.   Musculoskeletal:         General: No deformity.      Cervical back: Normal range of motion.   Lymphadenopathy:      Cervical: No cervical adenopathy.   Skin:     General: Skin is warm and dry.   Neurological:      Mental Status: She is alert and oriented to person, place, and time.   Psychiatric:         Behavior: Behavior normal.         Vital signs and nursing notes reviewed.      PPE: I wore a surgical mask throughout my encounters with the pt. I performed hand hygiene on entry into the pt room and upon exit.     LAB RESULTS  Recent Results (from the past 24 hours)   Comprehensive Metabolic Panel    Collection Time: 05/11/25  5:45 AM    Specimen: Blood   Result Value Ref Range    Glucose 110 (H) 65 - 99 mg/dL    BUN 6 6 - 20 mg/dL    Creatinine 0.76 0.57 - 1.00 mg/dL    Sodium 138 136 - 145 mmol/L    Potassium 3.5 3.5 - 5.2 mmol/L    Chloride 102 98 - 107 mmol/L    CO2 20.7 (L) 22.0 - 29.0 mmol/L    Calcium 9.1 8.6 - 10.5 mg/dL    Total Protein 7.4 6.0 - 8.5 g/dL    Albumin 4.2 3.5 - 5.2 g/dL    ALT (SGPT) 14 1 - 33 U/L    AST (SGOT) 28 1 - 32 U/L    Alkaline Phosphatase 70 39 - 117 U/L    Total Bilirubin 0.3 0.0 - 1.2 mg/dL    Globulin 3.2 gm/dL    A/G Ratio 1.3 g/dL    BUN/Creatinine Ratio 7.9 7.0 - 25.0    Anion Gap 15.3 (H) 5.0 - 15.0  mmol/L    eGFR 115.2 >60.0 mL/min/1.73   Lipase    Collection Time: 05/11/25  5:45 AM    Specimen: Blood   Result Value Ref Range    Lipase 16 13 - 60 U/L   hCG, Serum, Qualitative    Collection Time: 05/11/25  5:45 AM    Specimen: Blood   Result Value Ref Range    HCG Qualitative Negative Negative   Urinalysis With Microscopic If Indicated (No Culture) - Urine, Clean Catch    Collection Time: 05/11/25  5:45 AM    Specimen: Urine, Clean Catch   Result Value Ref Range    Color, UA Yellow Yellow, Straw    Appearance, UA Clear Clear    pH, UA >=9.0 (H) 5.0 - 8.0    Specific Gravity, UA <=1.005 1.005 - 1.030    Glucose, UA Negative Negative    Ketones, UA Negative Negative    Bilirubin, UA Negative Negative    Blood, UA Moderate (2+) (A) Negative    Protein, UA Negative Negative    Leuk Esterase, UA Trace (A) Negative    Nitrite, UA Negative Negative    Urobilinogen, UA 0.2 E.U./dL 0.2 - 1.0 E.U./dL   Urine Drug Screen - Urine, Clean Catch    Collection Time: 05/11/25  5:45 AM    Specimen: Urine, Clean Catch   Result Value Ref Range    THC, Screen, Urine Positive (A) Negative    Phencyclidine (PCP), Urine Negative Negative    Cocaine Screen, Urine Negative Negative    Methamphetamine, Ur Negative Negative    Opiate Screen Negative Negative    Amphetamine Screen, Urine Negative Negative    Benzodiazepine Screen, Urine Negative Negative    Tricyclic Antidepressants Screen Negative Negative    Methadone Screen, Urine Negative Negative    Barbiturates Screen, Urine Negative Negative    Oxycodone Screen, Urine Negative Negative    Buprenorphine, Screen, Urine Negative Negative   CBC Auto Differential    Collection Time: 05/11/25  5:45 AM    Specimen: Blood   Result Value Ref Range    WBC 10.93 (H) 3.40 - 10.80 10*3/mm3    RBC 4.61 3.77 - 5.28 10*6/mm3    Hemoglobin 11.9 (L) 12.0 - 15.9 g/dL    Hematocrit 37.1 34.0 - 46.6 %    MCV 80.5 79.0 - 97.0 fL    MCH 25.8 (L) 26.6 - 33.0 pg    MCHC 32.1 31.5 - 35.7 g/dL    RDW 16.3 (H)  12.3 - 15.4 %    RDW-SD 47.7 37.0 - 54.0 fl    MPV 9.9 6.0 - 12.0 fL    Platelets 318 140 - 450 10*3/mm3    Neutrophil % 83.1 (H) 42.7 - 76.0 %    Lymphocyte % 11.0 (L) 19.6 - 45.3 %    Monocyte % 4.7 (L) 5.0 - 12.0 %    Eosinophil % 0.6 0.3 - 6.2 %    Basophil % 0.4 0.0 - 1.5 %    Immature Grans % 0.2 0.0 - 0.5 %    Neutrophils, Absolute 9.09 (H) 1.70 - 7.00 10*3/mm3    Lymphocytes, Absolute 1.20 0.70 - 3.10 10*3/mm3    Monocytes, Absolute 0.51 0.10 - 0.90 10*3/mm3    Eosinophils, Absolute 0.07 0.00 - 0.40 10*3/mm3    Basophils, Absolute 0.04 0.00 - 0.20 10*3/mm3    Immature Grans, Absolute 0.02 0.00 - 0.05 10*3/mm3    nRBC 0.0 0.0 - 0.2 /100 WBC   Urinalysis, Microscopic Only - Urine, Clean Catch    Collection Time: 05/11/25  5:45 AM    Specimen: Urine, Clean Catch   Result Value Ref Range    RBC, UA 0-2 None Seen, 0-2 /HPF    WBC, UA 0-2 None Seen, 0-2 /HPF    Bacteria, UA None Seen None Seen /HPF    Squamous Epithelial Cells, UA 0-2 None Seen, 0-2 /HPF    Hyaline Casts, UA None Seen None Seen /LPF    Methodology Automated Microscopy          RADIOLOGY  No Radiology Exams Resulted Within Past 24 Hours      MEDICATIONS GIVEN IN ER  Medications   sodium chloride 0.9 % flush 10 mL (has no administration in time range)   lactated ringers bolus 1,000 mL (1,000 mL Intravenous New Bag 5/11/25 3544)   metoclopramide (REGLAN) injection 10 mg (10 mg Intravenous Given 5/11/25 4040)   famotidine (PEPCID) injection 20 mg (20 mg Intravenous Given 5/11/25 8243)   acetaminophen (TYLENOL) tablet 1,000 mg (1,000 mg Oral Given 5/11/25 5238)   promethazine (PHENERGAN) tablet 25 mg (25 mg Oral Given 5/11/25 0638)         ORDERS PLACED DURING THIS VISIT:  Orders Placed This Encounter   Procedures    Comprehensive Metabolic Panel    Lipase    hCG, Serum, Qualitative    Urinalysis With Microscopic If Indicated (No Culture) - Urine, Clean Catch    Urine Drug Screen - Urine, Clean Catch    CBC Auto Differential    Fentanyl, Urine - Urine,  Clean Catch    Urinalysis, Microscopic Only - Urine, Clean Catch    Insert Peripheral IV    CBC & Differential         OUTPATIENT MEDICATION MANAGEMENT:  Current Facility-Administered Medications Ordered in Epic   Medication Dose Route Frequency Provider Last Rate Last Admin    sodium chloride 0.9 % flush 10 mL  10 mL Intravenous PRN Jenny Greer PA-C         Current Outpatient Medications Ordered in Epic   Medication Sig Dispense Refill    albuterol sulfate  (90 Base) MCG/ACT inhaler Inhale 2 puffs 4 (Four) Times a Day.      azelastine (ASTELIN) 0.1 % nasal spray 2 sprays into the nostril(s) as directed by provider 2 (Two) Times a Day. 30 mL 6    Banophen 25 MG capsule Take 1 capsule by mouth 2 (Two) Times a Day.      cetirizine (zyrTEC) 5 MG tablet Take 1 tablet by mouth Every Morning.      Denta 5000 Plus 1.1 % cream Take 1 dose by mouth Every Night.      famotidine (PEPCID) 20 MG tablet Take 1 tablet by mouth 2 (Two) Times a Day. 20 tablet 0    ibuprofen (ADVIL,MOTRIN) 800 MG tablet Take 1 tablet by mouth Every 8 (Eight) Hours As Needed (pain. take with food.). (Patient not taking: Reported on 3/3/2025) 30 tablet 0    montelukast (Singulair) 10 MG tablet Take 1 tablet by mouth Every Night. 30 tablet 3    norelgestromin-ethinyl estradiol (Zafemy) 150-35 MCG/24HR Place 1 patch on the skin as directed by provider 1 (One) Time Per Week for 30 days. Leave patch off the fourth week for a period. 3 patch 11    ondansetron ODT (ZOFRAN-ODT) 4 MG disintegrating tablet Place 1 tablet on the tongue 4 (Four) Times a Day As Needed for Nausea. 20 tablet 0    pantoprazole (PROTONIX) 40 MG EC tablet Take 1 tablet by mouth 2 (Two) Times a Day Before Meals. 60 tablet 0    Scopolamine 1 MG/3DAYS patch Place 1 patch on the skin as directed by provider Every 72 (Seventy-Two) Hours. 10 each 0             PROGRESS, DATA ANALYSIS, CONSULTS, AND MEDICAL DECISION MAKING  All labs have been independently interpreted by me.   All radiology studies have been reviewed by me. All EKG's have been independently viewed and interpreted by me.  Discussion below represents my analysis of pertinent findings related to patient's condition, differential diagnosis, treatment plan and final disposition.      DIFFERENTIAL DIAGNOSIS INCLUDE BUT NOT LIMITED TO:     Differential diagnosis includes but is not limited to:  - Hepatobiliary pathology such as cholecystitis, cholangitis, and symptomatic cholelithiasis  - Pancreatitis  - Dyspepsia  - Small bowel obstruction  - Appendicitis  - Diverticulitis  - UTI including pyelonephritis  - Ureteral stone  - Zoster  - Colitis, including infectious and ischemic  - Atypical ACS      Clinical Scores: N/A      ED Course as of 05/11/25 0705   Sun May 11, 2025   0513 Look: Patient presents to emergency department with abdominal pain, vomiting, lightheadedness.  Symptoms started at midnight.  Has tried to drink water but is unable to keep it down.  No known sick contacts.  No injury or trauma to the abdomen.  No other systemic complaints at this time.    Will initiate workup with labs and urinalysis.  IV fluid, Reglan, Pepcid ordered.  Will defer additional workup to oncoming provider. [MP]   0605 WBC(!): 10.93 [DC]   0611 THC Screen, Urine(!): Positive [DC]   0631 Patient reassessed.  She reports improvement of her nausea but continues to feel somewhat sick to her stomach as well as a mild headache.  Plan to treat with Phenergan and Tylenol p.o. at this time for p.o. challenge and symptom control. [DC]   0702 Patient now reports good symptom improvement and asking for discharge within the next 15 minutes.  Her workup is very reassuring and she now tolerates p.o. intake.  Plan to have her follow-up with PCP as an outpatient and treat symptomatically for nausea and vomiting.  Potentially related to cannabinoid use. [DC]      ED Course User Index  [DC] Melo Richardson PA  [MP] Jenny Greer PAMARICHUY         0705 I  rechecked the patient.  I discussed the patient's labs, radiology findings (including all incidental findings), diagnosis, and plan for discharge.  A repeat exam reveals no new worrisome changes from my initial exam findings.  The patient understands that the fact that they are being discharged does not denote that nothing is abnormal, it indicates that no clinical emergency is present and that they must follow-up as directed in order to properly maintain their health.  Follow-up instructions (specifically listed below) and return to ER precautions were given at this time.  I specifically instructed the patient to follow-up with their PCP.  The patient understands and agrees with the plan, and is ready for discharge.  All questions answered.         AS OF 07:05 EDT VITALS:    BP - 108/72  HR - 109  TEMP - 99.1 °F (37.3 °C)  O2 SATS - 99%    COMPLEXITY OF CARE  Admission was considered but after careful review of the patient's presentation, physical examination, diagnostic results, and response to treatment the patient may be safely discharged with outpatient follow-up.      DIAGNOSIS  Final diagnoses:   Nausea and vomiting, unspecified vomiting type   Marijuana use         DISPOSITION  ED Disposition       ED Disposition   Discharge    Condition   Stable    Comment   --                Please note that portions of this document were completed with a voice recognition program.    Note Disclaimer: At [facility], we believe that sharing information builds trust and better relationships. You are receiving this note because you recently visited [facility]. It is possible you will see health information before a provider has talked with you about it. This kind of information can be easy to misunderstand. To help you fully understand what it means for your health, we urge you to discuss this note with your provider.         Melo Richardson PA  05/11/25 0705

## 2025-05-11 NOTE — ED PROVIDER NOTES
EMERGENCY DEPARTMENT MD ATTESTATION NOTE    Room Number:  04/04  PCP: System, Provider Not In  Independent Historians: Patient and Family    HPI:  A complete HPI/ROS/PMH/PSH/SH/FH are unobtainable due to: None    Chronic or social conditions impacting patient care (Social Determinants of Health): None      Context: Dariel Schilling is a 19 y.o. female with a medical history of chronic chest pain, nausea vomiting, marijuana use who presents to the ED c/o acute nausea and vomiting.  The patient reports since midnight she has had nausea and vomiting.  She reports she has had similar episodes in the past.  She denies any constipation or diarrhea.  She reports she has antiemetics at home.        Review of prior external notes (non-ED) -and- Review of prior external test results outside of this encounter:  Laboratory evaluation 3/27/2025 shows a CO2 of 18    Prescription drug monitoring program review:           PHYSICAL EXAM    I have reviewed the triage vital signs and nursing notes.    ED Triage Vitals   Temp Heart Rate Resp BP SpO2   05/11/25 0457 05/11/25 0456 05/11/25 0456 05/11/25 0459 05/11/25 0456   99.1 °F (37.3 °C) 109 20 108/72 99 %      Temp src Heart Rate Source Patient Position BP Location FiO2 (%)   -- -- 05/11/25 0459 05/11/25 0459 --     Sitting Right arm        Physical Exam  GENERAL: Awake, alert, no acute distress  SKIN: Warm, dry  HENT: Normocephalic, atraumatic  EYES: no scleral icterus  CV: regular rhythm, regular rate  RESPIRATORY: normal effort, lungs clear  ABDOMEN: soft, nontender, nondistended  MUSCULOSKELETAL: no deformity  NEURO: alert, moves all extremities, follows commands            MEDICATIONS GIVEN IN ER  Medications   sodium chloride 0.9 % flush 10 mL (has no administration in time range)   lactated ringers bolus 1,000 mL (1,000 mL Intravenous New Bag 5/11/25 5118)   metoclopramide (REGLAN) injection 10 mg (10 mg Intravenous Given 5/11/25 8531)   famotidine (PEPCID) injection 20 mg  (20 mg Intravenous Given 5/11/25 0543)   acetaminophen (TYLENOL) tablet 1,000 mg (1,000 mg Oral Given 5/11/25 0638)   promethazine (PHENERGAN) tablet 25 mg (25 mg Oral Given 5/11/25 0638)         ORDERS PLACED DURING THIS VISIT:  Orders Placed This Encounter   Procedures    Comprehensive Metabolic Panel    Lipase    hCG, Serum, Qualitative    Urinalysis With Microscopic If Indicated (No Culture) - Urine, Clean Catch    Urine Drug Screen - Urine, Clean Catch    CBC Auto Differential    Fentanyl, Urine - Urine, Clean Catch    Urinalysis, Microscopic Only - Urine, Clean Catch    Insert Peripheral IV    CBC & Differential         PROCEDURES  Procedures            PROGRESS, DATA ANALYSIS, CONSULTS, AND MEDICAL DECISION MAKING  All labs have been independently interpreted by me.  All radiology studies have been reviewed by me. All EKG's have been independently viewed and interpreted by me.  Discussion below represents my analysis of pertinent findings related to patient's condition, differential diagnosis, treatment plan and final disposition.    Differential diagnosis includes but is not limited to hyperemesis, dehydration, renal failure, anemia, appendicitis, cholecystitis,.    Clinical Scores:                                     ED Course as of 05/11/25 0745   Sun May 11, 2025   0513 Look: Patient presents to emergency department with abdominal pain, vomiting, lightheadedness.  Symptoms started at midnight.  Has tried to drink water but is unable to keep it down.  No known sick contacts.  No injury or trauma to the abdomen.  No other systemic complaints at this time.    Will initiate workup with labs and urinalysis.  IV fluid, Reglan, Pepcid ordered.  Will defer additional workup to oncoming provider. [MP]   0605 WBC(!): 10.93 [DC]   0611 THC Screen, Urine(!): Positive [DC]   0631 Patient reassessed.  She reports improvement of her nausea but continues to feel somewhat sick to her stomach as well as a mild headache.   Plan to treat with Phenergan and Tylenol p.o. at this time for p.o. challenge and symptom control. [DC]   0702 Patient now reports good symptom improvement and asking for discharge within the next 15 minutes.  Her workup is very reassuring and she now tolerates p.o. intake.  Plan to have her follow-up with PCP as an outpatient and treat symptomatically for nausea and vomiting.  Potentially related to cannabinoid use. [DC]   0745 Hemoglobin(!): 11.9 [TR]   0745 WBC(!): 10.93 [TR]   0745 Potassium: 3.5 [TR]   0745 THC Screen, Urine(!): Positive [TR]   0745 Lipase: 16 [TR]   0745 HCG Qualitative: Negative [TR]      ED Course User Index  [DC] Melo Richardson PA  [MP] Jenny Greer PA-C  [TR] Nicola Morton MD       MDM: The patient has had previous similar episodes.  She has antiemetics at home but they are not working.  Plan laboratory evaluation.  Will provide her symptomatic control.  Her abdominal exam is benign.  I do not think she needs imaging unless she has significant laboratory abnormalities.      COMPLEXITY OF CARE  Admission was considered but after careful review of the patient's presentation, physical examination, diagnostic results, and response to treatment the patient may be safely discharged with outpatient follow-up.    Please note that portions of this document were completed with a voice recognition program.    Note Disclaimer: At [facility], we believe that sharing information builds trust and better relationships. You are receiving this note because you recently visited [facility]. It is possible you will see health information before a provider has talked with you about it. This kind of information can be easy to misunderstand. To help you fully understand what it means for your health, we urge you to discuss this note with your provider.         Nicola Morton MD  05/11/25 0724       Nicola Morton MD  05/11/25 0782

## 2025-05-14 ENCOUNTER — OFFICE VISIT (OUTPATIENT)
Dept: FAMILY MEDICINE CLINIC | Facility: CLINIC | Age: 20
End: 2025-05-14

## 2025-05-14 VITALS
SYSTOLIC BLOOD PRESSURE: 100 MMHG | HEIGHT: 65 IN | HEART RATE: 102 BPM | WEIGHT: 133.2 LBS | OXYGEN SATURATION: 97 % | BODY MASS INDEX: 22.19 KG/M2 | DIASTOLIC BLOOD PRESSURE: 60 MMHG

## 2025-05-14 DIAGNOSIS — Z86.2 HISTORY OF ANEMIA: ICD-10-CM

## 2025-05-14 DIAGNOSIS — K21.9 GASTROESOPHAGEAL REFLUX DISEASE, UNSPECIFIED WHETHER ESOPHAGITIS PRESENT: Primary | ICD-10-CM

## 2025-05-14 DIAGNOSIS — R09.81 SINUS CONGESTION: ICD-10-CM

## 2025-05-14 DIAGNOSIS — G89.29 CHRONIC ABDOMINAL PAIN: ICD-10-CM

## 2025-05-14 DIAGNOSIS — R11.2 NAUSEA AND VOMITING, UNSPECIFIED VOMITING TYPE: ICD-10-CM

## 2025-05-14 DIAGNOSIS — Z90.49 HISTORY OF CHOLECYSTECTOMY: ICD-10-CM

## 2025-05-14 DIAGNOSIS — R10.9 CHRONIC ABDOMINAL PAIN: ICD-10-CM

## 2025-05-14 DIAGNOSIS — J30.2 SEASONAL ALLERGIES: Primary | ICD-10-CM

## 2025-05-14 PROBLEM — R65.10 SIRS (SYSTEMIC INFLAMMATORY RESPONSE SYNDROME): Status: RESOLVED | Noted: 2024-10-20 | Resolved: 2025-05-14

## 2025-05-14 PROBLEM — R07.9 CHRONIC CHEST PAIN: Status: RESOLVED | Noted: 2024-05-29 | Resolved: 2025-05-14

## 2025-05-14 PROBLEM — K82.8 BILIARY DYSKINESIA: Status: RESOLVED | Noted: 2024-10-19 | Resolved: 2025-05-14

## 2025-05-14 PROBLEM — Z00.00 ROUTINE HEALTH MAINTENANCE: Status: RESOLVED | Noted: 2024-05-08 | Resolved: 2025-05-14

## 2025-05-14 PROBLEM — B99.9 RECURRENT INFECTIONS: Status: RESOLVED | Noted: 2024-07-09 | Resolved: 2025-05-14

## 2025-05-14 PROBLEM — R53.1 LEFT-SIDED WEAKNESS: Status: RESOLVED | Noted: 2023-10-04 | Resolved: 2025-05-14

## 2025-05-14 PROBLEM — J45.20 MILD INTERMITTENT ASTHMA WITHOUT COMPLICATION: Status: ACTIVE | Noted: 2024-07-09

## 2025-05-14 PROCEDURE — 99214 OFFICE O/P EST MOD 30 MIN: CPT

## 2025-05-14 RX ORDER — CETIRIZINE HYDROCHLORIDE 10 MG/1
10 TABLET ORAL DAILY
Qty: 90 TABLET | Refills: 1 | Status: SHIPPED | OUTPATIENT
Start: 2025-05-14

## 2025-05-14 RX ORDER — DICYCLOMINE HYDROCHLORIDE 10 MG/1
10 CAPSULE ORAL
Qty: 240 CAPSULE | Refills: 1 | Status: SHIPPED | OUTPATIENT
Start: 2025-05-14

## 2025-05-14 RX ORDER — DIPHENHYDRAMINE HYDROCHLORIDE 25 MG/1
25 CAPSULE ORAL DAILY
Qty: 90 CAPSULE | Refills: 1 | Status: CANCELLED | OUTPATIENT
Start: 2025-05-14

## 2025-05-14 RX ORDER — PANTOPRAZOLE SODIUM 40 MG/1
40 TABLET, DELAYED RELEASE ORAL DAILY
Qty: 60 TABLET | Refills: 0 | Status: SHIPPED | OUTPATIENT
Start: 2025-05-14

## 2025-05-14 NOTE — PROGRESS NOTES
Subjective   Dariel Schilling is a 19 y.o. female.     Patient or patient representative verbalized consent for the use of Ambient Listening during the visit with  TRACI Espinoza for chart documentation. 5/14/2025  15:14 EDT    Chief Complaint   Patient presents with    Hospital Follow Up Visit     ER for nausea and vomiting     History of Present Illness  The patient is a 19-year-old female who presents for evaluation of seasonal allergies, asthma, GERD, anemia, and sinus pressure.    Seasonal Allergies and Sinus Pressure  She has been experiencing severe sinus pressure for the past week, which has not been alleviated by Benadryl, Benadryl Congestion, or nasal decongestants. She has not yet tried Sudafed, although she has found it effective in the past. She has not consulted an allergist or immunoallergist. She was previously under the care of Dr. Beba Gaytan for approximately 2 to 3 months but has not had a primary care physician since then. She reports no additional allergies beyond seasonal ones and does not experience itching around dogs or cats. She is currently on Banophen, an allergy medication, and requires a refill. She also takes over-the-counter Zyrtec as needed but occasionally forgets to take it. She has been using Banophen since last year and takes it consistently, while her use of Zyrtec is intermittent. She does not use nasal sprays and takes Banophen once daily at a dose of 25 mg.  - Onset: Severe sinus pressure for the past week.  - Location: Sinus area.  - Duration: Past week.  - Character: Severe sinus pressure.  - Alleviating/Aggravating Factors: Not alleviated by Benadryl, Benadryl Congestion, or nasal decongestants; has found Sudafed effective in the past.  - Severity: Severe.    Asthma  She uses an albuterol inhaler as needed for mild asthma, which is exacerbated by hot weather or strenuous activity, resulting in chest discomfort. She does not require a refill of her albuterol  inhaler.  - Onset: Exacerbated by hot weather or strenuous activity.  - Location: Chest.  - Character: Mild asthma with chest discomfort.  - Alleviating/Aggravating Factors: Uses albuterol inhaler as needed.  - Severity: Mild.    GERD and Related Symptoms  She is not currently taking famotidine, which she previously used for menstrual-related stomach issues. She is on a birth control patch and has refills available. She has Zofran on hand, prescribed by her pediatrician and the hospital. She is not taking pantoprazole, which was prescribed by a gastroenterologist in late 2023. She underwent a colonoscopy and endoscopy, which revealed mild inflammation. She was prescribed dicyclomine, which she found beneficial, but discontinued both medications due to lack of insurance. She is considering resuming these medications now that she has insurance. She experiences chronic nausea and vomiting. She has been diagnosed with GERD, which she believes is the cause of her chest pain. She has undergone x-rays, which were unremarkable, but experiences stomach pain concurrent with her chest pain.  - Onset: Chronic nausea and vomiting; chest pain believed to be caused by GERD.  - Location: Chest and stomach.  - Duration: Chronic.  - Character: Nausea, vomiting, chest pain, stomach pain.  - Alleviating/Aggravating Factors: Previously used famotidine and dicyclomine; considering resuming medications now that she has insurance.  - Severity: Mild inflammation found in colonoscopy and endoscopy; unremarkable x-rays.    Anemia  She has a history of anemia and has received iron transfusions in the past due to poor absorption. Her hemoglobin levels have been slightly below normal, but she is not overly concerned as she does not believe iron supplementation would be beneficial. She was tested for sickle cell anemia, but the results were inconclusive. 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  - Onset: History of anemia.  -  Character: Slightly below normal hemoglobin levels; poor absorption; inconclusive sickle cell anemia test.  - Severity: Not overly concerned.    Endometriosis  She has been diagnosed with endometriosis and has been menstruating for approximately 14 to 15 days. She does not feel like she is losing a lot of blood though. She is currently on a birth control patch and has refills available.  - Onset: Menstruating for approximately 14 to 15 days.  - Duration: 14 to 15 days.  - Character: Endometriosis; does not feel like losing a lot of blood.    She had her gallbladder removed in October 2024 due to dysfunction. She experienced left-sided weakness and nerve pain following dehydration last year, which required hospitalization. An MRI was performed to rule out stroke, but no abnormalities were found.    PAST SURGICAL HISTORY:  - Gallbladder removal in 10/2024    SOCIAL HISTORY  - Currently smokes marijuana    FAMILY HISTORY  - Mother is anemic  - Grandmother is anemic  - No family history of sickle cell anemia    The following portions of the patient's history were reviewed and updated as appropriate: allergies, current medications, past family history, past medical history, past social history, past surgical history and problem list.    Results         Objective     Vitals:    05/14/25 1435   BP: 100/60   Pulse: 102   SpO2: 97%      Body mass index is 22.17 kg/m².    Physical Exam  Vitals reviewed.   Constitutional:       Appearance: Normal appearance.   HENT:      Mouth/Throat:      Mouth: Mucous membranes are moist.      Pharynx: Oropharynx is clear. No oropharyngeal exudate or posterior oropharyngeal erythema.   Eyes:      Conjunctiva/sclera: Conjunctivae normal.   Cardiovascular:      Rate and Rhythm: Normal rate.   Pulmonary:      Effort: Pulmonary effort is normal.   Musculoskeletal:         General: Normal range of motion.   Skin:     General: Skin is warm and dry.   Neurological:      Mental Status: She is alert  and oriented to person, place, and time.   Psychiatric:         Behavior: Behavior normal.         Assessment & Plan  1. Seasonal allergies:  - Discontinue Banophen (Benadryl) and take Zyrtec (cetirizine) daily  - Prescription for Zyrtec will be provided  - Discontinue Montelukast  - Take Zyrtec daily and use Banophen only as needed for acute symptoms    2. Asthma:  - Continue using albuterol inhaler as needed  - Symptoms are mild and occur primarily with exertion on hot days  - No need for additional albuterol inhaler at this time  - Monitor asthma symptoms and use inhaler as required    3. Gastroesophageal reflux disease (GERD):  - Prescription for pantoprazole 40 mg daily for 8 weeks  - Symptoms include chest pain associated with acid reflux  - Referral to gastroenterologist at [facility]  - Follow up with GI for further evaluation and management    4. Chronic abdominal pain:  - Prescription for dicyclomine to be taken 4 times daily before meals and at bedtime  - Symptoms include abdominal cramping, particularly in the morning and midday  - Take dicyclomine when cramping is worst  - Follow up with GI for further evaluation and management    5. Anemia:  - Continue monitoring anemia and consider further evaluation if symptoms persist or worsen  - History of requiring iron transfusions due to poor absorption  - Monitor hemoglobin levels and follow up if anemia symptoms worsen    6. Sinus pressure:  - Discontinue Banophen (Benadryl) and take Zyrtec (cetirizine) daily  - Prescription for Zyrtec will be provided  - Discontinue Montelukast  - Use nasal saline rinses, take steamy showers, and apply warm compresses for congestion relief  - Contact via [employer] message if symptoms persist or worsen by Friday    7. Endometriosis:  - Continue current management plan and follow up with OB-GYN as needed  - Symptoms include prolonged menstrual bleeding  - Monitor symptoms and ensure adequate fluid intake to prevent  anemia    Follow-up:  - Patient will follow up in September 2025       Discussed Care Gaps, ordered referrals and encouraged vaccination updates.       - Pt agrees with plan of care and denies further questions/concerns today  - This document is intended for medical expert use only. Persons  reading this document without medical staff guidance may result in misinterpretation and unintended morbidity     Go to the ER for any possible life-threatening symptoms such as chest pain or shortness of air.      Please allow 3-5 business days for recommendations based on new results      I personally spent time with this patient, preparing for the visit, reviewing tests, obtaining and/or reviewing a separately obtained history, performing a medically appropriate examination and/or evaluation, counseling and educating the patient/family/caregiver, ordering medications,  documenting information in the medical record and indepentently interpreting results.

## 2025-05-15 ENCOUNTER — TELEPHONE (OUTPATIENT)
Dept: OBSTETRICS AND GYNECOLOGY | Facility: CLINIC | Age: 20
End: 2025-05-15

## 2025-05-16 ENCOUNTER — PATIENT ROUNDING (BHMG ONLY) (OUTPATIENT)
Dept: FAMILY MEDICINE CLINIC | Facility: CLINIC | Age: 20
End: 2025-05-16

## 2025-05-16 NOTE — PROGRESS NOTES
A [employer] message has been sent to the patient for [place] [facility]

## 2025-05-25 ENCOUNTER — HOSPITAL ENCOUNTER (EMERGENCY)
Facility: HOSPITAL | Age: 20
Discharge: HOME | End: 2025-05-25
Attending: EMERGENCY MEDICINE | Admitting: EMERGENCY MEDICINE
Payer: MEDICAID

## 2025-05-25 VITALS
BODY MASS INDEX: 22.16 KG/M2 | WEIGHT: 133 LBS | DIASTOLIC BLOOD PRESSURE: 64 MMHG | HEART RATE: 67 BPM | RESPIRATION RATE: 16 BRPM | OXYGEN SATURATION: 100 % | SYSTOLIC BLOOD PRESSURE: 99 MMHG | HEIGHT: 65 IN | TEMPERATURE: 97.8 F

## 2025-05-25 DIAGNOSIS — R10.9 ABDOMINAL PAIN, UNSPECIFIED ABDOMINAL LOCATION: ICD-10-CM

## 2025-05-25 DIAGNOSIS — E86.0 DEHYDRATION: Primary | ICD-10-CM

## 2025-05-25 LAB
ALBUMIN SERPL-MCNC: 4 G/DL (ref 3.5–5.2)
ALBUMIN/GLOB SERPL: 1.3 G/DL
ALP SERPL-CCNC: 59 U/L (ref 39–117)
ALT SERPL W P-5'-P-CCNC: 8 U/L (ref 1–33)
AMPHET+METHAMPHET UR QL: NEGATIVE
AMPHETAMINES UR QL: NEGATIVE
ANION GAP SERPL CALCULATED.3IONS-SCNC: 8.5 MMOL/L (ref 5–15)
AST SERPL-CCNC: 12 U/L (ref 1–32)
BACTERIA UR QL AUTO: ABNORMAL /HPF
BARBITURATES UR QL SCN: NEGATIVE
BASOPHILS # BLD AUTO: 0.02 10*3/MM3 (ref 0–0.2)
BASOPHILS NFR BLD AUTO: 0.4 % (ref 0–1.5)
BENZODIAZ UR QL SCN: NEGATIVE
BILIRUB SERPL-MCNC: <0.2 MG/DL (ref 0–1.2)
BILIRUB UR QL STRIP: NEGATIVE
BUN SERPL-MCNC: 6 MG/DL (ref 6–20)
BUN/CREAT SERPL: 8.5 (ref 7–25)
BUPRENORPHINE SERPL-MCNC: NEGATIVE NG/ML
CALCIUM SPEC-SCNC: 8.8 MG/DL (ref 8.6–10.5)
CANNABINOIDS SERPL QL: POSITIVE
CHLORIDE SERPL-SCNC: 106 MMOL/L (ref 98–107)
CLARITY UR: CLEAR
CO2 SERPL-SCNC: 21.5 MMOL/L (ref 22–29)
COCAINE UR QL: NEGATIVE
COLOR UR: YELLOW
CREAT SERPL-MCNC: 0.71 MG/DL (ref 0.57–1)
DEPRECATED RDW RBC AUTO: 47 FL (ref 37–54)
EGFRCR SERPLBLD CKD-EPI 2021: 125 ML/MIN/1.73
EOSINOPHIL # BLD AUTO: 0.15 10*3/MM3 (ref 0–0.4)
EOSINOPHIL NFR BLD AUTO: 2.8 % (ref 0.3–6.2)
ERYTHROCYTE [DISTWIDTH] IN BLOOD BY AUTOMATED COUNT: 16 % (ref 12.3–15.4)
FENTANYL UR-MCNC: NEGATIVE NG/ML
GLOBULIN UR ELPH-MCNC: 3.2 GM/DL
GLUCOSE SERPL-MCNC: 112 MG/DL (ref 65–99)
GLUCOSE UR STRIP-MCNC: NEGATIVE MG/DL
HCG SERPL QL: NEGATIVE
HCT VFR BLD AUTO: 34.2 % (ref 34–46.6)
HGB BLD-MCNC: 10.7 G/DL (ref 12–15.9)
HGB UR QL STRIP.AUTO: NEGATIVE
HYALINE CASTS UR QL AUTO: ABNORMAL /LPF
IMM GRANULOCYTES # BLD AUTO: 0.01 10*3/MM3 (ref 0–0.05)
IMM GRANULOCYTES NFR BLD AUTO: 0.2 % (ref 0–0.5)
KETONES UR QL STRIP: NEGATIVE
LEUKOCYTE ESTERASE UR QL STRIP.AUTO: ABNORMAL
LIPASE SERPL-CCNC: 24 U/L (ref 13–60)
LYMPHOCYTES # BLD AUTO: 1.35 10*3/MM3 (ref 0.7–3.1)
LYMPHOCYTES NFR BLD AUTO: 25.6 % (ref 19.6–45.3)
MAGNESIUM SERPL-MCNC: 2.1 MG/DL (ref 1.7–2.2)
MCH RBC QN AUTO: 25.3 PG (ref 26.6–33)
MCHC RBC AUTO-ENTMCNC: 31.3 G/DL (ref 31.5–35.7)
MCV RBC AUTO: 80.9 FL (ref 79–97)
METHADONE UR QL SCN: NEGATIVE
MONOCYTES # BLD AUTO: 0.24 10*3/MM3 (ref 0.1–0.9)
MONOCYTES NFR BLD AUTO: 4.5 % (ref 5–12)
NEUTROPHILS NFR BLD AUTO: 3.51 10*3/MM3 (ref 1.7–7)
NEUTROPHILS NFR BLD AUTO: 66.5 % (ref 42.7–76)
NITRITE UR QL STRIP: NEGATIVE
NRBC BLD AUTO-RTO: 0 /100 WBC (ref 0–0.2)
OPIATES UR QL: POSITIVE
OXYCODONE UR QL SCN: NEGATIVE
PCP UR QL SCN: NEGATIVE
PH UR STRIP.AUTO: >=9 [PH] (ref 5–8)
PLATELET # BLD AUTO: 362 10*3/MM3 (ref 140–450)
PMV BLD AUTO: 9.5 FL (ref 6–12)
POTASSIUM SERPL-SCNC: 3.8 MMOL/L (ref 3.5–5.2)
PROT SERPL-MCNC: 7.2 G/DL (ref 6–8.5)
PROT UR QL STRIP: ABNORMAL
RBC # BLD AUTO: 4.23 10*6/MM3 (ref 3.77–5.28)
RBC # UR STRIP: ABNORMAL /HPF
REF LAB TEST METHOD: ABNORMAL
SODIUM SERPL-SCNC: 136 MMOL/L (ref 136–145)
SP GR UR STRIP: 1.01 (ref 1–1.03)
SQUAMOUS #/AREA URNS HPF: ABNORMAL /HPF
TRICYCLICS UR QL SCN: NEGATIVE
UROBILINOGEN UR QL STRIP: ABNORMAL
WBC # UR STRIP: ABNORMAL /HPF
WBC NRBC COR # BLD AUTO: 5.28 10*3/MM3 (ref 3.4–10.8)

## 2025-05-25 PROCEDURE — 25810000003 SODIUM CHLORIDE 0.9 % SOLUTION: Performed by: EMERGENCY MEDICINE

## 2025-05-25 PROCEDURE — 80053 COMPREHEN METABOLIC PANEL: CPT | Performed by: EMERGENCY MEDICINE

## 2025-05-25 PROCEDURE — 84703 CHORIONIC GONADOTROPIN ASSAY: CPT | Performed by: EMERGENCY MEDICINE

## 2025-05-25 PROCEDURE — 25010000002 ONDANSETRON PER 1 MG: Performed by: EMERGENCY MEDICINE

## 2025-05-25 PROCEDURE — 25010000002 MORPHINE PER 10 MG: Performed by: EMERGENCY MEDICINE

## 2025-05-25 PROCEDURE — 96375 TX/PRO/DX INJ NEW DRUG ADDON: CPT

## 2025-05-25 PROCEDURE — 83735 ASSAY OF MAGNESIUM: CPT | Performed by: EMERGENCY MEDICINE

## 2025-05-25 PROCEDURE — 83690 ASSAY OF LIPASE: CPT | Performed by: EMERGENCY MEDICINE

## 2025-05-25 PROCEDURE — 81001 URINALYSIS AUTO W/SCOPE: CPT | Performed by: EMERGENCY MEDICINE

## 2025-05-25 PROCEDURE — 99283 EMERGENCY DEPT VISIT LOW MDM: CPT

## 2025-05-25 PROCEDURE — 80307 DRUG TEST PRSMV CHEM ANLYZR: CPT | Performed by: EMERGENCY MEDICINE

## 2025-05-25 PROCEDURE — 85025 COMPLETE CBC W/AUTO DIFF WBC: CPT | Performed by: EMERGENCY MEDICINE

## 2025-05-25 PROCEDURE — 96374 THER/PROPH/DIAG INJ IV PUSH: CPT

## 2025-05-25 RX ORDER — SODIUM CHLORIDE 0.9 % (FLUSH) 0.9 %
10 SYRINGE (ML) INJECTION AS NEEDED
Status: DISCONTINUED | OUTPATIENT
Start: 2025-05-25 | End: 2025-05-25 | Stop reason: HOSPADM

## 2025-05-25 RX ORDER — ONDANSETRON 2 MG/ML
4 INJECTION INTRAMUSCULAR; INTRAVENOUS ONCE
Status: COMPLETED | OUTPATIENT
Start: 2025-05-25 | End: 2025-05-25

## 2025-05-25 RX ORDER — MORPHINE SULFATE 2 MG/ML
4 INJECTION, SOLUTION INTRAMUSCULAR; INTRAVENOUS ONCE
Status: COMPLETED | OUTPATIENT
Start: 2025-05-25 | End: 2025-05-25

## 2025-05-25 RX ADMIN — MORPHINE SULFATE 4 MG: 2 INJECTION, SOLUTION INTRAMUSCULAR; INTRAVENOUS at 04:50

## 2025-05-25 RX ADMIN — SODIUM CHLORIDE 1000 ML: 9 INJECTION, SOLUTION INTRAVENOUS at 04:51

## 2025-05-25 RX ADMIN — ONDANSETRON 4 MG: 2 INJECTION, SOLUTION INTRAMUSCULAR; INTRAVENOUS at 04:49

## 2025-05-25 NOTE — ED PROVIDER NOTES
EMERGENCY DEPARTMENT ENCOUNTER    Room Number:  13/13  PCP: Tricia Ruiz APRN  Patient Care Team:  Tricia Ruiz APRN as PCP - General (Family Medicine)  Yosef Hollis MD as Consulting Physician (Hematology and Oncology)  Beba Gaytan MD as Referring Physician (Internal Medicine)   Independent Historians: Patient    HPI:  Chief Complaint: Abdominal pain    A complete HPI/ROS/PMH/PSH/SH/FH are unobtainable due to: None    Chronic or social conditions impacting patient care (Social Determinants of Health): None  (Financial Resource Strain / Food Insecurity / Transportation Needs / Physical Activity / Stress / Social Connections / Intimate Partner Violence / Housing Stability)    Context: Dariel Schilling is a 19 y.o. female who presents to the ED c/o acute abdominal pain.  Patient has had history of chronic abdominal pain with unclear cause.  Patient is also had episodes of nausea vomiting.  Patient states That she is dehydrated.  Has had belly pain since awakening an hour or 2 ago.    Review of prior external notes (non-ED) -and- Review of prior external test results outside of this encounter: I have reviewed patient's OB/GYN note from 3/3/2025    Prescription drug monitoring program review:         PAST MEDICAL HISTORY  Active Ambulatory Problems     Diagnosis Date Noted    Chronic abdominal pain 05/29/2024    Seasonal allergies 05/29/2024    Nausea and vomiting 05/29/2024    Endometriosis 05/29/2024    Marijuana use 07/09/2024    Mild intermittent asthma without complication 07/09/2024    Gastroesophageal reflux disease 05/14/2025    History of cholecystectomy 05/14/2025    History of anemia 05/14/2025     Resolved Ambulatory Problems     Diagnosis Date Noted    Left-sided weakness 10/04/2023    Routine health maintenance 05/08/2024    Chronic cough 05/29/2024    Chronic chest pain 05/29/2024    Recurrent infections 07/09/2024    Intractable abdominal pain 10/19/2024    SIRS (systemic inflammatory  response syndrome) 10/20/2024    Biliary dyskinesia 10/19/2024     Past Medical History:   Diagnosis Date    Allergic     Anemia     Anxiety     Asthma     Chlamydia     Eczema     Visual impairment          PAST SURGICAL HISTORY  Past Surgical History:   Procedure Laterality Date    CHOLECYSTECTOMY WITH INTRAOPERATIVE CHOLANGIOGRAM N/A 10/23/2024    Procedure: CHOLECYSTECTOMY LAPAROSCOPIC INTRAOPERATIVE CHOLANGIOGRAM;  Surgeon: Lala Acosta MD;  Location: [facility] MAIN OR;  Service: General;  Laterality: N/A;    COLONOSCOPY      ENDOSCOPY N/A 10/21/2024    Procedure: ESOPHAGOGASTRODUODENOSCOPY with cold biopsies;  Surgeon: Viktoriya Parmar MD;  Location: [facility] ENDOSCOPY;  Service: Gastroenterology;  Laterality: N/A;  pre: nausea, vomitting  post: erosive gastritis    WISDOM TOOTH EXTRACTION           FAMILY HISTORY  Family History   Problem Relation Age of Onset    Asthma Mother     Hyperlipidemia Mother     Asthma Father     Arthritis Maternal Grandmother     Cancer Maternal Grandfather     Cancer Paternal Grandmother     Hyperlipidemia Paternal Grandmother     Vision loss Paternal Grandmother     Hyperlipidemia Paternal Grandfather     Cancer Maternal Great-Grandmother     Breast cancer Maternal Great-Grandmother          SOCIAL HISTORY  Social History     Socioeconomic History    Marital status: Single   Tobacco Use    Smoking status: Never    Smokeless tobacco: Never   Vaping Use    Vaping status: Never Used   Substance and Sexual Activity    Alcohol use: Never    Drug use: Yes     Types: Marijuana     Comment: every other day    Sexual activity: Not Currently     Partners: Female     Birth control/protection: Condom, Patch         ALLERGIES  Pomegranate [punica]        REVIEW OF SYSTEMS  Review of Systems  Included in HPI  All systems reviewed and negative except for those discussed in HPI.      PHYSICAL EXAM    I have reviewed the triage vital signs and nursing notes.    ED Triage Vitals   Temp Heart  Rate Resp BP SpO2   05/25/25 0429 05/25/25 0429 05/25/25 0429 05/25/25 0435 05/25/25 0429   97.8 °F (36.6 °C) 72 22 121/79 98 %      Temp src Heart Rate Source Patient Position BP Location FiO2 (%)   -- -- -- -- --              Physical Exam  GENERAL: alert, no acute distress  SKIN: Warm, dry  HENT: Normocephalic, atraumatic  EYES: no scleral icterus  CV: regular rhythm, regular rate  RESPIRATORY: normal effort, lungs clear  ABDOMEN: soft, mild generalized tenderness no guarding no rebound, nondistended  MUSCULOSKELETAL: no deformity  NEURO: alert, moves all extremities, follows commands                                                               LAB RESULTS  No results found for this or any previous visit (from the past 24 hours).      I ordered the above labs and independently reviewed the results.        RADIOLOGY  No Radiology Exams Resulted Within Past 24 Hours    I ordered the above noted radiological studies. Reviewed by me and discussed with radiologist.  See dictation for official radiology interpretation.      PROCEDURES    Procedures      MEDICATIONS GIVEN IN ER    Medications   sodium chloride 0.9 % bolus 1,000 mL (0 mL Intravenous Stopped 5/25/25 0634)   morphine injection 4 mg (4 mg Intravenous Given 5/25/25 0450)   ondansetron (ZOFRAN) injection 4 mg (4 mg Intravenous Given 5/25/25 0449)         ORDERS PLACED DURING THIS VISIT:  Orders Placed This Encounter   Procedures    Comprehensive Metabolic Panel    Lipase    hCG, Serum, Qualitative    Magnesium    Urinalysis With Microscopic If Indicated (No Culture) - Urine, Clean Catch    Urine Drug Screen - Urine, Clean Catch    CBC Auto Differential    Fentanyl, Urine - Urine, Clean Catch    Urinalysis, Microscopic Only - Urine, Clean Catch    Monitor Blood Pressure    Continuous Pulse Oximetry    CBC & Differential         PROGRESS, DATA ANALYSIS, CONSULTS, AND MEDICAL DECISION MAKING    All labs have been independently interpreted by me.  All radiology  studies have been reviewed by me and discussed with radiologist dictating the report.   EKG's independently viewed and interpreted by me.  Discussion below represents my analysis of pertinent findings related to patient's condition, differential diagnosis, treatment plan and final disposition.    Differential diagnosis includes but is not limited to:  Gastritis, gastroenteritis, peptic ulcer disease, GERD, esophageal perforation, acute appendicitis, mesenteric adenitis, Meckel’s diverticulum, epiploic appendagitis, diverticulitis, colon cancer, ulcerative colitis, Crohn’s disease, intussusception, small bowel obstruction, adhesions, ischemic bowel, perforated viscus, ileus, obstipation, biliary colic, cholecystitis, cholelithiasis, Spencer-Aaron Alonso, hepatitis, pancreatitis, common bile duct obstruction, cholangitis, bile leak, splenic trauma, splenic rupture, splenic infarction, splenic abscess, abdominal abscess, ascites, spontaneous bacterial peritonitis, hernia, UTI, cystitis, prostatitis, ureterolithiasis, urinary obstruction, ovarian cyst, torsion, pregnancy, ectopic pregnancy, PID, pelvic abscess, mittelschmerz, endometriosis, AAA, myocardial infarction, pneumonia, cancer, porphyria, DKA, medications, sickle cell, viral syndrome, zoster  .    Clinical Scores:              ED Course as of 05/26/25 0845   Sun May 25, 2025   0459 WBC: 5.28 [TJ]   0459 Hemoglobin(!): 10.7 [TJ]   0459 Platelets: 362 [TJ]   0522 HCG Qualitative: Negative [TJ]   0522 Lipase: 24 [TJ]   0522 Creatinine: 0.71 [TJ]   0522 Anion Gap: 8.5 [TJ]   0546 THC Screen, Urine(!): Positive [TJ]   0546 Creatinine: 0.71 [TJ]   0546 Potassium: 3.8 [TJ]   0546 WBC: 5.28  Assessment: Patient is doing better.  States that nausea has resolved.  Belly pain significantly improved.  Patient states she thinks she probably needed IV fluids. [TJ]      ED Course User Index  [TJ] Humberto Morales MD               PPE: The patient wore a mask and I wore an  N95 mask throughout the entire patient encounter.       AS OF 08:45 EDT VITALS:    BP - 99/64  HR - 67  TEMP - 97.8 °F (36.6 °C)  O2 SATS - 100%        DIAGNOSIS  Final diagnoses:   Dehydration   Abdominal pain, unspecified abdominal location         DISPOSITION  ED Disposition       ED Disposition   Discharge    Condition   Stable    Comment   --                  Note Disclaimer: At [facility], we believe that sharing information builds trust and better relationships. You are receiving this note because you recently visited [facility]. It is possible you will see health information before a provider has talked with you about it. This kind of information can be easy to misunderstand. To help you fully understand what it means for your health, we urge you to discuss this note with your provider.         Humberto Morales MD  05/26/25 0871

## 2025-06-26 ENCOUNTER — APPOINTMENT (OUTPATIENT)
Dept: CT IMAGING | Facility: HOSPITAL | Age: 20
End: 2025-06-26
Payer: MEDICAID

## 2025-06-26 ENCOUNTER — HOSPITAL ENCOUNTER (OUTPATIENT)
Facility: HOSPITAL | Age: 20
Setting detail: OBSERVATION
Discharge: HOME | End: 2025-06-27
Attending: EMERGENCY MEDICINE | Admitting: EMERGENCY MEDICINE
Payer: MEDICAID

## 2025-06-26 DIAGNOSIS — R11.2 NAUSEA AND VOMITING, UNSPECIFIED VOMITING TYPE: ICD-10-CM

## 2025-06-26 DIAGNOSIS — R10.31 ACUTE RIGHT LOWER QUADRANT PAIN: Primary | ICD-10-CM

## 2025-06-26 DIAGNOSIS — R50.9 ACUTE FEBRILE ILLNESS: ICD-10-CM

## 2025-06-26 PROBLEM — R10.9 ABDOMINAL PAIN: Status: ACTIVE | Noted: 2025-06-26

## 2025-06-26 LAB
ALBUMIN SERPL-MCNC: 4.1 G/DL (ref 3.5–5.2)
ALBUMIN/GLOB SERPL: 1.1 G/DL
ALP SERPL-CCNC: 66 U/L (ref 39–117)
ALT SERPL W P-5'-P-CCNC: 6 U/L (ref 1–33)
ANION GAP SERPL CALCULATED.3IONS-SCNC: 12.5 MMOL/L (ref 5–15)
AST SERPL-CCNC: 10 U/L (ref 1–32)
B PARAPERT DNA SPEC QL NAA+PROBE: NOT DETECTED
B PERT DNA SPEC QL NAA+PROBE: NOT DETECTED
BASOPHILS # BLD AUTO: 0.03 10*3/MM3 (ref 0–0.2)
BASOPHILS NFR BLD AUTO: 0.2 % (ref 0–1.5)
BILIRUB SERPL-MCNC: 0.3 MG/DL (ref 0–1.2)
BILIRUB UR QL STRIP: NEGATIVE
BUN SERPL-MCNC: 4 MG/DL (ref 6–20)
BUN/CREAT SERPL: 4.9 (ref 7–25)
C PNEUM DNA NPH QL NAA+NON-PROBE: NOT DETECTED
CALCIUM SPEC-SCNC: 9.3 MG/DL (ref 8.6–10.5)
CHLORIDE SERPL-SCNC: 103 MMOL/L (ref 98–107)
CLARITY UR: CLEAR
CO2 SERPL-SCNC: 20.5 MMOL/L (ref 22–29)
COLOR UR: YELLOW
CREAT SERPL-MCNC: 0.81 MG/DL (ref 0.57–1)
D-LACTATE SERPL-SCNC: 3.1 MMOL/L (ref 0.5–2)
DEPRECATED RDW RBC AUTO: 45.9 FL (ref 37–54)
EGFRCR SERPLBLD CKD-EPI 2021: 106.1 ML/MIN/1.73
EOSINOPHIL # BLD AUTO: 0.08 10*3/MM3 (ref 0–0.4)
EOSINOPHIL NFR BLD AUTO: 0.7 % (ref 0.3–6.2)
ERYTHROCYTE [DISTWIDTH] IN BLOOD BY AUTOMATED COUNT: 15.3 % (ref 12.3–15.4)
FLUAV SUBTYP SPEC NAA+PROBE: NOT DETECTED
FLUBV RNA ISLT QL NAA+PROBE: NOT DETECTED
GLOBULIN UR ELPH-MCNC: 3.6 GM/DL
GLUCOSE SERPL-MCNC: 92 MG/DL (ref 65–99)
GLUCOSE UR STRIP-MCNC: NEGATIVE MG/DL
HADV DNA SPEC NAA+PROBE: NOT DETECTED
HCG SERPL QL: NEGATIVE
HCOV 229E RNA SPEC QL NAA+PROBE: NOT DETECTED
HCOV HKU1 RNA SPEC QL NAA+PROBE: NOT DETECTED
HCOV NL63 RNA SPEC QL NAA+PROBE: NOT DETECTED
HCOV OC43 RNA SPEC QL NAA+PROBE: NOT DETECTED
HCT VFR BLD AUTO: 37.1 % (ref 34–46.6)
HGB BLD-MCNC: 11.5 G/DL (ref 12–15.9)
HGB UR QL STRIP.AUTO: NEGATIVE
HMPV RNA NPH QL NAA+NON-PROBE: NOT DETECTED
HOLD SPECIMEN: NORMAL
HPIV1 RNA ISLT QL NAA+PROBE: NOT DETECTED
HPIV2 RNA SPEC QL NAA+PROBE: NOT DETECTED
HPIV3 RNA NPH QL NAA+PROBE: NOT DETECTED
HPIV4 P GENE NPH QL NAA+PROBE: NOT DETECTED
IMM GRANULOCYTES # BLD AUTO: 0.05 10*3/MM3 (ref 0–0.05)
IMM GRANULOCYTES NFR BLD AUTO: 0.4 % (ref 0–0.5)
KETONES UR QL STRIP: ABNORMAL
LEUKOCYTE ESTERASE UR QL STRIP.AUTO: NEGATIVE
LIPASE SERPL-CCNC: 14 U/L (ref 13–60)
LYMPHOCYTES # BLD AUTO: 1.22 10*3/MM3 (ref 0.7–3.1)
LYMPHOCYTES NFR BLD AUTO: 9.9 % (ref 19.6–45.3)
M PNEUMO IGG SER IA-ACNC: NOT DETECTED
MCH RBC QN AUTO: 25.7 PG (ref 26.6–33)
MCHC RBC AUTO-ENTMCNC: 31 G/DL (ref 31.5–35.7)
MCV RBC AUTO: 83 FL (ref 79–97)
MONOCYTES # BLD AUTO: 0.65 10*3/MM3 (ref 0.1–0.9)
MONOCYTES NFR BLD AUTO: 5.3 % (ref 5–12)
NEUTROPHILS NFR BLD AUTO: 10.24 10*3/MM3 (ref 1.7–7)
NEUTROPHILS NFR BLD AUTO: 83.5 % (ref 42.7–76)
NITRITE UR QL STRIP: NEGATIVE
NRBC BLD AUTO-RTO: 0 /100 WBC (ref 0–0.2)
PH UR STRIP.AUTO: 8.5 [PH] (ref 5–8)
PLATELET # BLD AUTO: 302 10*3/MM3 (ref 140–450)
PMV BLD AUTO: 10 FL (ref 6–12)
POTASSIUM SERPL-SCNC: 3.5 MMOL/L (ref 3.5–5.2)
PROT SERPL-MCNC: 7.7 G/DL (ref 6–8.5)
PROT UR QL STRIP: NEGATIVE
RBC # BLD AUTO: 4.47 10*6/MM3 (ref 3.77–5.28)
RHINOVIRUS RNA SPEC NAA+PROBE: NOT DETECTED
RSV RNA NPH QL NAA+NON-PROBE: NOT DETECTED
SARS-COV-2 RNA NPH QL NAA+NON-PROBE: NOT DETECTED
SODIUM SERPL-SCNC: 136 MMOL/L (ref 136–145)
SP GR UR STRIP: 1.01 (ref 1–1.03)
UROBILINOGEN UR QL STRIP: ABNORMAL
WBC NRBC COR # BLD AUTO: 12.27 10*3/MM3 (ref 3.4–10.8)
WHOLE BLOOD HOLD COAG: NORMAL
WHOLE BLOOD HOLD SPECIMEN: NORMAL

## 2025-06-26 PROCEDURE — 36415 COLL VENOUS BLD VENIPUNCTURE: CPT | Performed by: EMERGENCY MEDICINE

## 2025-06-26 PROCEDURE — 0202U NFCT DS 22 TRGT SARS-COV-2: CPT | Performed by: EMERGENCY MEDICINE

## 2025-06-26 PROCEDURE — G0378 HOSPITAL OBSERVATION PER HR: HCPCS

## 2025-06-26 PROCEDURE — 25010000002 DIPHENHYDRAMINE PER 50 MG: Performed by: EMERGENCY MEDICINE

## 2025-06-26 PROCEDURE — 96376 TX/PRO/DX INJ SAME DRUG ADON: CPT

## 2025-06-26 PROCEDURE — 96374 THER/PROPH/DIAG INJ IV PUSH: CPT

## 2025-06-26 PROCEDURE — 74177 CT ABD & PELVIS W/CONTRAST: CPT

## 2025-06-26 PROCEDURE — 81003 URINALYSIS AUTO W/O SCOPE: CPT | Performed by: EMERGENCY MEDICINE

## 2025-06-26 PROCEDURE — 25010000002 METOCLOPRAMIDE PER 10 MG: Performed by: EMERGENCY MEDICINE

## 2025-06-26 PROCEDURE — 25010000002 MORPHINE PER 10 MG: Performed by: EMERGENCY MEDICINE

## 2025-06-26 PROCEDURE — 96375 TX/PRO/DX INJ NEW DRUG ADDON: CPT

## 2025-06-26 PROCEDURE — 87040 BLOOD CULTURE FOR BACTERIA: CPT

## 2025-06-26 PROCEDURE — 99285 EMERGENCY DEPT VISIT HI MDM: CPT

## 2025-06-26 PROCEDURE — 85025 COMPLETE CBC W/AUTO DIFF WBC: CPT | Performed by: EMERGENCY MEDICINE

## 2025-06-26 PROCEDURE — 25010000002 FAMOTIDINE 10 MG/ML SOLUTION: Performed by: EMERGENCY MEDICINE

## 2025-06-26 PROCEDURE — 83605 ASSAY OF LACTIC ACID: CPT

## 2025-06-26 PROCEDURE — 25010000002 ONDANSETRON PER 1 MG: Performed by: EMERGENCY MEDICINE

## 2025-06-26 PROCEDURE — 83690 ASSAY OF LIPASE: CPT | Performed by: EMERGENCY MEDICINE

## 2025-06-26 PROCEDURE — 25810000003 SODIUM CHLORIDE 0.9 % SOLUTION: Performed by: PHYSICIAN ASSISTANT

## 2025-06-26 PROCEDURE — 25510000001 IOPAMIDOL 61 % SOLUTION: Performed by: EMERGENCY MEDICINE

## 2025-06-26 PROCEDURE — 96361 HYDRATE IV INFUSION ADD-ON: CPT

## 2025-06-26 PROCEDURE — 84703 CHORIONIC GONADOTROPIN ASSAY: CPT | Performed by: EMERGENCY MEDICINE

## 2025-06-26 PROCEDURE — 25010000002 KETOROLAC TROMETHAMINE PER 15 MG: Performed by: EMERGENCY MEDICINE

## 2025-06-26 PROCEDURE — 80053 COMPREHEN METABOLIC PANEL: CPT | Performed by: EMERGENCY MEDICINE

## 2025-06-26 PROCEDURE — 25810000003 LACTATED RINGERS SOLUTION: Performed by: EMERGENCY MEDICINE

## 2025-06-26 RX ORDER — MORPHINE SULFATE 2 MG/ML
2 INJECTION, SOLUTION INTRAMUSCULAR; INTRAVENOUS ONCE
Status: COMPLETED | OUTPATIENT
Start: 2025-06-26 | End: 2025-06-26

## 2025-06-26 RX ORDER — LIDOCAINE HYDROCHLORIDE 20 MG/ML
5 SOLUTION OROPHARYNGEAL ONCE
Status: COMPLETED | OUTPATIENT
Start: 2025-06-26 | End: 2025-06-26

## 2025-06-26 RX ORDER — SODIUM CHLORIDE 0.9 % (FLUSH) 0.9 %
10 SYRINGE (ML) INJECTION AS NEEDED
Status: DISCONTINUED | OUTPATIENT
Start: 2025-06-26 | End: 2025-06-27 | Stop reason: HOSPADM

## 2025-06-26 RX ORDER — ONDANSETRON 2 MG/ML
4 INJECTION INTRAMUSCULAR; INTRAVENOUS ONCE
Status: COMPLETED | OUTPATIENT
Start: 2025-06-26 | End: 2025-06-26

## 2025-06-26 RX ORDER — FAMOTIDINE 10 MG/ML
20 INJECTION, SOLUTION INTRAVENOUS ONCE
Status: COMPLETED | OUTPATIENT
Start: 2025-06-26 | End: 2025-06-26

## 2025-06-26 RX ORDER — ALBUTEROL SULFATE 0.83 MG/ML
2.5 SOLUTION RESPIRATORY (INHALATION) EVERY 6 HOURS PRN
Status: CANCELLED | OUTPATIENT
Start: 2025-06-26

## 2025-06-26 RX ORDER — SUCRALFATE 1 G/1
1 TABLET ORAL
Status: DISCONTINUED | OUTPATIENT
Start: 2025-06-26 | End: 2025-06-27

## 2025-06-26 RX ORDER — PANTOPRAZOLE SODIUM 40 MG/10ML
40 INJECTION, POWDER, LYOPHILIZED, FOR SOLUTION INTRAVENOUS EVERY 12 HOURS SCHEDULED
Status: DISCONTINUED | OUTPATIENT
Start: 2025-06-26 | End: 2025-06-27

## 2025-06-26 RX ORDER — AMOXICILLIN 250 MG
2 CAPSULE ORAL 2 TIMES DAILY PRN
Status: DISCONTINUED | OUTPATIENT
Start: 2025-06-26 | End: 2025-06-27 | Stop reason: HOSPADM

## 2025-06-26 RX ORDER — POLYETHYLENE GLYCOL 3350 17 G/17G
17 POWDER, FOR SOLUTION ORAL DAILY PRN
Status: DISCONTINUED | OUTPATIENT
Start: 2025-06-26 | End: 2025-06-27 | Stop reason: HOSPADM

## 2025-06-26 RX ORDER — BISACODYL 5 MG/1
5 TABLET, DELAYED RELEASE ORAL DAILY PRN
Status: DISCONTINUED | OUTPATIENT
Start: 2025-06-26 | End: 2025-06-27 | Stop reason: HOSPADM

## 2025-06-26 RX ORDER — MORPHINE SULFATE 2 MG/ML
4 INJECTION, SOLUTION INTRAMUSCULAR; INTRAVENOUS ONCE
Status: COMPLETED | OUTPATIENT
Start: 2025-06-26 | End: 2025-06-26

## 2025-06-26 RX ORDER — ONDANSETRON 4 MG/1
4 TABLET, ORALLY DISINTEGRATING ORAL EVERY 6 HOURS PRN
Status: DISCONTINUED | OUTPATIENT
Start: 2025-06-26 | End: 2025-06-27 | Stop reason: HOSPADM

## 2025-06-26 RX ORDER — DIPHENHYDRAMINE HYDROCHLORIDE 50 MG/ML
25 INJECTION, SOLUTION INTRAMUSCULAR; INTRAVENOUS ONCE
Status: COMPLETED | OUTPATIENT
Start: 2025-06-26 | End: 2025-06-26

## 2025-06-26 RX ORDER — IOPAMIDOL 612 MG/ML
100 INJECTION, SOLUTION INTRAVASCULAR
Status: COMPLETED | OUTPATIENT
Start: 2025-06-26 | End: 2025-06-26

## 2025-06-26 RX ORDER — BISACODYL 10 MG
10 SUPPOSITORY, RECTAL RECTAL DAILY PRN
Status: DISCONTINUED | OUTPATIENT
Start: 2025-06-26 | End: 2025-06-27 | Stop reason: HOSPADM

## 2025-06-26 RX ORDER — SODIUM CHLORIDE 9 MG/ML
40 INJECTION, SOLUTION INTRAVENOUS AS NEEDED
Status: DISCONTINUED | OUTPATIENT
Start: 2025-06-26 | End: 2025-06-27 | Stop reason: HOSPADM

## 2025-06-26 RX ORDER — SODIUM CHLORIDE 9 MG/ML
125 INJECTION, SOLUTION INTRAVENOUS CONTINUOUS
Status: DISCONTINUED | OUTPATIENT
Start: 2025-06-26 | End: 2025-06-26

## 2025-06-26 RX ORDER — ALUMINA, MAGNESIA, AND SIMETHICONE 2400; 2400; 240 MG/30ML; MG/30ML; MG/30ML
15 SUSPENSION ORAL ONCE
Status: COMPLETED | OUTPATIENT
Start: 2025-06-26 | End: 2025-06-26

## 2025-06-26 RX ORDER — SODIUM CHLORIDE 0.9 % (FLUSH) 0.9 %
10 SYRINGE (ML) INJECTION EVERY 12 HOURS SCHEDULED
Status: DISCONTINUED | OUTPATIENT
Start: 2025-06-26 | End: 2025-06-27 | Stop reason: HOSPADM

## 2025-06-26 RX ORDER — KETOROLAC TROMETHAMINE 15 MG/ML
15 INJECTION, SOLUTION INTRAMUSCULAR; INTRAVENOUS ONCE
Status: COMPLETED | OUTPATIENT
Start: 2025-06-26 | End: 2025-06-26

## 2025-06-26 RX ORDER — METOCLOPRAMIDE HYDROCHLORIDE 5 MG/ML
10 INJECTION INTRAMUSCULAR; INTRAVENOUS ONCE
Status: COMPLETED | OUTPATIENT
Start: 2025-06-26 | End: 2025-06-26

## 2025-06-26 RX ORDER — ONDANSETRON 2 MG/ML
4 INJECTION INTRAMUSCULAR; INTRAVENOUS EVERY 6 HOURS PRN
Status: DISCONTINUED | OUTPATIENT
Start: 2025-06-26 | End: 2025-06-27 | Stop reason: HOSPADM

## 2025-06-26 RX ORDER — DICYCLOMINE HYDROCHLORIDE 10 MG/1
10 CAPSULE ORAL
Status: CANCELLED | OUTPATIENT
Start: 2025-06-26

## 2025-06-26 RX ORDER — SODIUM CHLORIDE 9 MG/ML
125 INJECTION, SOLUTION INTRAVENOUS CONTINUOUS
Status: DISCONTINUED | OUTPATIENT
Start: 2025-06-26 | End: 2025-06-27 | Stop reason: HOSPADM

## 2025-06-26 RX ADMIN — FAMOTIDINE 20 MG: 10 INJECTION INTRAVENOUS at 18:29

## 2025-06-26 RX ADMIN — ALUMINUM HYDROXIDE, MAGNESIUM HYDROXIDE, AND DIMETHICONE 15 ML: 400; 400; 40 SUSPENSION ORAL at 20:52

## 2025-06-26 RX ADMIN — LIDOCAINE HYDROCHLORIDE 5 ML: 20 SOLUTION ORAL at 20:53

## 2025-06-26 RX ADMIN — ONDANSETRON 4 MG: 2 INJECTION, SOLUTION INTRAMUSCULAR; INTRAVENOUS at 16:07

## 2025-06-26 RX ADMIN — DIPHENHYDRAMINE HYDROCHLORIDE 25 MG: 50 INJECTION, SOLUTION INTRAMUSCULAR; INTRAVENOUS at 20:56

## 2025-06-26 RX ADMIN — SODIUM CHLORIDE, POTASSIUM CHLORIDE, SODIUM LACTATE AND CALCIUM CHLORIDE 1000 ML: 600; 310; 30; 20 INJECTION, SOLUTION INTRAVENOUS at 18:29

## 2025-06-26 RX ADMIN — IOPAMIDOL 85 ML: 612 INJECTION, SOLUTION INTRAVENOUS at 17:16

## 2025-06-26 RX ADMIN — Medication 10 ML: at 20:13

## 2025-06-26 RX ADMIN — SODIUM CHLORIDE 125 ML/HR: 9 INJECTION, SOLUTION INTRAVENOUS at 23:23

## 2025-06-26 RX ADMIN — PANTOPRAZOLE SODIUM 40 MG: 40 INJECTION, POWDER, FOR SOLUTION INTRAVENOUS at 20:10

## 2025-06-26 RX ADMIN — SUCRALFATE 1 G: 1 TABLET ORAL at 20:59

## 2025-06-26 RX ADMIN — SODIUM CHLORIDE 125 ML/HR: 9 INJECTION, SOLUTION INTRAVENOUS at 20:10

## 2025-06-26 RX ADMIN — MORPHINE SULFATE 2 MG: 2 INJECTION, SOLUTION INTRAMUSCULAR; INTRAVENOUS at 18:29

## 2025-06-26 RX ADMIN — MORPHINE SULFATE 4 MG: 2 INJECTION, SOLUTION INTRAMUSCULAR; INTRAVENOUS at 20:59

## 2025-06-26 RX ADMIN — KETOROLAC TROMETHAMINE 15 MG: 15 INJECTION INTRAMUSCULAR at 16:28

## 2025-06-26 RX ADMIN — METOCLOPRAMIDE 10 MG: 5 INJECTION, SOLUTION INTRAMUSCULAR; INTRAVENOUS at 20:54

## 2025-06-26 RX ADMIN — SODIUM CHLORIDE, POTASSIUM CHLORIDE, SODIUM LACTATE AND CALCIUM CHLORIDE 1000 ML: 600; 310; 30; 20 INJECTION, SOLUTION INTRAVENOUS at 16:29

## 2025-06-26 NOTE — ED NOTES
"Nursing report ED to floor  Dariel Schilling  20 y.o.  female    HPI :  HPI  Stated Reason for Visit: facial pain , fever, HA, vomiting, abd pain since yesterday    Chief Complaint  Chief Complaint   Patient presents with    Fever    Abdominal Pain       Admitting doctor:   Humberto HURT MD    Admitting diagnosis:   The primary encounter diagnosis was Acute right lower quadrant pain. Diagnoses of Acute febrile illness and Nausea and vomiting, unspecified vomiting type were also pertinent to this visit.    Code status:   Current Code Status       Date Active Code Status Order ID Comments User Context       6/26/2025 1832 CPR (Attempt to Resuscitate) [id number]  Vijay Valdez III, [place] ED        Question Answer    Code Status (Patient has no pulse and is not breathing) CPR (Attempt to Resuscitate)    Medical Interventions (Patient has pulse or is breathing) Full Support    Level Of Support Discussed With Patient                    Allergies:   Pomegranate [punica]    Isolation:   No active isolations    Intake and Output    Intake/Output Summary (Last 24 hours) at 6/26/2025 1837  Last data filed at 6/26/2025 1825  Gross per 24 hour   Intake 1000 ml   Output --   Net 1000 ml       Weight:       06/26/25  1545   Weight: 60.3 kg (133 lb)       Most recent vitals:   Vitals:    06/26/25 1509 06/26/25 1542 06/26/25 1545 06/26/25 1700   BP: 117/77 122/69     BP Location:  Left arm     Patient Position:  Lying     Pulse: 88 101  105   Resp: 20 18     Temp:  99.7 °F (37.6 °C)     TempSrc:  Oral     SpO2: 94% 100%  97%   Weight:   60.3 kg (133 lb)    Height:   165.1 cm (65\")        Active LDAs/IV Access:   Lines, Drains & Airways       Active LDAs       Name Placement date Placement time Site Days    Peripheral IV 06/26/25 1540 20 G Right Antecubital 06/26/25  1540  Antecubital  less than 1                    Labs (abnormal labs have a star):   Labs Reviewed   COMPREHENSIVE METABOLIC PANEL - Abnormal; Notable " for the following components:       Result Value    BUN 4.0 (*)     CO2 20.5 (*)     BUN/Creatinine Ratio 4.9 (*)     All other components within normal limits    Narrative:     GFR Categories in Chronic Kidney Disease (CKD)              GFR Category          GFR (mL/min/1.73)    Interpretation  G1                    90 or greater        Normal or high (1)  G2                    60-89                Mild decrease (1)  G3a                   45-59                Mild to moderate decrease  G3b                   30-44                Moderate to severe decrease  G4                    15-29                Severe decrease  G5                    14 or less           Kidney failure    (1)In the absence of evidence of kidney disease, neither GFR category G1 or G2 fulfill the criteria for CKD.    eGFR calculation 2021 CKD-EPI creatinine equation, which does not include race as a factor   URINALYSIS W/ MICROSCOPIC IF INDICATED (NO CULTURE) - Abnormal; Notable for the following components:    pH, UA 8.5 (*)     Ketones, UA 15 mg/dL (1+) (*)     All other components within normal limits    Narrative:     Urine microscopic not indicated.   CBC WITH AUTO DIFFERENTIAL - Abnormal; Notable for the following components:    WBC 12.27 (*)     Hemoglobin 11.5 (*)     MCH 25.7 (*)     MCHC 31.0 (*)     Neutrophil % 83.5 (*)     Lymphocyte % 9.9 (*)     Neutrophils, Absolute 10.24 (*)     All other components within normal limits   RESPIRATORY PANEL PCR W/ COVID-19 (SARS-COV-2), NP SWAB IN UTM/VTP, 2 HR TAT - Normal    Narrative:     In the setting of a positive respiratory panel with a viral infection PLUS a negative procalcitonin without other underlying concern for bacterial infection, consider observing off antibiotics or discontinuation of antibiotics and continue supportive care. If the respiratory panel is positive for atypical bacterial infection (Bordetella pertussis, Chlamydophila pneumoniae, or Mycoplasma pneumoniae), consider  antibiotic de-escalation to target atypical bacterial infection.   LIPASE - Normal   HCG, SERUM, QUALITATIVE - Normal   RAINBOW DRAW    Narrative:     The following orders were created for panel order [place] Draw.  Procedure                               Abnormality         Status                     ---------                               -----------         ------                     Green Top (Gel)[[id number]]                                  Final result               Lavender Top[[id number]]                                     Final result               Gold Top - SST[[id number]]                                                              Light Blue Top[[id number]]                                   Final result                 Please view results for these tests on the individual orders.   CBC AND DIFFERENTIAL    Narrative:     The following orders were created for panel order CBC & Differential.  Procedure                               Abnormality         Status                     ---------                               -----------         ------                     CBC Auto Differential[[id number]]        Abnormal            Final result                 Please view results for these tests on the individual orders.   GREEN TOP   LAVENDER TOP   LIGHT BLUE TOP       EKG:   No orders to display       Meds given in ED:   Medications   sodium chloride 0.9 % flush 10 mL (has no administration in time range)   lactated ringers bolus 1,000 mL (1,000 mL Intravenous New Bag 6/26/25 1829)   sodium chloride 0.9 % flush 10 mL (has no administration in time range)   sodium chloride 0.9 % flush 10 mL (has no administration in time range)   sodium chloride 0.9 % infusion 40 mL (has no administration in time range)   ondansetron ODT (ZOFRAN-ODT) disintegrating tablet 4 mg (has no administration in time range)     Or   ondansetron (ZOFRAN) injection 4 mg (has no administration in time range)   sennosides-docusate (PERICOLACE)  8.6-50 MG per tablet 2 tablet (has no administration in time range)     And   polyethylene glycol (MIRALAX) packet 17 g (has no administration in time range)     And   bisacodyl (DULCOLAX) EC tablet 5 mg (has no administration in time range)     And   bisacodyl (DULCOLAX) suppository 10 mg (has no administration in time range)   sodium chloride 0.9 % infusion (has no administration in time range)   pantoprazole (PROTONIX) injection 40 mg (has no administration in time range)   ondansetron (ZOFRAN) injection 4 mg (4 mg Intravenous Given 6/26/25 1607)   lactated ringers bolus 1,000 mL (0 mL Intravenous Stopped 6/26/25 1825)   ketorolac (TORADOL) injection 15 mg (15 mg Intravenous Given 6/26/25 1628)   iopamidol (ISOVUE-300) 61 % injection 100 mL (85 mL Intravenous Given by Other 6/26/25 1716)   famotidine (PEPCID) injection 20 mg (20 mg Intravenous Given 6/26/25 1829)   morphine injection 2 mg (2 mg Intravenous Given 6/26/25 1829)       Imaging results:  CT Abdomen Pelvis With Contrast  Result Date: 6/26/2025    1. No focal acute inflammatory process of bowel is identified. Mild nonspecific pelvic free fluid. Follow-up as indications persist.  2. No obstructive uropathy.  This report was finalized on 6/26/2025 5:34 PM by Dr. Josh Samuels M.D on Workstation: [email or web address]        Ambulatory status:   - ad clayton    Social issues:   Social History     Socioeconomic History    Marital status: Single   Tobacco Use    Smoking status: Never    Smokeless tobacco: Never   Vaping Use    Vaping status: Never Used   Substance and Sexual Activity    Alcohol use: Never    Drug use: Yes     Types: Marijuana     Comment: every other day    Sexual activity: Not Currently     Partners: Female     Birth control/protection: Condom, Patch       Peripheral Neurovascular  Peripheral Neurovascular (Adult)  Peripheral Neurovascular WDL: WDL    Neuro Cognitive  Neuro Cognitive (Adult)  Cognitive/Neuro/Behavioral WDL: WDL  Sedation Group  POSS  ([place]ero Opioid-Induced Sed Scale): 1 - Awake and alert    Learning  Learning Assessment  Learning Readiness and Ability: no barriers identified  Education Provided  Person Taught: patient, significant other/partner  Teaching Method: verbal instruction  Teaching Focus: symptom/problem overview, risk factors/triggers, self-management  Education Outcome Evaluation: acceptance expressed    Respiratory  Respiratory WDL  Respiratory WDL: .WDL except, rhythm/pattern  Rhythm/Pattern, Respiratory: shortness of breath    Abdominal Pain       Pain Assessments  Pain (Adult)  (0-10) Pain Rating: Rest: 10  (0-10) Pain Rating: Activity: 10  Pain Location: abdomen, flank  Pain Side/Orientation: right    NIH Stroke Scale       Pancho Marino RN  06/26/25 18:37 EDT

## 2025-06-26 NOTE — ED PROVIDER NOTES
EMERGENCY DEPARTMENT ENCOUNTER    Room Number:  130/1  PCP: Tricia Ruiz APRN  Historian: Patient    I initially evaluated the patient at 1606    HPI:  Chief Complaint: Right sided abdominal pain, fever  A complete HPI/ROS/PMH/PSH/SH/FH are unobtainable due to: Nothing  Context: Dariel Schilling is a 20 y.o. female with a medical history of asthma, cholecystectomy who presents to the ED c/o acute right sided abdominal pain and fever.  Symptoms began yesterday.  Patient has had constant burning in her right lower abdomen.  Nothing makes the pain better or worse.  She has had fever and chills.  Temperature was 102.4 at triage.  She has had sinus congestion, headache, body aches, nausea.  She has had several episodes of nonbilious nonbloody vomiting.  Denies cough, chest pain, shortness of breath, flank pain, diarrhea, or dysuria.  LMP was about 2 weeks ago.            PAST MEDICAL HISTORY  Active Ambulatory Problems     Diagnosis Date Noted    Chronic abdominal pain 05/29/2024    Seasonal allergies 05/29/2024    Nausea and vomiting 05/29/2024    Endometriosis 05/29/2024    Marijuana use 07/09/2024    Mild intermittent asthma without complication 07/09/2024    Gastroesophageal reflux disease 05/14/2025    History of cholecystectomy 05/14/2025    History of anemia 05/14/2025     Resolved Ambulatory Problems     Diagnosis Date Noted    Left-sided weakness 10/04/2023    Routine health maintenance 05/08/2024    Chronic cough 05/29/2024    Chronic chest pain 05/29/2024    Recurrent infections 07/09/2024    Intractable abdominal pain 10/19/2024    SIRS (systemic inflammatory response syndrome) 10/20/2024    Biliary dyskinesia 10/19/2024     Past Medical History:   Diagnosis Date    Allergic     Anemia     Anxiety     Asthma     Chlamydia     Eczema     Visual impairment          PAST SURGICAL HISTORY  Past Surgical History:   Procedure Laterality Date    CHOLECYSTECTOMY WITH INTRAOPERATIVE CHOLANGIOGRAM N/A 10/23/2024     Procedure: CHOLECYSTECTOMY LAPAROSCOPIC INTRAOPERATIVE CHOLANGIOGRAM;  Surgeon: Lala Acosta MD;  Location: [facility] MAIN OR;  Service: General;  Laterality: N/A;    COLONOSCOPY      ENDOSCOPY N/A 10/21/2024    Procedure: ESOPHAGOGASTRODUODENOSCOPY with cold biopsies;  Surgeon: Viktoriya Parmar MD;  Location: [facility] ENDOSCOPY;  Service: Gastroenterology;  Laterality: N/A;  pre: nausea, vomitting  post: erosive gastritis    WISDOM TOOTH EXTRACTION           FAMILY HISTORY  Family History   Problem Relation Age of Onset    Asthma Mother     Hyperlipidemia Mother     Asthma Father     Arthritis Maternal Grandmother     Cancer Maternal Grandfather     Cancer Paternal Grandmother     Hyperlipidemia Paternal Grandmother     Vision loss Paternal Grandmother     Hyperlipidemia Paternal Grandfather     Cancer Maternal Great-Grandmother     Breast cancer Maternal Great-Grandmother          SOCIAL HISTORY  Social History     Socioeconomic History    Marital status: Single   Tobacco Use    Smoking status: Never    Smokeless tobacco: Never   Vaping Use    Vaping status: Never Used   Substance and Sexual Activity    Alcohol use: Never    Drug use: Yes     Types: Marijuana     Comment: every other day    Sexual activity: Not Currently     Partners: Female     Birth control/protection: Condom, Patch         ALLERGIES  Pomegranate [punica]    REVIEW OF SYSTEMS  Review of Systems  Included in HPI  All systems reviewed and negative except for those discussed in HPI.      PHYSICAL EXAM  ED Triage Vitals   Temp Heart Rate Resp BP SpO2   06/26/25 1506 06/26/25 1509 06/26/25 1509 06/26/25 1509 06/26/25 1509   (!) 102.4 °F (39.1 °C) 88 20 117/77 94 %      Temp src Heart Rate Source Patient Position BP Location FiO2 (%)   06/26/25 1506 06/26/25 1542 06/26/25 1542 06/26/25 1542 --   Oral Monitor Lying Left arm        Physical Exam      GENERAL: Awake, alert, oriented x 3.  Well-developed, well-nourished and nontoxic-appearing female.     HENT: NCAT, nares patent, dry mucous membranes, oropharynx is otherwise benign, there is tenderness over the frontal sinuses  EYES: no scleral icterus, no photophobia  CV: regular rhythm, mildly tachycardic  RESPIRATORY: normal effort, clear to auscultation bilaterally  ABDOMEN: soft, there is right lower quadrant tenderness without rebound or guarding, no CVA tenderness  MUSCULOSKELETAL: Extremities are nontender with full range of motion.  Neck is supple.  No meningismus.  NEURO: Speech is normal.  No facial droop.  PSYCH:  calm, cooperative  SKIN: warm, dry    Vital signs and nursing notes reviewed.          LAB RESULTS  Recent Results (from the past 24 hours)   Comprehensive Metabolic Panel    Collection Time: 06/26/25  3:17 PM    Specimen: Arm, Right; Blood   Result Value Ref Range    Glucose 92 65 - 99 mg/dL    BUN 4.0 (L) 6.0 - 20.0 mg/dL    Creatinine 0.81 0.57 - 1.00 mg/dL    Sodium 136 136 - 145 mmol/L    Potassium 3.5 3.5 - 5.2 mmol/L    Chloride 103 98 - 107 mmol/L    CO2 20.5 (L) 22.0 - 29.0 mmol/L    Calcium 9.3 8.6 - 10.5 mg/dL    Total Protein 7.7 6.0 - 8.5 g/dL    Albumin 4.1 3.5 - 5.2 g/dL    ALT (SGPT) 6 1 - 33 U/L    AST (SGOT) 10 1 - 32 U/L    Alkaline Phosphatase 66 39 - 117 U/L    Total Bilirubin 0.3 0.0 - 1.2 mg/dL    Globulin 3.6 gm/dL    A/G Ratio 1.1 g/dL    BUN/Creatinine Ratio 4.9 (L) 7.0 - 25.0    Anion Gap 12.5 5.0 - 15.0 mmol/L    eGFR 106.1 >60.0 mL/min/1.73   Lipase    Collection Time: 06/26/25  3:17 PM    Specimen: Arm, Right; Blood   Result Value Ref Range    Lipase 14 13 - 60 U/L   hCG, Serum, Qualitative    Collection Time: 06/26/25  3:17 PM    Specimen: Arm, Right; Blood   Result Value Ref Range    HCG Qualitative Negative Negative   Green Top (Gel)    Collection Time: 06/26/25  3:17 PM   Result Value Ref Range    Extra Tube Hold for add-ons.    Lavender Top    Collection Time: 06/26/25  3:17 PM   Result Value Ref Range    Extra Tube hold for add-on    Light Blue Top     Collection Time: 06/26/25  3:17 PM   Result Value Ref Range    Extra Tube Hold for add-ons.    CBC Auto Differential    Collection Time: 06/26/25  3:17 PM    Specimen: Arm, Right; Blood   Result Value Ref Range    WBC 12.27 (H) 3.40 - 10.80 10*3/mm3    RBC 4.47 3.77 - 5.28 10*6/mm3    Hemoglobin 11.5 (L) 12.0 - 15.9 g/dL    Hematocrit 37.1 34.0 - 46.6 %    MCV 83.0 79.0 - 97.0 fL    MCH 25.7 (L) 26.6 - 33.0 pg    MCHC 31.0 (L) 31.5 - 35.7 g/dL    RDW 15.3 12.3 - 15.4 %    RDW-SD 45.9 37.0 - 54.0 fl    MPV 10.0 6.0 - 12.0 fL    Platelets 302 140 - 450 10*3/mm3    Neutrophil % 83.5 (H) 42.7 - 76.0 %    Lymphocyte % 9.9 (L) 19.6 - 45.3 %    Monocyte % 5.3 5.0 - 12.0 %    Eosinophil % 0.7 0.3 - 6.2 %    Basophil % 0.2 0.0 - 1.5 %    Immature Grans % 0.4 0.0 - 0.5 %    Neutrophils, Absolute 10.24 (H) 1.70 - 7.00 10*3/mm3    Lymphocytes, Absolute 1.22 0.70 - 3.10 10*3/mm3    Monocytes, Absolute 0.65 0.10 - 0.90 10*3/mm3    Eosinophils, Absolute 0.08 0.00 - 0.40 10*3/mm3    Basophils, Absolute 0.03 0.00 - 0.20 10*3/mm3    Immature Grans, Absolute 0.05 0.00 - 0.05 10*3/mm3    nRBC 0.0 0.0 - 0.2 /100 WBC   Urinalysis With Microscopic If Indicated (No Culture) - Urine, Clean Catch    Collection Time: 06/26/25  4:09 PM    Specimen: Urine, Clean Catch   Result Value Ref Range    Color, UA Yellow Yellow, Straw    Appearance, UA Clear Clear    pH, UA 8.5 (H) 5.0 - 8.0    Specific Gravity, UA 1.013 1.005 - 1.030    Glucose, UA Negative Negative    Ketones, UA 15 mg/dL (1+) (A) Negative    Bilirubin, UA Negative Negative    Blood, UA Negative Negative    Protein, UA Negative Negative    Leuk Esterase, UA Negative Negative    Nitrite, UA Negative Negative    Urobilinogen, UA 0.2 E.U./dL 0.2 - 1.0 E.U./dL   Respiratory Panel PCR w/COVID-19(SARS-CoV-2) [facility] In-House, NP Swab in [facility], 2 HR TAT - Swab, Nasopharynx    Collection Time: 06/26/25  4:41 PM    Specimen: Nasopharynx; Swab   Result Value Ref Range     ADENOVIRUS, PCR Not Detected Not Detected    Coronavirus 229E Not Detected Not Detected    Coronavirus HKU1 Not Detected Not Detected    Coronavirus NL63 Not Detected Not Detected    Coronavirus OC43 Not Detected Not Detected    COVID19 Not Detected Not Detected - Ref. Range    Human Metapneumovirus Not Detected Not Detected    Human Rhinovirus/Enterovirus Not Detected Not Detected    Influenza A PCR Not Detected Not Detected    Influenza B PCR Not Detected Not Detected    Parainfluenza Virus 1 Not Detected Not Detected    Parainfluenza Virus 2 Not Detected Not Detected    Parainfluenza Virus 3 Not Detected Not Detected    Parainfluenza Virus 4 Not Detected Not Detected    RSV, PCR Not Detected Not Detected    Bordetella pertussis pcr Not Detected Not Detected    Bordetella parapertussis PCR Not Detected Not Detected    Chlamydophila pneumoniae PCR Not Detected Not Detected    Mycoplasma pneumo by PCR Not Detected Not Detected   Lactic Acid, Plasma    Collection Time: 06/26/25  9:17 PM    Specimen: Arm, Left; Blood   Result Value Ref Range    Lactate 3.1 (C) 0.5 - 2.0 mmol/L       Ordered the above labs and reviewed the results.        RADIOLOGY  CT Abdomen Pelvis With Contrast  Result Date: 6/26/2025  CT ABDOMEN PELVIS W CONTRAST-  INDICATIONS: Right lower quadrant pain  TECHNIQUE: Radiation dose reduction techniques were utilized, including automated exposure control and exposure modulation based on body size. Enhanced ABDOMEN AND PELVIS CT  COMPARISON: 9/27/2024  FINDINGS:  The gallbladder is surgically absent. There are low densities are seen that are too small to characterize, appear similar to prior exam.  Otherwise unremarkable appearance of the liver, spleen, adrenal glands, pancreas, kidneys, bladder. The uterus is retroflexed, retroverted.  No bowel obstruction or abnormal bowel thickening is identified. The appendix does not appear inflamed.  No free intraperitoneal gas. Mild pelvic free fluid.  Scattered  small mesenteric and para-aortic lymph nodes are seen that are not significant by size criteria.  Abdominal aorta is not aneurysmal.    The lung bases are clear.  No acute fracture is identified.          1. No focal acute inflammatory process of bowel is identified. Mild nonspecific pelvic free fluid. Follow-up as indications persist.  2. No obstructive uropathy.  This report was finalized on 6/26/2025 5:34 PM by Dr. Josh Samuels M.D on Workstation: [id number]        Ordered the above noted radiological studies. Reviewed by me in PACS.            PROCEDURES  Procedures        OUTPATIENT MEDICATION MANAGEMENT:  Current Facility-Administered Medications Ordered in Epic   Medication Dose Route Frequency Provider Last Rate Last Admin    sennosides-docusate (PERICOLACE) 8.6-50 MG per tablet 2 tablet  2 tablet Oral BID PRN Vijay Valdez III, PA        And    polyethylene glycol (MIRALAX) packet 17 g  17 g Oral Daily PRN Vijay Valdez III, PA        And    bisacodyl (DULCOLAX) EC tablet 5 mg  5 mg Oral Daily PRN Vijay Valdez III, PA        And    bisacodyl (DULCOLAX) suppository 10 mg  10 mg Rectal Daily PRN Vijay Valdez III, PA        ondansetron ODT (ZOFRAN-ODT) disintegrating tablet 4 mg  4 mg Oral Q6H PRN Vijay Valdez III, PA        Or    ondansetron (ZOFRAN) injection 4 mg  4 mg Intravenous Q6H PRN Vijay Valdez III, PA        pantoprazole (PROTONIX) injection 40 mg  40 mg Intravenous Q12H Vijay Valdez III, PA   40 mg at 06/26/25 2010    sodium chloride 0.9 % flush 10 mL  10 mL Intravenous PRN Felix Izquierdo MD        sodium chloride 0.9 % flush 10 mL  10 mL Intravenous Q12H Vijay Valdez III, PA   10 mL at 06/26/25 2013    sodium chloride 0.9 % flush 10 mL  10 mL Intravenous PRN Vijay Valdez III, PA        sodium chloride 0.9 % infusion 40 mL  40 mL Intravenous PRN Vijay Valdez III, PA        sodium  chloride 0.9 % infusion  125 mL/hr Intravenous Continuous Amie Monroe APRN        sucralfate (CARAFATE) tablet 1 g  1 g Oral 4x Daily AC & at Bedtime Amie Monroe APRN   1 g at 06/26/25 2059     No current [facility]-ordered outpatient medications on file.           MEDICATIONS GIVEN IN ER  Medications   sodium chloride 0.9 % flush 10 mL (has no administration in time range)   sodium chloride 0.9 % flush 10 mL (10 mL Intravenous Given 6/26/25 2013)   sodium chloride 0.9 % flush 10 mL (has no administration in time range)   sodium chloride 0.9 % infusion 40 mL (has no administration in time range)   ondansetron ODT (ZOFRAN-ODT) disintegrating tablet 4 mg (has no administration in time range)     Or   ondansetron (ZOFRAN) injection 4 mg (has no administration in time range)   sennosides-docusate (PERICOLACE) 8.6-50 MG per tablet 2 tablet (has no administration in time range)     And   polyethylene glycol (MIRALAX) packet 17 g (has no administration in time range)     And   bisacodyl (DULCOLAX) EC tablet 5 mg (has no administration in time range)     And   bisacodyl (DULCOLAX) suppository 10 mg (has no administration in time range)   pantoprazole (PROTONIX) injection 40 mg (40 mg Intravenous Given 6/26/25 2010)   sucralfate (CARAFATE) tablet 1 g (1 g Oral Given 6/26/25 2059)   sodium chloride 0.9 % infusion (has no administration in time range)   ondansetron (ZOFRAN) injection 4 mg (4 mg Intravenous Given 6/26/25 1607)   lactated ringers bolus 1,000 mL (0 mL Intravenous Stopped 6/26/25 1825)   ketorolac (TORADOL) injection 15 mg (15 mg Intravenous Given 6/26/25 1628)   iopamidol (ISOVUE-300) 61 % injection 100 mL (85 mL Intravenous Given by Other 6/26/25 1716)   lactated ringers bolus 1,000 mL (0 mL Intravenous Stopped 6/26/25 2013)   famotidine (PEPCID) injection 20 mg (20 mg Intravenous Given 6/26/25 1829)   morphine injection 2 mg (2 mg Intravenous Given 6/26/25 1829)   aluminum-magnesium hydroxide-simethicone (MAALOX  MAX) 400-400-40 MG/5ML suspension 15 mL (15 mL Oral Given 6/26/25 2052)   Lidocaine Viscous HCl (XYLOCAINE) 2 % solution 5 mL (5 mL Mouth/Throat Given 6/26/25 2053)   morphine injection 4 mg (4 mg Intravenous Given 6/26/25 2059)   metoclopramide (REGLAN) injection 10 mg (10 mg Intravenous Given 6/26/25 2054)   diphenhydrAMINE (BENADRYL) injection 25 mg (25 mg Intravenous Given 6/26/25 2056)                   MEDICAL DECISION MAKING, PROGRESS, and CONSULTS    All labs have been independently reviewed by me.  All radiology studies have been reviewed by me and I have also reviewed the radiology report.   EKG's independently viewed and interpreted by me.  Discussion below represents my analysis of pertinent findings related to patient's condition, differential diagnosis, treatment plan and final disposition.      Additional sources:    - Discussed/ obtained information from independent historians: None    - External (non-ED) record review: Patient was last admitted here in October 2024 for abdominal pain.  She was seen by GI and general surgery.  She had a laparoscopic cholecystectomy.  EGD showed erosive gastritis.    -Prescription drug monitoring program review:     EM_[place] : N/A    - Chronic or social conditions impacting patient care (Social Determinants of Health): None          Orders placed during this visit:  Orders Placed This Encounter   Procedures    Respiratory Panel PCR w/COVID-19(SARS-CoV-2) [facility] In-House, NP Swab in UTM/VTM, 2 HR TAT - Swab, Nasopharynx    Blood Culture - Blood,    Blood Culture - Blood,    Gastrointestinal Panel, PCR - Stool, Per Rectum    CT Abdomen Pelvis With Contrast    [place] Draw    Comprehensive Metabolic Panel    Lipase    Urinalysis With Microscopic If Indicated (No Culture) - Urine, Clean Catch    hCG, Serum, Qualitative    CBC Auto Differential    Comprehensive Metabolic Panel    Lipase    Lactic Acid, Plasma    STAT Lactic Acid, Reflex    CBC Auto  Differential    Diet: Liquid; Clear Liquid; Fluid Consistency: Thin (IDDSI 0)    NPO Diet NPO Type: Strict NPO    Undress & Gown    Vital Signs    Up With Assistance    Intake & Output    Weigh Patient    Oral Care    Saline Lock & Maintain IV Access    Place Sequential Compression Device    Maintain Sequential Compression Device    Code Status and Medical Interventions: CPR (Attempt to Resuscitate); Full Support    Inpatient Gastroenterology Consult    Insert Peripheral IV    Insert Peripheral IV    Initiate Emergency Department Observation Status    CBC & Differential    Green Top (Gel)    Lavender Top    Light Blue Top    CBC & Differential         Additional orders considered but not ordered:          Differential diagnosis includes, but is not limited to:    Differential diagnosis includes but is not limited to:  - hepatobiliary pathology such as cholecystitis, cholangitis, and symptomatic cholelithiasis  - Pancreatitis  - Dyspepsia  - Small bowel obstruction  - Appendicitis  - Diverticulitis  - UTI including pyelonephritis  - Ureteral stone  - Zoster  - Colitis, including infectious and ischemic  - Atypical ACS        Independent interpretation of labs, radiology studies, and discussions with consultants:  ED Course as of 06/26/25 2254   Thu Jun 26, 2025   1545 Temp: 99.7 °F (37.6 °C) [WH]   1605 BP: 117/77 [WH]   1605 Temp(!): 102.4 °F (39.1 °C) [WH]   1605 Heart Rate: 88 [WH]   1605 Resp: 20 [WH]   1605 SpO2: 94 %  Room air [WH]   1611 WBC(!): 12.27 [WH]   1611 Hemoglobin(!): 11.5 [WH]   1611 Neutrophil Rel %(!): 83.5 [WH]   1611 Glucose: 92 [WH]   1611 BUN(!): 4.0 [WH]   1611 Creatinine: 0.81 [WH]   1611 CO2(!): 20.5 [WH]   1611 Anion Gap: 12.5 [WH]   1611 HCG Qualitative: Negative [WH]   1611 Lipase: 14 [WH]   1702 Leukocytes, UA: Negative [WH]   1702 Nitrite, UA: Negative [WH]   1750 Respiratory panel is negative [WH]   1750 CT abdomen/pelvis personally interpreted by me.  My personal interpretation is:  No bowel obstruction.  No obstructive uropathy.  Per the radiologist, there is mild free fluid in the pelvis but CT is otherwise negative acute. [WH]   1808 Patient still complains of abdominal pain and nausea.  She is still mildly tachycardic.  On reexam, there is epigastric tenderness without rebound or guarding.  Test results and diagnoses were discussed with her and her family.  Shared decision making was discussed and admission was recommended.  She is agreeable with that. [WH]   1811 Discussed with VINCE Tomlin, and he agrees to admit the patient to Dr. Morales.  History, exam findings, test results, ED course, and diagnoses were discussed with him. [WH]   1820 MDM: Patient presented to the ED with acute right lower quadrant pain, fever, vomiting, and bodyaches.  CT ab/pelvis was negative acute.  Respiratory panel was negative.  White blood cell count was mildly elevated.  Patient did not have a UTI.  She had some improvement with IV fluids and IV medications but continued to complain of pain and nausea.  She was mildly dehydrated and will be admitted to the hospitalist. [WH]      ED Course User Index  [WH] Felix Izquierdo MD         COMPLEXITY OF CARE  The patient requires admission.      DIAGNOSIS  Final diagnoses:   Acute right lower quadrant pain   Acute febrile illness   Nausea and vomiting, unspecified vomiting type         DISPOSITION  ADMISSION    Discussed treatment plan and reason for admission with pt/family and admitting physician.  Pt/family voiced understanding of the plan for admission for further testing/treatment as needed.               Latest Documented Vital Signs:  AS OF 22:54 EDT VITALS:    BP - 117/69  HR - 80  TEMP - 99 °F (37.2 °C) (Oral)  O2 SATS - 100%            --    Please note that portions of this were completed with a voice recognition program.       Note Disclaimer: At [facility], we believe that sharing information builds trust and better relationships. You are  receiving this note because you are receiving care at [facility] or recently visited. It is possible you will see health information before a provider has talked with you about it. This kind of information can be easy to misunderstand. To help you fully understand what it means for your health, we urge you to discuss this note with your provider.             Felix Izquierdo MD  06/26/25 2972

## 2025-06-26 NOTE — H&P
[facility]   HISTORY AND PHYSICAL    Patient Name: Dariel Schilling  : 2005  MRN: [id number]  Primary Care Physician:  Tricia Ruiz APRN  Date of admission: 2025    Subjective   Subjective     Chief Complaint:   Chief Complaint   Patient presents with    Fever    Abdominal Pain         HPI:    Dariel Schilling is a 20 y.o. female with past medical history of asthma, cholecystectomy who presented to the ED acute right sided abdominal pain and fever.  Symptoms are said to begin yesterday.  She states she has had a constant burning in her abdomen.  There has been associated nausea and vomiting.  No diarrhea or abnormal bowel movements.  Temperature was 102.4 the ED.  She reports she did have sinus congestion, headache, body aches along with her abdominal pain.  No chest pain or shortness of breath or UTI-like symptoms.  She reports her LMP was 2 weeks ago.    In the ED her chemistry showed normal electrolytes, creatinine was 0.81.  hCG was negative.  WBC was 12.27.  UA was negative for infectious process.    Respiratory panel was negative.  CT scan of the abdomen pelvis showed no acute process.    Patient was positive for THC and opiates on 2025.  He has had 3 visits to the ED for abdominal pain, nausea, vomiting since week in May 2025.    Review of Systems   All systems were reviewed and negative except for: That listed above.    Personal History     Past Medical History:   Diagnosis Date    Allergic     Anemia     iron deficiency    Anxiety     Asthma     Biliary dyskinesia 10/19/2024    Chlamydia     Chronic chest pain 2024    Chronic cough 2024    Eczema     Intractable abdominal pain 10/19/2024    Left-sided weakness 10/04/2023    Recurrent infections 2024    Routine health maintenance 2024    Seasonal allergies     SIRS (systemic inflammatory response syndrome) 10/20/2024    Visual impairment        Past Surgical History:   Procedure Laterality Date     CHOLECYSTECTOMY WITH INTRAOPERATIVE CHOLANGIOGRAM N/A 10/23/2024    Procedure: CHOLECYSTECTOMY LAPAROSCOPIC INTRAOPERATIVE CHOLANGIOGRAM;  Surgeon: Lala Acosta MD;  Location: [facility] MAIN OR;  Service: General;  Laterality: N/A;    COLONOSCOPY      ENDOSCOPY N/A 10/21/2024    Procedure: ESOPHAGOGASTRODUODENOSCOPY with cold biopsies;  Surgeon: Viktoriya Parmar MD;  Location: [facility] ENDOSCOPY;  Service: Gastroenterology;  Laterality: N/A;  pre: nausea, vomitting  post: erosive gastritis    WISDOM TOOTH EXTRACTION         Family History: family history includes Arthritis in her maternal grandmother; Asthma in her father and mother; Breast cancer in her maternal great-grandmother; Cancer in her maternal grandfather, maternal great-grandmother, and paternal grandmother; Hyperlipidemia in her mother, paternal grandfather, and paternal grandmother; Vision loss in her paternal grandmother. Otherwise pertinent FHx was reviewed and not pertinent to current issue.    Social History:  reports that she has never smoked. She has never used smokeless tobacco. She reports current drug use. Drug: Marijuana. She reports that she does not drink alcohol.    Home Medications:  Sodium Fluoride, albuterol sulfate HFA, cetirizine, dicyclomine, norelgestromin-ethinyl estradiol, ondansetron ODT, and pantoprazole    Allergies:  Allergies   Allergen Reactions    Pomegranate [Punica] Swelling       Objective   Objective     Vitals:   Temp:  [99.7 °F (37.6 °C)-102.4 °F (39.1 °C)] 99.7 °F (37.6 °C)  Heart Rate:  [] 78  Resp:  [18-20] 18  BP: (108-122)/(69-77) 108/70  Flow (L/min) (Oxygen Therapy):  [2] 2  Physical Exam    Constitutional: Awake, alert   Eyes: PERRLA, sclerae anicteric, no conjunctival injection   HENT: NCAT, mucous membranes moist   Neck: Supple, no thyromegaly, no lymphadenopathy, trachea midline   Respiratory: Clear to auscultation bilaterally, nonlabored respirations    Cardiovascular: RRR, no murmurs, rubs, or  gallops, palpable pedal pulses bilaterally   Gastrointestinal: Tenderness upper abdomen, no guarding or mass noted.   Musculoskeletal: No bilateral ankle edema, no clubbing or cyanosis to extremities   Psychiatric: Appropriate affect, cooperative   Neurologic: Oriented x 3, strength symmetric in all extremities, Cranial Nerves grossly intact to confrontation, speech clear   Skin: No rashes     Result Review    Result Review:  I have personally reviewed the results from the time of this admission to 6/26/2025 18:44 EDT and agree with these findings:  [x]  Laboratory list / accordion  [x]  Microbiology  [x]  Radiology  []  EKG/Telemetry   []  Cardiology/Vascular   []  Pathology  []  Old records  []  Other:        Assessment & Plan   The ASCVD Risk score (Jorge ALVARADO, et al., 2019) failed to calculate for the following reasons:    The 2019 ASCVD risk score is only valid for ages 40 to 79    Assessment / Plan     Brief Patient Summary:  Dariel Schilling is a 20 y.o. female who was admitted to the ED observation unit for upper abdominal pain, fever to greater than 102, multiple bouts of nonbloody/nonbilious vomiting.  CT abdomen pelvis with IV contrast was negative.  UA negative.  Respiratory viral panel negative.    Active Hospital Problems:  Active Hospital Problems    Diagnosis     **Abdominal pain      Plan:     Upper abdominal pain  Nausea vomiting  Fever  -IV fluids  - Zofran for nausea vomiting  - Morphine 4 mg every 6 hours as needed for pain  - IV Protonix 2 mg every 12 hours  - Trend CBC and CMP  - Tylenol for fever  - GI consultation  -CLD, n.p.o. midnight-in case EGD needed  - If diarrhea develops, stool studies    Asthma  - Albuterol nebs every 6 as needed        VTE Prophylaxis:  Mechanical VTE prophylaxis orders are present.        CODE STATUS:    Code Status (Patient has no pulse and is not breathing): CPR (Attempt to Resuscitate)  Medical Interventions (Patient has pulse or is breathing): Full  Support  Level Of Support Discussed With: Patient    Admission Status:  I believe this patient meets observation status.    Electronically signed by Vijay Valdez III, PA, 06/26/25, 6:35 PM EDT.        75 minutes has been spent by [facility] providers in the care of this patient while under observation status on this date 06/26/25        I have worn appropriate PPE during this patient encounter, sanitized my hands both with entering and exiting patient's room.    I have discussed plan of care with patient including advance care plan and/or surrogate decision maker.  Patient advises that their mother/Karen Schilling will be their primary surrogate decision maker

## 2025-06-27 ENCOUNTER — READMISSION MANAGEMENT (OUTPATIENT)
Dept: CALL CENTER | Facility: HOSPITAL | Age: 20
End: 2025-06-27
Payer: MEDICAID

## 2025-06-27 VITALS
HEART RATE: 93 BPM | SYSTOLIC BLOOD PRESSURE: 111 MMHG | BODY MASS INDEX: 22.59 KG/M2 | RESPIRATION RATE: 19 BRPM | WEIGHT: 135.6 LBS | TEMPERATURE: 98.8 F | DIASTOLIC BLOOD PRESSURE: 65 MMHG | OXYGEN SATURATION: 98 % | HEIGHT: 65 IN

## 2025-06-27 LAB
ALBUMIN SERPL-MCNC: 3.8 G/DL (ref 3.5–5.2)
ALBUMIN/GLOB SERPL: 1.2 G/DL
ALP SERPL-CCNC: 77 U/L (ref 39–117)
ALT SERPL W P-5'-P-CCNC: 17 U/L (ref 1–33)
ANION GAP SERPL CALCULATED.3IONS-SCNC: 13.2 MMOL/L (ref 5–15)
AST SERPL-CCNC: 28 U/L (ref 1–32)
BASOPHILS # BLD AUTO: 0.03 10*3/MM3 (ref 0–0.2)
BASOPHILS NFR BLD AUTO: 0.2 % (ref 0–1.5)
BILIRUB SERPL-MCNC: 0.3 MG/DL (ref 0–1.2)
BUN SERPL-MCNC: 3 MG/DL (ref 6–20)
BUN/CREAT SERPL: 4 (ref 7–25)
CALCIUM SPEC-SCNC: 8.7 MG/DL (ref 8.6–10.5)
CHLORIDE SERPL-SCNC: 106 MMOL/L (ref 98–107)
CO2 SERPL-SCNC: 20.8 MMOL/L (ref 22–29)
CREAT SERPL-MCNC: 0.75 MG/DL (ref 0.57–1)
D-LACTATE SERPL-SCNC: 1.6 MMOL/L (ref 0.5–2)
DEPRECATED RDW RBC AUTO: 46.1 FL (ref 37–54)
EGFRCR SERPLBLD CKD-EPI 2021: 116.3 ML/MIN/1.73
EOSINOPHIL # BLD AUTO: 0.01 10*3/MM3 (ref 0–0.4)
EOSINOPHIL NFR BLD AUTO: 0.1 % (ref 0.3–6.2)
ERYTHROCYTE [DISTWIDTH] IN BLOOD BY AUTOMATED COUNT: 15.4 % (ref 12.3–15.4)
GLOBULIN UR ELPH-MCNC: 3.1 GM/DL
GLUCOSE SERPL-MCNC: 122 MG/DL (ref 65–99)
HCT VFR BLD AUTO: 33.4 % (ref 34–46.6)
HGB BLD-MCNC: 10.7 G/DL (ref 12–15.9)
IMM GRANULOCYTES # BLD AUTO: 0.04 10*3/MM3 (ref 0–0.05)
IMM GRANULOCYTES NFR BLD AUTO: 0.3 % (ref 0–0.5)
LIPASE SERPL-CCNC: 13 U/L (ref 13–60)
LYMPHOCYTES # BLD AUTO: 1.09 10*3/MM3 (ref 0.7–3.1)
LYMPHOCYTES NFR BLD AUTO: 8.6 % (ref 19.6–45.3)
MCH RBC QN AUTO: 26.3 PG (ref 26.6–33)
MCHC RBC AUTO-ENTMCNC: 32 G/DL (ref 31.5–35.7)
MCV RBC AUTO: 82.1 FL (ref 79–97)
MONOCYTES # BLD AUTO: 0.87 10*3/MM3 (ref 0.1–0.9)
MONOCYTES NFR BLD AUTO: 6.9 % (ref 5–12)
MRSA DNA SPEC QL NAA+PROBE: NORMAL
NEUTROPHILS NFR BLD AUTO: 10.61 10*3/MM3 (ref 1.7–7)
NEUTROPHILS NFR BLD AUTO: 83.9 % (ref 42.7–76)
NRBC BLD AUTO-RTO: 0 /100 WBC (ref 0–0.2)
PLATELET # BLD AUTO: 287 10*3/MM3 (ref 140–450)
PMV BLD AUTO: 10.2 FL (ref 6–12)
POTASSIUM SERPL-SCNC: 3.6 MMOL/L (ref 3.5–5.2)
PROT SERPL-MCNC: 6.9 G/DL (ref 6–8.5)
RBC # BLD AUTO: 4.07 10*6/MM3 (ref 3.77–5.28)
SODIUM SERPL-SCNC: 140 MMOL/L (ref 136–145)
WBC NRBC COR # BLD AUTO: 12.65 10*3/MM3 (ref 3.4–10.8)

## 2025-06-27 PROCEDURE — 96376 TX/PRO/DX INJ SAME DRUG ADON: CPT

## 2025-06-27 PROCEDURE — 83605 ASSAY OF LACTIC ACID: CPT

## 2025-06-27 PROCEDURE — 25010000002 ONDANSETRON PER 1 MG: Performed by: PHYSICIAN ASSISTANT

## 2025-06-27 PROCEDURE — 85025 COMPLETE CBC W/AUTO DIFF WBC: CPT

## 2025-06-27 PROCEDURE — 25010000002 VANCOMYCIN HCL 1.25 G RECONSTITUTED SOLUTION 1 EACH VIAL

## 2025-06-27 PROCEDURE — 25810000003 SODIUM CHLORIDE 0.9 % SOLUTION 250 ML FLEX CONT

## 2025-06-27 PROCEDURE — 87641 MR-STAPH DNA AMP PROBE: CPT

## 2025-06-27 PROCEDURE — G0378 HOSPITAL OBSERVATION PER HR: HCPCS

## 2025-06-27 PROCEDURE — 25010000002 CEFEPIME PER 500 MG

## 2025-06-27 PROCEDURE — 83690 ASSAY OF LIPASE: CPT | Performed by: PHYSICIAN ASSISTANT

## 2025-06-27 PROCEDURE — 80053 COMPREHEN METABOLIC PANEL: CPT | Performed by: PHYSICIAN ASSISTANT

## 2025-06-27 PROCEDURE — 87040 BLOOD CULTURE FOR BACTERIA: CPT

## 2025-06-27 PROCEDURE — 25810000003 SODIUM CHLORIDE 0.9 % SOLUTION

## 2025-06-27 PROCEDURE — 96361 HYDRATE IV INFUSION ADD-ON: CPT

## 2025-06-27 RX ORDER — ECHINACEA PURPUREA EXTRACT 125 MG
2 TABLET ORAL
Status: DISCONTINUED | OUTPATIENT
Start: 2025-06-27 | End: 2025-06-27 | Stop reason: HOSPADM

## 2025-06-27 RX ORDER — BUTALBITAL, ACETAMINOPHEN AND CAFFEINE 50; 325; 40 MG/1; MG/1; MG/1
1 TABLET ORAL ONCE
Status: COMPLETED | OUTPATIENT
Start: 2025-06-27 | End: 2025-06-27

## 2025-06-27 RX ORDER — GUAIFENESIN 600 MG/1
1200 TABLET, EXTENDED RELEASE ORAL EVERY 12 HOURS SCHEDULED
Qty: 40 TABLET | Refills: 0 | Status: SHIPPED | OUTPATIENT
Start: 2025-06-27 | End: 2025-07-07

## 2025-06-27 RX ORDER — FLUTICASONE PROPIONATE 50 MCG
2 SPRAY, SUSPENSION (ML) NASAL DAILY
Status: DISCONTINUED | OUTPATIENT
Start: 2025-06-27 | End: 2025-06-27 | Stop reason: HOSPADM

## 2025-06-27 RX ORDER — ECHINACEA PURPUREA EXTRACT 125 MG
2 TABLET ORAL
Qty: 480 ML | Refills: 0 | Status: SHIPPED | OUTPATIENT
Start: 2025-06-27

## 2025-06-27 RX ORDER — GUAIFENESIN 600 MG/1
1200 TABLET, EXTENDED RELEASE ORAL EVERY 12 HOURS SCHEDULED
Status: DISCONTINUED | OUTPATIENT
Start: 2025-06-27 | End: 2025-06-27 | Stop reason: HOSPADM

## 2025-06-27 RX ORDER — FLUTICASONE PROPIONATE 50 MCG
2 SPRAY, SUSPENSION (ML) NASAL DAILY
Qty: 16 G | Refills: 0 | Status: SHIPPED | OUTPATIENT
Start: 2025-06-27

## 2025-06-27 RX ORDER — ACETAMINOPHEN 500 MG
1000 TABLET ORAL EVERY 8 HOURS PRN
Status: DISCONTINUED | OUTPATIENT
Start: 2025-06-27 | End: 2025-06-27 | Stop reason: HOSPADM

## 2025-06-27 RX ADMIN — SODIUM CHLORIDE 125 ML/HR: 9 INJECTION, SOLUTION INTRAVENOUS at 05:17

## 2025-06-27 RX ADMIN — Medication 10 ML: at 09:52

## 2025-06-27 RX ADMIN — ONDANSETRON 4 MG: 2 INJECTION, SOLUTION INTRAMUSCULAR; INTRAVENOUS at 05:17

## 2025-06-27 RX ADMIN — ACETAMINOPHEN 1000 MG: 500 TABLET, FILM COATED ORAL at 13:33

## 2025-06-27 RX ADMIN — SUCRALFATE 1 G: 1 TABLET ORAL at 09:50

## 2025-06-27 RX ADMIN — BUTALBITAL, ACETAMINOPHEN AND CAFFEINE 1 TABLET: 325; 50; 40 TABLET ORAL at 10:50

## 2025-06-27 RX ADMIN — SUCRALFATE 1 G: 1 TABLET ORAL at 13:29

## 2025-06-27 RX ADMIN — PANTOPRAZOLE SODIUM 40 MG: 40 INJECTION, POWDER, FOR SOLUTION INTRAVENOUS at 09:51

## 2025-06-27 RX ADMIN — ACETAMINOPHEN 1000 MG: 500 TABLET, FILM COATED ORAL at 04:57

## 2025-06-27 RX ADMIN — CEFEPIME 2000 MG: 2 INJECTION, POWDER, FOR SOLUTION INTRAVENOUS at 05:03

## 2025-06-27 RX ADMIN — VANCOMYCIN HYDROCHLORIDE 1250 MG: 1.25 INJECTION, POWDER, LYOPHILIZED, FOR SOLUTION INTRAVENOUS at 05:47

## 2025-06-27 NOTE — CONSULTS
[facility]  Initial Inpatient Consult Note    Referring Provider: Dr Corona    Reason for Consultation: Abdominal pain    Subjective     History of present illness:    20 y.o. female asked to see for abdominal pain.  Presented to ER c/o headache, severe sinus pressure.  Associated fevers and N/V.  Endorsed some vague R side/flank pain.  Ct scan shows no acute findings.  Labs with minimal leukocytosis, normal LFTs and lipase.   Prior CCY 10/2024.  EGD done preceding CCY with mild erosive gastritis. Current THC user.  Reports improvement in sx this am.      Past Medical History:  Past Medical History:   Diagnosis Date    Allergic     Anemia     iron deficiency    Anxiety     Asthma     Biliary dyskinesia 10/19/2024    Chlamydia     Chronic chest pain 05/29/2024    Chronic cough 05/29/2024    Eczema     Intractable abdominal pain 10/19/2024    Left-sided weakness 10/04/2023    Recurrent infections 07/09/2024    Routine health maintenance 05/08/2024    Seasonal allergies     SIRS (systemic inflammatory response syndrome) 10/20/2024    Visual impairment      Past Surgical History:  Past Surgical History:   Procedure Laterality Date    CHOLECYSTECTOMY WITH INTRAOPERATIVE CHOLANGIOGRAM N/A 10/23/2024    Procedure: CHOLECYSTECTOMY LAPAROSCOPIC INTRAOPERATIVE CHOLANGIOGRAM;  Surgeon: Lala Acosta MD;  Location: [facility] MAIN OR;  Service: General;  Laterality: N/A;    COLONOSCOPY      ENDOSCOPY N/A 10/21/2024    Procedure: ESOPHAGOGASTRODUODENOSCOPY with cold biopsies;  Surgeon: Viktoriya Parmar MD;  Location: [facility] ENDOSCOPY;  Service: Gastroenterology;  Laterality: N/A;  pre: nausea, vomitting  post: erosive gastritis    WISDOM TOOTH EXTRACTION        Social History:   Social History     Tobacco Use    Smoking status: Never    Smokeless tobacco: Never   Substance Use Topics    Alcohol use: Never      Family History:  Family History   Problem Relation Age of Onset    Asthma Mother      Hyperlipidemia Mother     Asthma Father     Arthritis Maternal Grandmother     Cancer Maternal Grandfather     Cancer Paternal Grandmother     Hyperlipidemia Paternal Grandmother     Vision loss Paternal Grandmother     Hyperlipidemia Paternal Grandfather     Cancer Maternal Great-Grandmother     Breast cancer Maternal Great-Grandmother        Home Meds:  Medications Prior to Admission   Medication Sig Dispense Refill Last Dose/Taking    albuterol sulfate  (90 Base) MCG/ACT inhaler Inhale 2 puffs 4 (Four) Times a Day.   Past Month    cetirizine (zyrTEC) 10 MG tablet Take 1 tablet by mouth Daily. 90 tablet 1 Past Week Morning    Denta 5000 Plus 1.1 % cream Take 1 dose by mouth Every Night.   6/25/2025    dicyclomine (BENTYL) 10 MG capsule Take 1 capsule by mouth 4 (Four) Times a Day Before Meals & at Bedtime. 240 capsule 1 6/26/2025 Morning    ondansetron ODT (ZOFRAN-ODT) 4 MG disintegrating tablet Place 1 tablet on the tongue 4 (Four) Times a Day As Needed for Nausea. 20 tablet 0 6/26/2025 Morning    pantoprazole (PROTONIX) 40 MG EC tablet Take 1 tablet by mouth Daily. 60 tablet 0 6/25/2025 Morning    norelgestromin-ethinyl estradiol (Zafemy) 150-35 MCG/24HR Place 1 patch on the skin as directed by provider 1 (One) Time Per Week for 30 days. Leave patch off the fourth week for a period. 3 patch 11      Current Meds:   cefepime, 2,000 mg, Intravenous, Q8H  pantoprazole, 40 mg, Intravenous, Q12H  sodium chloride, 10 mL, Intravenous, Q12H  sucralfate, 1 g, Oral, 4x Daily AC & at Bedtime      Allergies:  Allergies   Allergen Reactions    Pomegranate [Punica] Swelling       Objective     Vital Signs  Temp:  [98.1 °F (36.7 °C)-102.4 °F (39.1 °C)] 98.1 °F (36.7 °C)  Heart Rate:  [] 91  Resp:  [18-20] 18  BP: ()/(55-83) 95/55  Physical Exam:  General Appearance:     Alert, cooperative, in no acute distress   Abdomen:     Normal bowel sounds, no masses, no organomegaly, soft     nontender, nondistended, no  guarding, no rebound                 tenderness   Rectal:     Deferred       Results Review:   I reviewed the patient's new clinical results.  I reviewed the patient's new imaging results and agree with the interpretation.    Results from last 7 days   Lab Units 06/27/25  0144 06/26/25  1517   WBC 10*3/mm3 12.65* 12.27*   HEMOGLOBIN g/dL 10.7* 11.5*   HEMATOCRIT % 33.4* 37.1   PLATELETS 10*3/mm3 287 302     Results from last 7 days   Lab Units 06/27/25  0144 06/26/25  1517   SODIUM mmol/L 140 136   POTASSIUM mmol/L 3.6 3.5   CHLORIDE mmol/L 106 103   CO2 mmol/L 20.8* 20.5*   BUN mg/dL 3.0* 4.0*   CREATININE mg/dL 0.75 0.81   CALCIUM mg/dL 8.7 9.3   BILIRUBIN mg/dL 0.3 0.3   ALK PHOS U/L 77 66   ALT (SGPT) U/L 17 6   AST (SGOT) U/L 28 10   GLUCOSE mg/dL 122* 92         Lab Results   Lab Value Date/Time    LIPASE 13 06/27/2025 0144    LIPASE 14 06/26/2025 1517    LIPASE 24 05/25/2025 0445    LIPASE 16 05/11/2025 0545    LIPASE 28 03/27/2025 0543    LIPASE 15 10/19/2024 0500    LIPASE 21 09/27/2024 0558    LIPASE 17 06/22/2024 0043    LIPASE 12 (L) 04/27/2024 2348    LIPASE 17 12/01/2023 0729    LIPASE 11 11/28/2023 1121    LIPASE 20 08/21/2023 1851    LIPASE 9 03/16/2023 1048    LIPASE 25 03/04/2023 0515    LIPASE 15 (L) 01/09/2023 2108    LIPASE 18 (L) 10/01/2022 0423    LIPASE 17 (L) 09/03/2022 0622       Radiology:  CT Abdomen Pelvis With Contrast   Final Result           1. No focal acute inflammatory process of bowel is identified. Mild   nonspecific pelvic free fluid. Follow-up as indications persist.       2. No obstructive uropathy.       This report was finalized on 6/26/2025 5:34 PM by Dr. Josh Samuels M.D on Workstation: [id number]              Assessment & Plan   Assessment:   Abdominal pain  THC use  Probably URI/sinus infection    Plan:   Predominate complaint is more constitutional sx, likely from undelying URI/sinus infection.  She has minimal GI complaints at time of my exam, and imaging and labs  unremarkable from GI standpoint  She can have diet as tolerated, no plans for an additional GI evaluation as things currently stand  Recommend THC cessation    I discussed the patients findings and my recommendations with patient.         Isidro Colon M.D.  [facility]  [address] [place] [address]  Office: [phone]

## 2025-06-27 NOTE — PROGRESS NOTES
MD ATTESTATION NOTE    The VINCE and I have discussed this patient's history, physical exam, and treatment plan.  I have reviewed the documentation and personally had a face to face interaction with the patient. I affirm the documentation and agree with the treatment and plan.  The attached note describes my personal findings.      I provided a substantive portion of the care of the patient.  I personally performed the physical exam in its entirety, and below are my findings.       Brief HPI: Patient with history of chronic abdominal pain.  Patient multiple ED visits recently.  Patient has fever of 102.  Does report some sinus congestion.  Pt reports she has had significant nausea and epigastric pain for the past 3 days.    PHYSICAL EXAM  ED Triage Vitals   Temp Heart Rate Resp BP SpO2   06/26/25 1506 06/26/25 1509 06/26/25 1509 06/26/25 1509 06/26/25 1509   (!) 102.4 °F (39.1 °C) 88 20 117/77 94 %      Temp src Heart Rate Source Patient Position BP Location FiO2 (%)   06/26/25 1506 06/26/25 1542 06/26/25 1542 06/26/25 1542 --   Oral Monitor Lying Left arm          GENERAL: Moderate acute distress  HENT: nares patent  EYES: no scleral icterus  CV: regular rhythm, normal rate  RESPIRATORY: normal effort  ABDOMEN: soft, generalized tenderness no rebound or guarding  MUSCULOSKELETAL: no deformity  NEURO: alert, moves all extremities, follows commands  PSYCH:  calm, cooperative  SKIN: warm, dry    Vital signs and nursing notes reviewed.        Plan: Patient will need pain medication and antiemetics.  GI consult.  CT abdomen pelvis without acute finding.  RVP negative.  White count 12.27.  Will obtain stool studies if patient has diarrhea. Tylenol as needed.

## 2025-06-27 NOTE — PROGRESS NOTES
Pt today feeling much better, no significant abdominal pains, nausea vomiting have resolved, no diarrhea at this time.  Wants to try to go home today if possible.     On exam,   General: No acute distress, nontoxic  HEENT: EOMI  Pulm: Symmetric chest rise, nonlabored breathing  CV: Regular rate and rhythm  GI: Nondistended, nontender  MSK: No deformity  Skin: Warm, dry  Neuro: Awake, alert, oriented x 4, moving all extremities, no focal deficits  Psych: Calm, cooperative    Vital signs and nursing notes reviewed.           Plan: GI has evaluated and cleared from their standpoint, plan for p.o. challenge today and hopeful for discharge.  Will discontinue antibiotics, do not have high suspicion at this point for bacterial illness.  Outpatient follow-up and supportive care, ED return for worsening symptoms as needed.         MD Attestation Note    SHARED VISIT: This visit was performed by BOTH a physician and an APC. The substantive portion of the medical decision making was performed by this attesting physician who made or approved the management plan and takes responsibility for patient management. All studies in the APC note (if performed) were independently interpreted by me.

## 2025-06-27 NOTE — OUTREACH NOTE
Prep Survey      Flowsheet Row Responses   [facility] patient discharged from? [facility]   Is LACE score < 7 ? No   Eligibility [facility]   Date of Admission 06/26/25   Discharge Disposition Home or Self Care   Discharge diagnosis Upper abdominal pain  Nausea vomiting  Fever  -   Does the patient have one of the following disease processes/diagnoses(primary or secondary)? Other   Does the patient have Home health ordered? No   Is there a DME ordered? No   Prep survey completed? Yes            ROWENA MCMILLAN - Registered Nurse

## 2025-06-27 NOTE — DISCHARGE SUMMARY
ED OBSERVATION PROGRESS/DISCHARGE SUMMARY    Date of Admission: 6/26/2025   LOS: 0 days   PCP: Tricia Ruiz APRN    Final Diagnosis Abdominal pain       Subjective     Hospital Outcome:     Dariel Schilling is a 20 y.o. female presenting with burning right-sided abdominal pain with associated nausea vomiting.  In the ED her temperature 102.4 °F.  CT A/P without acute findings.  Initial lactate was 3.1, after hydration was 1.6.  Lipase 13.  WBC is 12.65 and CMP is unremarkable.  UA showed 1+ ketones otherwise unremarkable.  Pending GI service input.    06/27/25  Abdominal pain nausea vomiting controlled overnight.  This morning she was hypotensive with a blood pressure of 88/63 and temperature 100.7 °F.  She also complains of right maxillary tenderness.  Patient was initiated on broad-spectrum antibiotics this morning but these have since been discontinued given patient clinically reporting significant improvement. She denies any dental issues other than sensitivity.  GI was consulted and saw and evaluated patient and complained of more upper respiratory complaints and denied any GI issues during his exam; patient okay to try food and if tolerating diet okay for discharge from GI standpoint.  No other inpatient testing recommended at this time.  Patient started on Mucinex Flonase and as needed saline nasal spray.  Patient has been able to eat throughout the day.  All labs and imaging findings as well as specialist recommendations discussed with patient who is agreeable for discharge home at this time.    Review of Systems:   Constitutional:  No weight changes, fever, or chills. No night sweats, no fatigue, no malaise.    Cardiovascular:  No chest pain, no palpitations, no edema.      Respiratory:  No cough, no smoke exposure, no dyspnea, no orthopnea.   Gastrointestinal: No current abdominal pain or nausea or vomiting no reflux pain, no anorexia, no dysphagia. No hematochezia or melena.    Neuro:  No weakness, no  numbness, no paresthesias, no loss of consciousness, no syncope, no dizziness. +Headache     Objective   Physical Exam:   Constitutional: Awake, alert. Well developed for age. Nontoxic appearing.   Eyes: PERRL, sclerae anicteric, no conjunctival injection.  EOMI  HENT: NCAT, mucous membranes moist, normal hearing  Neck: Supple, nontender, trachea midline  Respiratory: Clear to auscultation bilaterally, nonlabored respirations on room air  Cardiovascular: RRR, no murmurs, palpable pedal pulses bilaterally. No appreciable edema.   Gastrointestinal: Positive bowel sounds, soft, nontender, not distended.   Musculoskeletal: No bilateral ankle edema, no clubbing or cyanosis to extremities. No obvious deformities.   Psychiatric: Appropriate affect, cooperative. Converses appropriately for age.   Neurologic: Oriented x 3, strength symmetric in all extremities. Cranial nerves grossly intact to confrontation, speech clear  Skin: No rashes, skin intact.     Results Review:    I have reviewed the labs, radiology results and diagnostic studies.    Results from last 7 days   Lab Units 06/27/25  0144   WBC 10*3/mm3 12.65*   HEMOGLOBIN g/dL 10.7*   HEMATOCRIT % 33.4*   PLATELETS 10*3/mm3 287     Results from last 7 days   Lab Units 06/27/25  0144 06/26/25  1517   SODIUM mmol/L 140 136   POTASSIUM mmol/L 3.6 3.5   CHLORIDE mmol/L 106 103   CO2 mmol/L 20.8* 20.5*   BUN mg/dL 3.0* 4.0*   CREATININE mg/dL 0.75 0.81   CALCIUM mg/dL 8.7 9.3   BILIRUBIN mg/dL 0.3 0.3   ALK PHOS U/L 77 66   ALT (SGPT) U/L 17 6   AST (SGOT) U/L 28 10   GLUCOSE mg/dL 122* 92     Imaging Results (Last 24 Hours)       Procedure Component Value Units Date/Time    CT Abdomen Pelvis With Contrast [[id number]] Collected: 06/26/25 1732     Updated: 06/26/25 1737    Narrative:      CT ABDOMEN PELVIS W CONTRAST-     INDICATIONS: Right lower quadrant pain     TECHNIQUE: Radiation dose reduction techniques were utilized, including  automated exposure control and  exposure modulation based on body size.  Enhanced ABDOMEN AND PELVIS CT     COMPARISON: 9/27/2024     FINDINGS:     The gallbladder is surgically absent. There are low densities are seen  that are too small to characterize, appear similar to prior exam.     Otherwise unremarkable appearance of the liver, spleen, adrenal glands,  pancreas, kidneys, bladder. The uterus is retroflexed, retroverted.     No bowel obstruction or abnormal bowel thickening is identified. The  appendix does not appear inflamed.     No free intraperitoneal gas. Mild pelvic free fluid.     Scattered small mesenteric and para-aortic lymph nodes are seen that are  not significant by size criteria.     Abdominal aorta is not aneurysmal.           The lung bases are clear.     No acute fracture is identified.             Impression:            1. No focal acute inflammatory process of bowel is identified. Mild  nonspecific pelvic free fluid. Follow-up as indications persist.     2. No obstructive uropathy.     This report was finalized on 6/26/2025 5:34 PM by Dr. Josh Samuels M.D on Workstation: [employer]               I have reviewed the medications.     Discharge Medications        ASK your doctor about these medications        Instructions Start Date   albuterol sulfate  (90 Base) MCG/ACT inhaler  Commonly known as: PROVENTIL HFA;VENTOLIN HFA;PROAIR HFA   Inhale 2 puffs 4 (Four) Times a Day.      cetirizine 10 MG tablet  Commonly known as: zyrTEC   10 mg, Oral, Daily      Denta 5000 Plus 1.1 % cream  Generic drug: Sodium Fluoride   Take 1 dose by mouth Every Night.      dicyclomine 10 MG capsule  Commonly known as: BENTYL   10 mg, Oral, 4 Times Daily Before Meals & Nightly      norelgestromin-ethinyl estradiol 150-35 MCG/24HR  Commonly known as: Zafemy   1 patch, Transdermal, Weekly, Leave patch off the fourth week for a period.      ondansetron ODT 4 MG disintegrating tablet  Commonly known as: ZOFRAN-ODT   4 mg, Translingual, 4  Times Daily PRN      pantoprazole 40 MG EC tablet  Commonly known as: PROTONIX   40 mg, Oral, Daily              ---------------------------------------------------------------------------------------------  Assessment & Plan   Assessment/Problem List    Abdominal pain      Plan:    Upper abdominal pain  Nausea vomiting  Fever  - IV fluids  - Zofran for nausea vomiting  - Protonix and Carafate discontinued  - Tylenol for fever  - GI PCR pending since patient has not been able to provide a sample at this time  -GI was consulted and saw and evaluated patient and complained of more upper respiratory complaints and denied any GI issues during his exam; patient okay to try food and if tolerating diet okay for discharge from GI standpoint.  No other inpatient testing recommended at this time.  - Patient started on Mucinex Flonase and as needed saline nasal spray.   - Patient has been able to eat throughout the day     Asthma  - Albuterol nebs every 6 as needed    Disposition: Discharge to home    Follow-up after Discharge: PCP in 1 to 2 weeks, GI as needed    This note will serve as a discharge summary    Macy Olvera PA-C 06/27/25 07:40 EDT    I have worn appropriate PPE during this patient encounter, sanitized my hands both with entering and exiting patient's room.      36 minutes has been spent by [facility] providers in the care of this patient while under observation status

## 2025-06-27 NOTE — PLAN OF CARE
Problem: Adult Inpatient Plan of Care  Goal: Absence of Hospital-Acquired Illness or Injury  Intervention: Identify and Manage Fall Risk  Recent Flowsheet Documentation  Taken 6/27/2025 0010 by Momo Green RN  Safety Promotion/Fall Prevention:   safety round/check completed   nonskid shoes/slippers when out of bed   clutter free environment maintained   fall prevention program maintained  Taken 6/26/2025 2200 by Momo Green RN  Safety Promotion/Fall Prevention:   safety round/check completed   nonskid shoes/slippers when out of bed   fall prevention program maintained   clutter free environment maintained  Taken 6/26/2025 2006 by Momo Green RN  Safety Promotion/Fall Prevention:   nonskid shoes/slippers when out of bed   safety round/check completed   fall prevention program maintained   clutter free environment maintained   Goal Outcome Evaluation:  Plan of Care Reviewed With: patient        Progress: no change  Outcome Evaluation: Patient alert and oriented x4, RA, NPO. Patient c/o increase abdominal pain and multiple vomiting episodes meds given.  CT of ABD negative, lactate 1.6. GI consulted.

## 2025-06-30 ENCOUNTER — TRANSITIONAL CARE MANAGEMENT TELEPHONE ENCOUNTER (OUTPATIENT)
Dept: CALL CENTER | Facility: HOSPITAL | Age: 20
End: 2025-06-30
Payer: MEDICAID

## 2025-06-30 NOTE — OUTREACH NOTE
Call Center TCM Note      Flowsheet Row Responses   [facility] patient discharged from? [place]   Does the patient have one of the following disease processes/diagnoses(primary or secondary)? Other   TCM attempt successful? Yes   Call start time 0947   Call end time 0953   Discharge diagnosis Upper abdominal pain  Nausea vomiting  Fever  -   Person spoke with today (if not patient) and relationship Patient   Medication alerts for this patient Flonase, Mucinex, Sodium Chloride   Meds reviewed with patient/caregiver? Yes   Is the patient having any side effects they believe may be caused by any medication additions or changes? No   Does the patient have all medications ordered at discharge? Yes   Prescription comments No questions or concerns with medications.   Is the patient taking all medications as directed (includes completed medication regime)? Yes   Comments Assisted patient in scheduling a PCP HOSPITAL f/u appt. Appt scheduled on 7/9/25 at 9:30 AM with TRACI Frankel.   Does the patient have an appointment with their PCP within 7-14 days of discharge? No   Nursing Interventions Assisted patient with making appointment per protocol   Has home health visited the patient within 72 hours of discharge? N/A   Psychosocial issues? No   Comments Patient states she is doing much better, no further nausea, vomiting, or abdominal pain. She is working on getting her appetite back. She reports feeling lightheaded after standing a while. Advised to drink lots of water to rehydrate.   Did the patient receive a copy of their discharge instructions? Yes   Nursing interventions Reviewed instructions with patient   What is the patient's perception of their health status since discharge? Improving   Is the patient/caregiver able to teach back signs and symptoms related to disease process for when to call PCP? Yes   Is the patient/caregiver able to teach back signs and symptoms related to disease process for when to  call 911? Yes   Is the patient/caregiver able to teach back the hierarchy of who to call/visit for symptoms/problems? PCP, Specialist, Home health nurse, Urgent Care, ED, 911 Yes   If the patient is a current smoker, are they able to teach back resources for cessation? Not a smoker   TCM call completed? Yes   Wrap up additional comments Assisted patient in scheduling a [facility] f/u appt. Appt scheduled on 7/9/25 at 9:30 AM with TRACI Frankel.   Call end time 0953   Would this patient benefit from a Referral to [facility] Social Work? No   Is the patient interested in additional calls from an ambulatory ? No            Marni AVILA - Registered Nurse    6/30/2025, 09:56 EDT

## 2025-07-01 LAB — BACTERIA SPEC AEROBE CULT: NORMAL

## 2025-07-02 LAB — BACTERIA SPEC AEROBE CULT: NORMAL

## 2025-07-02 NOTE — CASE MANAGEMENT/SOCIAL WORK
Case Management Discharge Note      Final Note: Home via private vehicle         Selected Continued Care - Discharged on 6/27/2025 Admission date: 6/26/2025 - Discharge disposition: Home or Self Care      Destination    No services have been selected for the patient.                Durable Medical Equipment    No services have been selected for the patient.                Dialysis/Infusion    No services have been selected for the patient.                Home Medical Care    No services have been selected for the patient.                Therapy    No services have been selected for the patient.                Community Resources    No services have been selected for the patient.                Community & DME    No services have been selected for the patient.                    Transportation Services  Transportation: Private Transportation  Private: Car    Final Discharge Disposition Code: 01 - home or self-care

## 2025-07-07 ENCOUNTER — OFFICE VISIT (OUTPATIENT)
Dept: FAMILY MEDICINE CLINIC | Facility: CLINIC | Age: 20
End: 2025-07-07
Payer: MEDICAID

## 2025-07-07 VITALS
WEIGHT: 131.4 LBS | HEIGHT: 65 IN | HEART RATE: 82 BPM | SYSTOLIC BLOOD PRESSURE: 108 MMHG | BODY MASS INDEX: 21.89 KG/M2 | DIASTOLIC BLOOD PRESSURE: 58 MMHG

## 2025-07-07 DIAGNOSIS — J01.90 ACUTE BACTERIAL SINUSITIS: Primary | ICD-10-CM

## 2025-07-07 DIAGNOSIS — J45.40 MODERATE PERSISTENT ASTHMA WITHOUT COMPLICATION: ICD-10-CM

## 2025-07-07 DIAGNOSIS — B96.89 ACUTE BACTERIAL SINUSITIS: Primary | ICD-10-CM

## 2025-07-07 RX ORDER — ALBUTEROL SULFATE 90 UG/1
2 INHALANT RESPIRATORY (INHALATION) EVERY 4 HOURS PRN
Qty: 18 G | Refills: 11 | Status: SHIPPED | OUTPATIENT
Start: 2025-07-07

## 2025-07-07 NOTE — PROGRESS NOTES
Subjective   Dariel Schilling is a 20 y.o. female. Presents today for   Chief Complaint   Patient presents with    Hospital Follow Up Visit     Vomiting and nausea with body aches    Nasal Congestion     X 1.5 weeks       History Of Present Illness She has had symptoms x 10 days today.  She is a hospital discharge follow up for seasonal allergies, now turned into acute bacterial sinusitis.  Her face is swollen in the maxillary area, with pain to palp.    History of Present Illness  The patient is a 20-year-old female here for a hospital discharge follow-up after having sinusitis.    She reports an improvement in her condition since her hospital stay, although she continues to experience some symptoms. She describes persistent pressure in her sinuses, accompanied by pain and headaches. She also notes intermittent swelling and puffiness, along with dental discomfort. This is the second occurrence of these symptoms, with the previous episode lasting over a week and a half. She recalls a severe headache during that time, which led to a hospital visit. She has been using a nasal rinse twice daily and a nasal spray, which have helped manage her symptoms. Initially, the nasal rinse produced light brown discharge, but it later turned green and caused discomfort. Despite taking Mucinex, she has not noticed significant improvement. She has been making efforts to stay hydrated but feels full after drinking only a small amount of water. She attempted to use Liquid IV but found it too thick. She has been carrying a large water bottle with her and sipping from it gradually. She also reports facial stiffness and difficulty moving one side of her face, which she attributes to the swelling and pain. She has been massaging her face to alleviate these symptoms.    She mentions that her inhaler has  and requests a refill. She experienced difficulty breathing during her hospital stay, which required oxygen therapy. She recently  underwent a breathing treatment due to the unavailability of her inhaler, which she found beneficial.    MEDICATIONS  Current: Mucinex    Patient Active Problem List   Diagnosis    Chronic abdominal pain    Seasonal allergies    Nausea and vomiting    Endometriosis    Marijuana use    Mild intermittent asthma without complication    Gastroesophageal reflux disease    History of cholecystectomy    History of anemia    Abdominal pain       Social History     Socioeconomic History    Marital status: Single   Tobacco Use    Smoking status: Never    Smokeless tobacco: Never   Vaping Use    Vaping status: Never Used   Substance and Sexual Activity    Alcohol use: Never    Drug use: Yes     Types: Marijuana     Comment: every other day    Sexual activity: Not Currently     Partners: Female     Birth control/protection: Condom, Patch       Allergies   Allergen Reactions    Pomegranate [[place]] Swelling       Current Outpatient Medications on File Prior to Visit   Medication Sig Dispense Refill    cetirizine (zyrTEC) 10 MG tablet Take 1 tablet by mouth Daily. 90 tablet 1    Denta 5000 Plus 1.1 % cream Take 1 dose by mouth Every Night.      dicyclomine (BENTYL) 10 MG capsule Take 1 capsule by mouth 4 (Four) Times a Day Before Meals & at Bedtime. 240 capsule 1    fluticasone (FLONASE) 50 MCG/ACT nasal spray 2 sprays by Each Nare route Daily. 16 g 0    guaiFENesin (MUCINEX) 600 MG 12 hr tablet Take 2 tablets by mouth Every 12 (Twelve) Hours for 10 days. 40 tablet 0    norelgestromin-ethinyl estradiol (Zafemy) 150-35 MCG/24HR Place 1 patch on the skin as directed by provider 1 (One) Time Per Week for 30 days. Leave patch off the fourth week for a period. 3 patch 11    ondansetron ODT (ZOFRAN-ODT) 4 MG disintegrating tablet Place 1 tablet on the tongue 4 (Four) Times a Day As Needed for Nausea. 20 tablet 0    pantoprazole (PROTONIX) 40 MG EC tablet Take 1 tablet by mouth Daily. 60 tablet 0    sodium chloride 0.65 % nasal spray  "Administer 2 sprays into the nostril(s) as directed by provider Every 30 (Thirty) Minutes As Needed for Congestion. 480 mL 0    [DISCONTINUED] albuterol sulfate  (90 Base) MCG/ACT inhaler Inhale 2 puffs 4 (Four) Times a Day.       No current facility-administered medications on file prior to visit.       Objective   Vitals:    07/07/25 0936   BP: 108/58   Pulse: 82   Weight: 59.6 kg (131 lb 6.4 oz)   Height: 165.1 cm (65\")     Body mass index is 21.87 kg/m².    Physical Exam    Physical Exam  Constitutional:       Appearance: Normal appearance.   HENT:      Head: Normocephalic and atraumatic.   Cardiovascular:      Rate and Rhythm: Normal rate and regular rhythm.      Pulses: Normal pulses.      Heart sounds: Normal heart sounds.   Pulmonary:      Effort: Pulmonary effort is normal.      Breath sounds: Normal breath sounds.   Skin:     General: Skin is warm and dry.      Capillary Refill: Capillary refill takes less than 2 seconds.   Neurological:      Mental Status: She is alert.         Results         Procedures     Assessment & Plan   Diagnoses and all orders for this visit:    1. Acute bacterial sinusitis (Primary)  -     amoxicillin-clavulanate (AUGMENTIN) 875-125 MG per tablet; Take 1 tablet by mouth 2 (Two) Times a Day for 10 days.  Dispense: 20 tablet; Refill: 0    2. Moderate persistent asthma without complication  -     albuterol sulfate  (90 Base) MCG/ACT inhaler; Inhale 2 puffs Every 4 (Four) Hours As Needed for Wheezing or Shortness of Air.  Dispense: 18 g; Refill: 11         Assessment & Plan  1. Sinusitis.  She reports persistent symptoms including sinus pressure, pain, headaches, facial swelling, and teeth ache. She has been using nasal sprays and allergy medication but continues to experience symptoms. She is advised to continue nasal rinses and use warm compresses to help alleviate symptoms. Increasing water intake is recommended to enhance the effectiveness of Mucinex. Augmentin 875 " mg twice a day for 10 days has [facility] prescribed [facility]    2. Asthma.  She reports that her inhaler is  and has experienced difficulty breathing, especially during episodes of stomach pain. A new prescription for her inhaler has been sent to her pharmacy.           BMI is within normal parameters. No other follow-up for BMI required.     Return if symptoms worsen or fail to improve.     Discussed Care Gaps, ordered referrals and encouraged vaccination updates.       - Pt agrees with plan of care and denies further questions/concerns today  - This document is intended for medical expert use only. Persons  reading this document without medical staff guidance may result in misinterpretation and unintended morbidity     Go to the ER for any possible life-threatening symptoms such as chest pain or shortness of air.      Please allow 3-5 business days for recommendations based on new results      I personally spent time with this patient, preparing for the visit, reviewing tests, obtaining and/or reviewing a separately obtained history, performing a medically appropriate examination and/or evaluation, counseling and educating the patient/family/caregiver, ordering medications,  documenting information in the medical record and indepentently interpreting results.         Patient or patient representative verbalized consent for the use of Ambient Listening during the visit with  TRACI Pan for chart documentation. 2025  09:54 EDT

## 2025-07-08 ENCOUNTER — READMISSION MANAGEMENT (OUTPATIENT)
Dept: CALL CENTER | Facility: HOSPITAL | Age: 20
End: 2025-07-08
Payer: MEDICAID

## 2025-07-08 NOTE — OUTREACH NOTE
Medical Week 2 Survey      Flowsheet Row Responses   [facility] patient discharged from? [place]   Does the patient have one of the following disease processes/diagnoses(primary or secondary)? Other   Week 2 attempt successful? No   Unsuccessful attempts Attempt 1            ROWENA MCMILLAN - Registered Nurse

## 2025-07-14 ENCOUNTER — READMISSION MANAGEMENT (OUTPATIENT)
Dept: CALL CENTER | Facility: HOSPITAL | Age: 20
End: 2025-07-14
Payer: MEDICAID

## 2025-07-14 NOTE — OUTREACH NOTE
Medical Week 2 Survey      Flowsheet Row Responses   [facility] patient discharged from? [place]   Does the patient have one of the following disease processes/diagnoses(primary or secondary)? Other   Week 2 attempt successful? No   Unsuccessful attempts Attempt 2   Revoke Michaela OSBORNE - Registered Nurse   Never smoker

## 2025-08-04 ENCOUNTER — OFFICE VISIT (OUTPATIENT)
Dept: OBSTETRICS AND GYNECOLOGY | Facility: CLINIC | Age: 20
End: 2025-08-04
Payer: MEDICAID

## 2025-08-04 VITALS
BODY MASS INDEX: 22.49 KG/M2 | DIASTOLIC BLOOD PRESSURE: 70 MMHG | HEIGHT: 65 IN | WEIGHT: 135 LBS | SYSTOLIC BLOOD PRESSURE: 110 MMHG

## 2025-08-04 DIAGNOSIS — Z01.419 ENCOUNTER FOR GYNECOLOGICAL EXAMINATION WITHOUT ABNORMAL FINDING: Primary | ICD-10-CM

## 2025-08-04 DIAGNOSIS — Z30.45 ENCOUNTER FOR SURVEILLANCE OF TRANSDERMAL PATCH HORMONAL CONTRACEPTIVE DEVICE: ICD-10-CM

## 2025-08-04 RX ORDER — NORELGESTROMIN AND ETHINYL ESTRADIOL 35; 150 UG/MG; UG/MG
1 PATCH TRANSDERMAL WEEKLY
Qty: 3 PATCH | Refills: 12 | Status: SHIPPED | OUTPATIENT
Start: 2025-08-04 | End: 2026-08-04

## 2025-08-06 LAB
C TRACH RRNA CVX QL NAA+PROBE: NEGATIVE
CONV .: NORMAL
CYTOLOGIST CVX/VAG CYTO: NORMAL
CYTOLOGY CVX/VAG DOC CYTO: NORMAL
CYTOLOGY CVX/VAG DOC THIN PREP: NORMAL
DX ICD CODE: NORMAL
N GONORRHOEA RRNA CVX QL NAA+PROBE: NEGATIVE
OTHER STN SPEC: NORMAL
SERVICE CMNT-IMP: NORMAL
STAT OF ADQ CVX/VAG CYTO-IMP: NORMAL
T VAGINALIS RRNA SPEC QL NAA+PROBE: NEGATIVE

## (undated) DEVICE — PK LAP CHOLE BG

## (undated) DEVICE — ADAPT CLN BIOGUARD AIR/H2O DISP

## (undated) DEVICE — SUT VIC 0 UR6 27IN VCP603H

## (undated) DEVICE — LN SMPL CO2 SHTRM SD STREAM W/M LUER

## (undated) DEVICE — APPL CHLORAPREP HI/LITE 26ML ORNG

## (undated) DEVICE — ENDOPATH XCEL UNIVERSAL TROCAR STABLILITY SLEEVES: Brand: ENDOPATH XCEL

## (undated) DEVICE — UNDRGLV SURG BIOGEL PUNCTUREINDICATION SZ7 PF STRL

## (undated) DEVICE — ADHS SKIN SURG TISS VISC PREMIERPRO EXOFIN HI/VISC FAST/DRY

## (undated) DEVICE — FRCP BX RADJAW4 NDL 2.8 240CM LG OG BX40

## (undated) DEVICE — KT ORCA ORCAPOD DISP STRL

## (undated) DEVICE — ENDOPOUCH RETRIEVER SPECIMEN RETRIEVAL BAGS: Brand: ENDOPOUCH RETRIEVER

## (undated) DEVICE — LAPAROVUE VISIBILITY SYSTEM LAPAROSCOPIC SOLUTIONS: Brand: LAPAROVUE

## (undated) DEVICE — ENDOPATH XCEL BLADELESS TROCARS WITH STABILITY SLEEVES: Brand: ENDOPATH XCEL

## (undated) DEVICE — STPCK 3WY D201 DISCOFIX

## (undated) DEVICE — SYR LL TP 10ML STRL

## (undated) DEVICE — SYR LUERLOK 20CC BX/50

## (undated) DEVICE — DISPOSABLE MONOPOLAR ENDOSCOPIC CORD 10 FT. (3M): Brand: KIRWAN

## (undated) DEVICE — TUBING, SUCTION, 1/4" X 10', STRAIGHT: Brand: MEDLINE

## (undated) DEVICE — BLCK/BITE BLOX W/DENTL/RIM W/STRAP 54F

## (undated) DEVICE — SENSR O2 OXIMAX FNGR A/ 18IN NONSTR

## (undated) DEVICE — SOL NACL 0.9PCT 1000ML

## (undated) DEVICE — MSK ENDO PORT O2 POM ELITE CURAPLEX A/

## (undated) DEVICE — LAPAROSCOPIC SMOKE FILTRATION SYSTEM: Brand: PALL LAPAROSHIELD® PLUS LAPAROSCOPIC SMOKE FILTRATION SYSTEM

## (undated) DEVICE — GLV SURG BIOGEL M LTX PF 6 1/2

## (undated) DEVICE — 3M™ STERI-DRAPE™ INSTRUMENT POUCH 1018L: Brand: STERI-DRAPE™

## (undated) DEVICE — SUT MNCRYL PLS ANTIB UD 4/0 PS2 18IN

## (undated) DEVICE — COVER,C-ARM,41X74: Brand: MEDLINE